# Patient Record
Sex: FEMALE | Race: WHITE | NOT HISPANIC OR LATINO | Employment: OTHER | URBAN - METROPOLITAN AREA
[De-identification: names, ages, dates, MRNs, and addresses within clinical notes are randomized per-mention and may not be internally consistent; named-entity substitution may affect disease eponyms.]

---

## 2021-07-15 ENCOUNTER — TELEPHONE (OUTPATIENT)
Dept: NEUROSURGERY | Facility: CLINIC | Age: 81
End: 2021-07-15

## 2021-07-15 NOTE — TELEPHONE ENCOUNTER
PT DAUGHTER CALLED TO CHECK STATUS OF REFERRAL, STATED THE REFERRING DR IS Sue Esparza FROM [facility], [phone] THEY ARE ONLY OPEN ON MON, WED AND Friday 8AM-5PM     UNABLE TO START REFERRAL OR LEAVE A MESSAGE DUE TO THEM BEING CLOSED TODAY.     PT DAUGHTER STATED PT HAD A CT SCAN AT [facility], CALLED [phone] [facility] TO HAVE CT FAXED TO THE HUB     THANK YOU

## 2021-10-11 ENCOUNTER — OFFICE VISIT (OUTPATIENT)
Dept: NEUROLOGY | Facility: CLINIC | Age: 81
End: 2021-10-11

## 2021-10-11 VITALS
RESPIRATION RATE: 16 BRPM | HEIGHT: 65 IN | SYSTOLIC BLOOD PRESSURE: 118 MMHG | HEART RATE: 87 BPM | OXYGEN SATURATION: 96 % | BODY MASS INDEX: 24.96 KG/M2 | DIASTOLIC BLOOD PRESSURE: 70 MMHG

## 2021-10-11 DIAGNOSIS — G91.2 NORMAL PRESSURE HYDROCEPHALUS (HCC): Primary | ICD-10-CM

## 2021-10-11 DIAGNOSIS — R26.89 ABNORMALITY OF GAIT DUE TO IMPAIRMENT OF BALANCE: ICD-10-CM

## 2021-10-11 DIAGNOSIS — R41.89 COGNITIVE IMPAIRMENT: ICD-10-CM

## 2021-10-11 DIAGNOSIS — R32 URINARY INCONTINENCE, UNSPECIFIED TYPE: ICD-10-CM

## 2021-10-11 PROCEDURE — 99204 OFFICE O/P NEW MOD 45 MIN: CPT | Performed by: PSYCHIATRY & NEUROLOGY

## 2021-10-11 RX ORDER — MEMANTINE HYDROCHLORIDE 10 MG/1
10 TABLET ORAL NIGHTLY
COMMUNITY
Start: 2021-10-08

## 2021-10-11 RX ORDER — SACCHAROMYCES BOULARDII 250 MG
CAPSULE ORAL
COMMUNITY
Start: 2021-09-29

## 2021-10-11 RX ORDER — AMOXICILLIN 500 MG/1
CAPSULE ORAL
COMMUNITY
Start: 2021-09-29 | End: 2022-12-20 | Stop reason: HOSPADM

## 2021-10-11 RX ORDER — LEVOTHYROXINE SODIUM 0.05 MG/1
50 TABLET ORAL DAILY
COMMUNITY
Start: 2021-09-13

## 2021-10-11 RX ORDER — MIRABEGRON 50 MG/1
50 TABLET, FILM COATED, EXTENDED RELEASE ORAL DAILY
COMMUNITY
Start: 2021-09-13 | End: 2022-12-20 | Stop reason: HOSPADM

## 2021-10-11 NOTE — H&P (VIEW-ONLY)
Chief Complaint   Patient presents with   • NORMAL PRESSURE HYDROCEPHALUS   • DILATED VENTRICLE       Patient ID: Velma Bangura is a 80 y.o. female.    HPI: I had the pleasure of seeing your patient today. As you may know she is an 80-year-old female with a history of short-term memory loss, urinary incontinence and gait disturbance. Her son who accompanies her today says that she has had increased cognitive issues over the past 4 years or so. She has had to make lists and has issues with repeating herself. The patient did repeat herself on a couple of occasions during our interview today. She has also had incontinence for the past 2 years. Her son says that she has complete incontinence. The gait and balance issues have been going on for the past couple of years as well. She has progressed to using a walker during long distance ambulation and wheelchair is as needed. Her last fall was potentially a week or so ago. She recently had a CT scan of her head Which showed significant volume loss involving the temporal lobes with enlargement of the parahippocampal fissures and the sylvian fissures. Her mother had a history of dementia.   The patient has a history of breast cancer however was treated with radiation and no chemotherapy. She denies any history of diabetes. No family history of peripheral neuropathies.    The following portions of the patient's history were reviewed and updated as appropriate: allergies, current medications, past family history, past medical history, past social history, past surgical history and problem list.    Review of Systems   Constitutional: Negative for activity change, appetite change and fatigue.   HENT: Negative for ear pain and trouble swallowing.    Eyes: Negative for photophobia, pain and visual disturbance.   Respiratory: Negative for cough, chest tightness and shortness of breath.    Gastrointestinal: Negative for nausea and vomiting.   Musculoskeletal: Positive for back pain and  gait problem. Negative for neck pain.   Allergic/Immunologic: Negative for environmental allergies, food allergies and immunocompromised state.   Neurological: Negative for dizziness, tremors, seizures, syncope, facial asymmetry, speech difficulty, weakness, light-headedness, numbness and headaches.   Psychiatric/Behavioral: Positive for confusion. Negative for agitation, behavioral problems, decreased concentration, dysphoric mood, hallucinations, self-injury, sleep disturbance and suicidal ideas. The patient is not nervous/anxious and is not hyperactive.       I have reviewed the review of systems above performed by my medical assistant.      Vitals:    10/11/21 1023   BP: 118/70   Pulse: 87   Resp: 16   SpO2: 96%       Neurologic Exam     Mental Status   Oriented to person, place, and time.   Registration: recalls 3 of 3 objects. Follows 3 step commands.   Attention: normal. Concentration: normal.   Speech: speech is normal   Level of consciousness: alert  Knowledge: consistent with education (No deficits found.).   Normal comprehension.     Cranial Nerves     CN II   Visual fields full to confrontation.     CN III, IV, VI   Pupils are equal, round, and reactive to light.  Extraocular motions are normal.   CN III: no CN III palsy  CN VI: no CN VI palsy  Nystagmus: none   Diplopia: none    CN V   Facial sensation intact.     CN VII   Facial expression full, symmetric.     CN VIII   CN VIII normal.     CN IX, X   CN IX normal.   CN X normal.     CN XI   CN XI normal.     CN XII   CN XII normal.     Motor Exam   Muscle bulk: normal  Right arm tone: normal  Left arm tone: normal  Right leg tone: normal  Left leg tone: normal    Strength   Right neck flexion: 5/5  Left neck flexion: 5/5  Right neck extension: 5/5  Left neck extension: 5/5  Right deltoid: 5/5  Left deltoid: 5/5  Right biceps: 5/5  Left biceps: 5/5  Right triceps: 5/5  Left triceps: 5/5  Right wrist flexion: 5/5  Left wrist flexion: 5/5  Right wrist  extension: 5/5  Left wrist extension: 5/5  Right interossei: 5/5  Left interossei: 5/5  Right abdominals: 5/5  Left abdominals: 5/5  Right iliopsoas: 5/5  Left iliopsoas: 5/5  Right quadriceps: 5/5  Left quadriceps: 5/5  Right hamstrin/5  Left hamstrin/5  Right glutei: 5/5  Left glutei: 5/5  Right anterior tibial: 5/5  Left anterior tibial: 5/5  Right posterior tibial: 5/5  Left posterior tibial: 5/5  Right peroneal: 5/5  Left peroneal: 5/5  Right gastroc: 5/5  Left gastroc: 55    Sensory Exam   Right leg light touch: decreased from toes  Left leg light touch: decreased from toes  Right leg vibration: decreased from ankle  Left leg vibration: decreased from ankle  Right leg proprioception: decreased from toes  Left leg proprioception: decreased from toes  Right leg pinprick: decreased from toes  Left leg pinprick: decreased from toes    Gait, Coordination, and Reflexes     Gait  Gait: wide-based    Coordination   Romberg: positive    Tremor   Resting tremor: absent  Intention tremor: absent    Reflexes   Right brachioradialis: 2+  Left brachioradialis: 2+  Right biceps: 2+  Left biceps: 2+  Right triceps: 2+  Left triceps: 2+  Right patellar: 1+  Left patellar: 1+  Right achilles: 1+  Left achilles: 0  Right : 2+  Left : 2+Station is normal.       Physical Exam  Vitals reviewed.   Constitutional:       General: She is not in acute distress.     Appearance: She is well-developed.   HENT:      Head: Normocephalic and atraumatic.   Eyes:      Extraocular Movements: EOM normal.      Pupils: Pupils are equal, round, and reactive to light.   Cardiovascular:      Rate and Rhythm: Normal rate and regular rhythm.      Heart sounds: Normal heart sounds.   Pulmonary:      Effort: Pulmonary effort is normal. No respiratory distress.      Breath sounds: Normal breath sounds.   Abdominal:      General: Bowel sounds are normal. There is no distension.      Palpations: Abdomen is soft.      Tenderness: There is no  abdominal tenderness.   Musculoskeletal:         General: No deformity.      Cervical back: Normal range of motion.   Skin:     General: Skin is warm.      Findings: No rash.   Neurological:      Mental Status: She is oriented to person, place, and time.      Coordination: Romberg Test abnormal.      Deep Tendon Reflexes:      Reflex Scores:       Tricep reflexes are 2+ on the right side and 2+ on the left side.       Bicep reflexes are 2+ on the right side and 2+ on the left side.       Brachioradialis reflexes are 2+ on the right side and 2+ on the left side.       Patellar reflexes are 1+ on the right side and 1+ on the left side.       Achilles reflexes are 1+ on the right side and 0 on the left side.  Psychiatric:         Speech: Speech normal.         Judgment: Judgment normal.         Procedures    Assessment/Plan: I had a lengthy discussion with her and her son about normal pressure hydrocephalus. We did discuss the potential benefit of moving forward with testing. We will go ahead and schedule a lumbar puncture. This is to be a high-volume spinal tap. If this does significantly improve her gait going well. However for now we will see her back after the lumbar puncture has been performed. May consider EMG/nerve conduction study as well.       Diagnoses and all orders for this visit:    1. Normal pressure hydrocephalus (HCC) (Primary)  -     IR Lumbar Puncture Diagnosis; Future    2. Abnormality of gait due to impairment of balance    3. Urinary incontinence, unspecified type    4. Cognitive impairment           Avelino Duenas II, MD

## 2021-10-11 NOTE — PROGRESS NOTES
10/20/21 0001   Pre-Procedure Phone Call   Procedure Time Verified Yes   Arrival Time 1300   Procedure Location Verified Yes   Medical History Reviewed No   NPO Status Reinforced Yes   Ride and Caregiver Arranged Yes   Patient Knows to Bring Current Medications No   Bring Outside Films Requested No

## 2021-10-18 ENCOUNTER — TELEPHONE (OUTPATIENT)
Dept: NEUROLOGY | Facility: CLINIC | Age: 81
End: 2021-10-18

## 2021-10-18 NOTE — TELEPHONE ENCOUNTER
CALLED DAUGHTER IN LAW. LVM. I EXPLAINED THERE ARE NO INSTRUCTIONS LIKE THAT ONES ASKED THAT I KNOW OF. HOWEVER I GAVE HER SCHEDULING PHONE NUMBER TO CALL AND ASK IF SHE THING IS NECESSARY OR NEEDED.

## 2021-10-18 NOTE — TELEPHONE ENCOUNTER
Caller: ANJELICA STRATTON    Relationship: DAUGHTER-IN-LAW    Best call back number: [phone]    What was the call regarding: PT'S DIL CALLED WITH GENERAL QUESTIONS REGARDING PT'S LP/ST SCHEDULED FOR Wednesday, 10/20/21. PT'S DIL WOULD LIKE TO KNOW IF THERE ARE ANY PREPARATORY STEPS TO TAKE PRIOR TO THE LP/ST. DOES PT NEED TO FAST? ANY SPECIFIC BATHING INSTRUCTIONS? PREFERRED CLOTHING TO WEAR TO THE PROCEDURE?    Do you require a callback: YES, PLEASE.    PLEASE REVIEW AND ADVISE.

## 2021-10-20 ENCOUNTER — TELEPHONE (OUTPATIENT)
Dept: NEUROLOGY | Facility: CLINIC | Age: 81
End: 2021-10-20

## 2021-10-20 ENCOUNTER — HOSPITAL ENCOUNTER (OUTPATIENT)
Dept: GENERAL RADIOLOGY | Facility: HOSPITAL | Age: 81
Discharge: HOME | End: 2021-10-20
Admitting: PSYCHIATRY & NEUROLOGY

## 2021-10-20 VITALS
DIASTOLIC BLOOD PRESSURE: 87 MMHG | SYSTOLIC BLOOD PRESSURE: 132 MMHG | HEIGHT: 65 IN | BODY MASS INDEX: 34.99 KG/M2 | HEART RATE: 89 BPM | WEIGHT: 210 LBS | OXYGEN SATURATION: 97 % | RESPIRATION RATE: 18 BRPM

## 2021-10-20 DIAGNOSIS — G91.2 NORMAL PRESSURE HYDROCEPHALUS (HCC): ICD-10-CM

## 2021-10-20 PROCEDURE — 25010000003 LIDOCAINE 1 % SOLUTION: Performed by: PSYCHIATRY & NEUROLOGY

## 2021-10-20 RX ORDER — LIDOCAINE HYDROCHLORIDE 10 MG/ML
10 INJECTION, SOLUTION INFILTRATION; PERINEURAL ONCE
Status: COMPLETED | OUTPATIENT
Start: 2021-10-20 | End: 2021-10-20

## 2021-10-20 RX ADMIN — LIDOCAINE HYDROCHLORIDE 3 ML: 10 INJECTION, SOLUTION INFILTRATION; PERINEURAL at 13:26

## 2021-10-20 NOTE — TELEPHONE ENCOUNTER
Caller: KAUSHAL DEJESUS       Best call back number: [phone]      What was the call regarding: [facility] NEED'S  TO KNOW IF THEY SHOULD BE WATCHING FOR ANYTHING AFTER S. PUNCT ?  SAID THERE WAS NO INSTRUCTION AND DIDN'T KNOW IF NEEDED A F.U APPT.  CALLED OFFICE DR GUSTAFSON OUT  OFFICE WAS ABLE TO GET A F.U SCHED FOR PT. IF THEY NEED FURTHER ADVISE PLEASE CALL .     Do you require a callback: ONLY IF NEEDED     PLEASE ADVISE

## 2021-10-20 NOTE — DISCHARGE INSTRUCTIONS
EDUCATION /DISCHARGE INSTRUCTION   A lumbar puncture involves insertion of a sterile needle into the spinal canal by the physician   This procedure is performed for several reasons: to detect increased intracranial pressure, the presence of blood in cerebrospinal fluid (CFS), to obtain CSF specimens for laboratory studies, to administer drugs and to relieve pressure by removing CSF.    You will lie on your stomach with a pillow under your stomach.  This opens the vertebral space to allow access to the spinal needle.  The physician will sterilize the area using antiseptic solution and numb the area where the spinal needle will be placed.  If CSF is being removed for specimen, you may be asked to push or beardown to assist with the flow of the CSF. When the procedure is complete, a band aid will be placed over the injection site and you will be taken to the recovery area.    POTENTIAL RISKS OF A LUMBAR PUNCTURE INCLUDE BUT ARE NOT LIMITED TO:  *  Headache    *  Swelling at puncture site  *  Bleeding into the spinal canal *  Temporary difficulty urinating  *  Leakage of CSF   *  Fever  *  Pain caused by nerve irritation    BENEFIT OF PROCEDURE:  This is the only way to obtain spinal fluid or relieve pressure due to increased CSF.  There is no alternative.  THIS EDUCATION INFORMATION WAS REVIEWED PRIOR TO PROCEDURE AND CONSENT. Patient initials__________________Time_________________    FOLLOWING A LUMBAR PUNCTURE:  *  Drink plenty of liquids -  Caffeine is recommended   *  Lie down flat for the next 8 hours.  If you get a headache, lie down flat for 24 hours, continue to force fluids and call your doctor if  the headache persists.  *  Go straight home.  DO NOT DRIVE    CALL YOUR DOCTOR IF YOU HAVE:  *  A severe headache that will not go away  *  Redness, swelling or drainage from the puncture site  *  Neck stiffness, numbness or weakness  *  Any new or severe symptoms    Following the procedure, you should continue to  take all of your medications as directed by your primary physician unless otherwise instructed.  There have been no changes to the medications you provided us (with the following exceptions if applicable):    Resume taking your blood thinner or Aspirin on_____________________    YOU ARE THE MOST IMPORTANT FACTOR IN YOUR RECOVERY.  IF YOU HAVE QUESTIONS OR CONCERNS PLEASE CALL THE RADIOLOGY NURSES AT [phone]

## 2021-10-20 NOTE — TELEPHONE ENCOUNTER
Spoke with patient's son and informed him that she needs to lay flat and make sure she is well hydrated- he states that they [facility] him the [facility] thing at the hosp[facility].

## 2021-10-20 NOTE — NURSING NOTE
Pt arrived to Radiology triage [place] [facility] for LP.   Pt wearing a mask as well as this RN for any bedside care.

## 2021-10-20 NOTE — NURSING NOTE
Patient taken out to patient discharge via wheelchair, son to drive her home. No issues or concerns.

## 2021-10-21 ENCOUNTER — TELEPHONE (OUTPATIENT)
Dept: INTERVENTIONAL RADIOLOGY/VASCULAR | Facility: HOSPITAL | Age: 81
End: 2021-10-21

## 2021-10-26 ENCOUNTER — OFFICE VISIT (OUTPATIENT)
Dept: NEUROLOGY | Facility: CLINIC | Age: 81
End: 2021-10-26

## 2021-10-26 VITALS
SYSTOLIC BLOOD PRESSURE: 126 MMHG | BODY MASS INDEX: 34.95 KG/M2 | HEART RATE: 95 BPM | DIASTOLIC BLOOD PRESSURE: 74 MMHG | OXYGEN SATURATION: 98 % | HEIGHT: 65 IN

## 2021-10-26 DIAGNOSIS — R41.89 COGNITIVE IMPAIRMENT: ICD-10-CM

## 2021-10-26 DIAGNOSIS — R26.89 SHUFFLING GAIT: ICD-10-CM

## 2021-10-26 DIAGNOSIS — G25.2 RESTING TREMOR: ICD-10-CM

## 2021-10-26 DIAGNOSIS — G60.9 PERIPHERAL NEUROPATHY, IDIOPATHIC: ICD-10-CM

## 2021-10-26 DIAGNOSIS — R26.89 ABNORMALITY OF GAIT DUE TO IMPAIRMENT OF BALANCE: Primary | ICD-10-CM

## 2021-10-26 PROCEDURE — 99215 OFFICE O/P EST HI 40 MIN: CPT | Performed by: PSYCHIATRY & NEUROLOGY

## 2021-10-26 NOTE — PROGRESS NOTES
"Chief Complaint   Patient presents with   • Normal Pressure Hydrocephalus       Patient ID: Velma Bangura is a 80 y.o. female.    HPI: I have had the pleasure of seeing your patient today.  As you may know she is an 80-year-old female whom we have seen previously for a history of possible normal pressure hydrocephalus.  We did send her for a high-volume spinal tap.  This did not help with respect to her gait, mentation or urinary incontinence.  She has been doing the same since last follow-up.  She still has very low confidence with respect to her gait.  She has not fallen recently.  She does use a wheelchair for long distances however a walker otherwise.  She denies any new onset focal weakness or numbness.  She still does have memory decline.  She is forgetful.  She has trouble with confusion occasionally.  Initiating tasks is somewhat difficult for her also.  Her daughter says that when she walks she tends to shuffle a lot.  They have also noted a resting tremor in her left upper extremity.  Patient says that its not a \"bother\" for her.  She denies any excessive pooling of saliva or drooling.    The following portions of the patient's history were reviewed and updated as appropriate: allergies, current medications, past family history, past medical history, past social history, past surgical history and problem list.    Review of Systems   Constitutional: Negative for activity change, appetite change and fatigue.   HENT: Negative for tinnitus, trouble swallowing and voice change.    Eyes: Negative for photophobia, pain and visual disturbance.   Respiratory: Negative for chest tightness, shortness of breath and wheezing.    Cardiovascular: Negative for chest pain, palpitations and leg swelling.   Gastrointestinal: Negative for abdominal pain, nausea and vomiting.   Endocrine: Negative for cold intolerance, heat intolerance and polydipsia.   Skin: Negative for color change, rash and wound.   Allergic/Immunologic: " Negative for environmental allergies, food allergies and immunocompromised state.   Neurological: Negative for dizziness, tremors, seizures, syncope, facial asymmetry, speech difficulty, weakness, light-headedness, numbness and headaches.   Hematological: Negative for adenopathy. Does not bruise/bleed easily.   Psychiatric/Behavioral: Negative for agitation, behavioral problems, confusion, decreased concentration, dysphoric mood, hallucinations, self-injury, sleep disturbance and suicidal ideas. The patient is not nervous/anxious and is not hyperactive.       I have reviewed the review of systems above performed by my medical assistant.      Vitals:    10/26/21 1409   BP: 126/74   Pulse: 95   SpO2: 98%       Neurologic Exam     Mental Status   Oriented to person, place, and time.   Concentration: normal.   Level of consciousness: alert  Knowledge: consistent with education (No deficits found.).     Cranial Nerves     CN II   Visual fields full to confrontation.     CN III, IV, VI   Pupils are equal, round, and reactive to light.  Extraocular motions are normal.   CN III: no CN III palsy  CN VI: no CN VI palsy    CN V   Facial sensation intact.     CN VII   Facial expression full, symmetric.     CN VIII   CN VIII normal.     CN IX, X   CN IX normal.   CN X normal.     CN XI   CN XI normal.     CN XII   CN XII normal.     Motor Exam     Strength   Right neck flexion: 5/5  Left neck flexion: 5/5  Right neck extension: 5/5  Left neck extension: 5/5  Right deltoid: 5/5  Left deltoid: 5/5  Right biceps: 5/5  Left biceps: 5/5  Right triceps: 5/5  Left triceps: 5/5  Right wrist flexion: 5/5  Left wrist flexion: 5/5  Right wrist extension: 5/5  Left wrist extension: 5/5  Right interossei: 5/5  Left interossei: 5/5  Right abdominals: 5/5  Left abdominals: 5/5  Right iliopsoas: 5/5  Left iliopsoas: 5/5  Right quadriceps: 5/5  Left quadriceps: 5/5  Right hamstrin/5  Left hamstrin/5  Right glutei: 5/5  Left glutei:  5/5  Right anterior tibial: 5/5  Left anterior tibial: 5/5  Right posterior tibial: 5/5  Left posterior tibial: 5/5  Right peroneal: 5/5  Left peroneal: 5/5  Right gastroc: 5/5  Left gastroc: 5/5    Sensory Exam   Light touch normal.   Right leg vibration: decreased from ankle  Left leg vibration: decreased from ankle    Gait, Coordination, and Reflexes     Gait  Gait: shuffling    Coordination   Romberg: positive    Tremor   Resting tremor: present  Intention tremor: present    Reflexes   Right brachioradialis: 2+  Left brachioradialis: 2+  Right biceps: 2+  Left biceps: 2+  Right triceps: 2+  Left triceps: 2+  Right patellar: 1+  Left patellar: 1+  Right achilles: 1+  Left achilles: 1+  Right : 2+  Left : 2+Station is normal.       Physical Exam  Vitals reviewed.   Constitutional:       Appearance: She is well-developed.   HENT:      Head: Normocephalic and atraumatic.   Eyes:      Extraocular Movements: EOM normal.      Pupils: Pupils are equal, round, and reactive to light.   Cardiovascular:      Rate and Rhythm: Normal rate and regular rhythm.   Pulmonary:      Breath sounds: Normal breath sounds.   Musculoskeletal:         General: Normal range of motion.   Skin:     General: Skin is warm.   Neurological:      Mental Status: She is oriented to person, place, and time.      Coordination: Romberg Test abnormal.      Deep Tendon Reflexes:      Reflex Scores:       Tricep reflexes are 2+ on the right side and 2+ on the left side.       Bicep reflexes are 2+ on the right side and 2+ on the left side.       Brachioradialis reflexes are 2+ on the right side and 2+ on the left side.       Patellar reflexes are 1+ on the right side and 1+ on the left side.       Achilles reflexes are 1+ on the right side and 1+ on the left side.        Procedures    Assessment/Plan: I would like to schedule her for physical therapy with balance and gait  therapy.  We will also schedule an EMG/nerve conduction study of both  lower extremities.  Neuropsych testing for her issues with cognitive decline.  She does have both intention tremor and resting tremor.  The resting tremor is in her left hand and very mild.  We will see her back after testing and may consider medication for Parkinson's disease.  At this point I do not feel that she has normal pressure hydrocephalus therefore we will hold off on neurosurgery referra now.l a total of 40 minutes was spent face-to-face with the patient today.  Of that greater than 50% of this time was spent discussing signs and symptoms of balance issues, cognitive impairment, resting tremor, patient education, plan of care and prognosis.       Diagnoses and all orders for this visit:    1. Abnormality of gait due to impairment of balance (Primary)  -     EMG & Nerve Conduction Test; Future  -     Ambulatory Referral to Physical Therapy    2. Cognitive impairment  -     Ambulatory Referral to Neuropsychology    3. Peripheral neuropathy, idiopathic  -     EMG & Nerve Conduction Test; Future  -     Ambulatory Referral to Physical Therapy    4. Resting tremor    5. Shuffling gait  -     Ambulatory Referral to Physical Therapy           Avelino Duenas II, MD

## 2021-11-15 ENCOUNTER — HOSPITAL ENCOUNTER (OUTPATIENT)
Dept: PHYSICAL THERAPY | Facility: HOSPITAL | Age: 81
Setting detail: THERAPIES SERIES
Discharge: HOME | End: 2021-11-15

## 2021-11-15 DIAGNOSIS — Z74.09 IMPAIRED MOBILITY: ICD-10-CM

## 2021-11-15 DIAGNOSIS — R26.89 IMPAIRED GAIT AND MOBILITY: Primary | ICD-10-CM

## 2021-11-15 PROCEDURE — 97530 THERAPEUTIC ACTIVITIES: CPT | Performed by: PHYSICAL THERAPIST

## 2021-11-15 PROCEDURE — 97162 PT EVAL MOD COMPLEX 30 MIN: CPT | Performed by: PHYSICAL THERAPIST

## 2021-11-15 NOTE — THERAPY EVALUATION
Outpatient Physical Therapy Ortho Initial Evaluation  [facility]     Patient Name: Velma Bangura  : 1940  MRN: [id number]  Today's Date: 11/15/2021      Visit Date: 11/15/2021    There is no problem list on file for this patient.       Past Medical History:   Diagnosis Date   • Arthritis         Past Surgical History:   Procedure Laterality Date   • BREAST LUMPECTOMY Left    • KNEE ARTHROPLASTY Left        Visit Dx:     ICD-10-CM ICD-9-CM   1. Impaired gait and mobility  R26.89 781.2   2. Impaired mobility  Z74.09 799.89          Patient History     Row Name 11/15/21 1000             History    Chief Complaint Balance Problems; Difficulty Walking; Difficulty with daily activities  -      Date Current Problem(s) Began --  3 years ago  -GJ      Brief Description of Current Complaint Ms. Bangura is an 79 y/o female. She has moved to [place] over the past year and resides at the [facility], in the assisted living side. Her son accompanies her today.  She demonstrates poor historical abilities and defers many questions to her son.  She doesn’t think she has problems with mobility. She has been on a walker for 2 years, and hasn’t had any falls from standing/waling position, but possibly while sitting on the commode x 2 this past year.  She does use  for long distance mobility.  Her gait balance as been declining over the past 3 years. She reports previously being very active in exercise and swimming.  She goes to visit her son that has 2 stairs to enter without railings, which the son reports has become increasingly problematic.  -GJ      Previous treatment for THIS PROBLEM Rehabilitation; Medication  CSF draw  -GJ      Patient/Caregiver Goals --  to not be handicapped  -GJ      Hand Dominance right-handed  -GJ      Occupation/sports/leisure activities drawing, making wreaths,  -GJ      Are you or can you be pregnant No  -GJ              Pain     Pain Location --  denies pain  -GJ              Fall Risk  Assessment    Any falls in the past year: Yes  -GJ      Number of falls reported in the last 12 months 2  -GJ      Factors that contributed to the fall: --  fall from cammode  -GJ      Does patient have a fear of falling No  -GJ              Daily Activities    Primary Language English  -GJ      Teaching needs identified Home Exercise Program; Management of Condition; Falls Prevention; Home Safety  -GJ      Patient is concerned about/has problems with Climbing Stairs; Performing home management (household chores, shopping, care of dependents); Performing job responsibilities/community activities (work, school,; Performing sports, recreation, and play activities; Transfers (getting out of a chair, bed); Walking  -GJ      Barriers to learning Cognitive  -GJ      Pt Participated in POC and Goals Yes  -GJ            User Key  (r) = Recorded By, (t) = Taken By, (c) = Cosigned By    Initials Name Provider Type    GJ Avelino Cruz, PT Physical Therapist                 PT Ortho     Row Name 11/15/21 1600       Posture/Observations    Alignment Options Forward head; Rounded shoulders  -GJ    Forward Head Mild; Moderate  -GJ    Rounded Shoulders Mild; Moderate  -GJ    Posture/Observations Comments pt stands in forward flexed posture, increased JOE  -GJ       Quarter Clearing    Quarter Clearing Lower Quarter Clearing  -GJ       DTR- Lower Quarter Clearing    Patellar tendon (L2-4) Bilateral:; 1- Minimal response  -GJ    Achilles tendon (S1-2) Bilateral:; 1- Minimal response  -GJ       Neural Tension Signs- Lower Quarter Clearing    Slump Bilateral:; Negative  -GJ    SLR Bilateral:; Negative  -GJ       Myotomal Screen- Lower Quarter Clearing    Hip flexion (L2) Bilateral:; 4+ (Good +)  -GJ    Knee extension (L3) Bilateral:; 5 (Normal)  -GJ    Ankle DF (L4) Bilateral:; 5 (Normal)  -GJ    Great toe extension (L5) Bilateral:; 5 (Normal)  -GJ    Ankle PF (S1) Bilateral:; 4 (Good)  -GJ    Knee flexion (S2) Bilateral:; 5 (Normal)   -GJ       General ROM    GENERAL ROM COMMENTS BLE PROM WFL  -GJ       MMT (Manual Muscle Testing)    Rt Lower Ext Rt Hip ABduction  -GJ    Lt Lower Ext Lt Hip ABduction  -GJ       MMT Right Lower Ext    Rt Hip ABduction MMT, Gross Movement (4-/5) good minus  pt had difficulty obtaining testing position  -GJ       MMT Left Lower Ext    Lt Hip ABduction MMT, Gross Movement (4-/5) good minus  pt had difficulty obtaining testing position  -GJ       Flexibility    Flexibility Tested? Lower Extremity  -GJ       Lower Extremity Flexibility    Hamstrings Bilateral:; Moderately limited  -GJ    Hip Flexors Bilateral:; Moderately limited  -GJ    Hip External Rotators Bilateral:; Moderately limited  -GJ    Hip Internal Rotators Bilateral:; Moderately limited  -GJ    Gastrocnemius Bilateral:; Moderately limited  -GJ       Balance Skills Training    SLS unable  -GJ    Rhomberg needs assist to obtain position, able to hold independently 20 seconds  -GJ    Sharpened Rhomberg unable  -GJ       Gait/Stairs (Locomotion)    Assistive Device (Gait) walker, front-wheeled  -GJ    Pattern (Gait) other (see comments)  shuffle step, near festinating  -GJ    Deviations/Abnormal Patterns (Gait) base of support, wide; bilateral deviations; festinating/shuffling  -GJ          User Key  (r) = Recorded By, (t) = Taken By, (c) = Cosigned By    Initials Name Provider Type    GJ Avelino Cruz, PT Physical Therapist                            Therapy Education  Education Details: discussed dx, px, poc, with pt and .  discussed safety/gait pattern, adjsted walker height. discussed realistic expectations and time frames for therapy. Discussed need to stay inside walker (vs pushing it too far in front of her) and encouraged her and son to work on mantra of heel first, heel first to help decrease shuffling/festinating pattern. Encouraged pt/familty to cue to stop if she starts to shuffle, allow her to reset, then return to initial contact being  heel first,  Given: Symptoms/condition management, Pain management, Posture/body mechanics, Mobility training, Fall prevention and home safety  How Provided: Verbal  Provided to: Patient, Caregiver  Level of Understanding: Teach back education performed, Verbalized, Demonstrated      PT OP Goals     Row Name 11/15/21 1300          PT Short Term Goals    STG Date to Achieve 12/17/21  -GJ     STG 1 pt. to be I with initial HEP to facilitate self management of their condition  -GJ     STG 1 Progress New  -GJ     STG 2 pt. to be educated in/verbalize understanding of the importance of posture/ergonomics in association with their condition to facilitate self management of their condition  -GJ     STG 2 Progress New  -GJ     STG 3 pt to demonstrate STS x 5 in </= 19 seconds with controlled eccentric phase to faciliate ease/safety at home  -GJ     STG 3 Progress New  -GJ            Long Term Goals    LTG Date to Achieve 02/11/22  -GJ     LTG 1 pt. to be I with advanced HEP to facilitate self management of their condition  -GJ     LTG 1 Progress New  -GJ     LTG 2 pt to demonstrate STS x 5 in </= 15 seconds with controlled eccentric phase to faciliate ease/safety at home  -GJ     LTG 2 Progress New  -GJ     LTG 3 pt to be able to ambulate >/= 280 feet during 2 tessa walk test with least restrictive device to facilitate ease/safety with household mobility  -GJ     LTG 3 Progress New  -GJ     LTG 4 pt to demonstrate near normal heel to toe gait pattern(absennce of festinating gait pattern)  with least restrictive AD, without cuing, to facilitate ease/safety with household/community mobility  -GJ     LTG 4 Progress New  -GJ     LTG 5 pt to be able to ascend/descend 2 steps with </= 1 rail to facilitate ease/safety with visiting son's house  -GJ     LTG 5 Progress New  -GJ     LTG 6 pt to demonstrate no hesitation in gait pattern while traversing transitions in floor surfaces to facilitatae ease/safetty with household/community  activities  -     LTG 6 Progress New  -            Time Calculation    PT Goal Re-Cert Due Date 02/11/22  -           User Key  (r) = Recorded By, (t) = Taken By, (c) = Cosigned By    Initials Name Provider Type    Avelino Mitchell, PT Physical Therapist                 PT Assessment/Plan     Row Name 11/15/21 1304          PT Assessment    Functional Limitations Decreased safety during functional activities; Impaired gait; Impaired locomotion; Limitation in home management; Limitations in community activities; Performance in leisure activities  -     Impairments Balance; Cognition; Endurance; Gait; Impaired flexibility; Impaired muscle endurance; Impaired muscle length; Impaired muscle power; Impaired postural alignment; Muscle strength; Poor body mechanics; Posture; Range of motion  -GJ     Assessment Comments Ms. Bangura is an 81 y/o female. She has moved to [place] over the past year and resides at the [facility], in the assisted living side. Her son accompanies her today.  She demonstrates poor historical abilities and defers many questions to her son.  She doesn’t think she has problems with mobility. She has been on a walker for 2 years, and hasn’t had any falls from standing/waling position, but possibly while sitting on the commode x 2 this past year.  She does use  for long distance mobility.  Her gait balance as been declining over the past 3 years. She reports previously being very active in exercise and swimming.  She goes to visit her son that has 2 stairs to enter without railings, which the son reports has become increasingly problematic.   Ms. Bangura presents today, using RW, pushing it far in front of her, demonstrating shuffle gait pattern lending toward festinating gait. She hesitates with transitions in lenin and with turning. She demonstrates forward flexed posture.  Generally speaking, lower quarter myotomes are equal and strong. She does demonstrate decreased hip abd MMT bilaterally. She  demonstrates deficits in sit to/from stand x 5 and 2 min. walk test. She responds to cues for heel first initial contact but requires near constant cuing.  Ms. Bangura demonstrates evolving s/s consistent with movement disorder (? Parkinson’s) and increased risks for falls which limits her safe participation in household, community, leisure activity.    Aggravating/Personal factors affecting recovery include,  but are not limited to, chronicity of condition, cognition, awareness of deficits.  Ms. Bangura may benefit from skilled physical therapy to address the above impairments  -     Please refer to paper survey for additional self-reported information Yes  -GJ     Rehab Potential Fair  -GJ     Patient/caregiver participated in establishment of treatment plan and goals Yes  -GJ     Patient would benefit from skilled therapy intervention Yes  -GJ            PT Plan    PT Frequency 1x/week; 2x/week  -GJ     Predicted Duration of Therapy Intervention (PT) 10 visits  -GJ     Planned CPT's? PT EVAL MOD COMPLELITY: 08745; PT RE-EVAL: 57884; PT THER PROC EA 15 MIN: 06389; PT THER ACT EA 15 MIN: 88078; PT MANUAL THERAPY EA 15 MIN: 71069; PT NEUROMUSC RE-EDUCATION EA 15 MIN: 76830; PT GAIT TRAINING EA 15 MIN: 20656  -     PT Plan Comments warm up on Nustep with UE's to promote pelvic/trunk disassociation, work on BIG priciple activities, increase step length, stay inside walker. (HR, mini squats, wash wall, STS, step taps??)  -GJ           User Key  (r) = Recorded By, (t) = Taken By, (c) = Cosigned By    Initials Name Provider Type    Avelino Mitchell, PT Physical Therapist                   OP Exercises     Row Name 11/15/21 1619             Total Minutes    75850 - PT Therapeutic Activity Minutes 15  education  -GJ            User Key  (r) = Recorded By, (t) = Taken By, (c) = Cosigned By    Initials Name Provider Type    Avelino Mitchell, PT Physical Therapist                              Outcome Measure Options: 2 Minute  Walk Test, 5x Sit to Stand  2 Minute Walk Test  Gait, Assistive Device: rolling walker  Distance Ambulated in 2 Minutes: 139  5 Times Sit to Stand  5 Times Sit to Stand (seconds): 22 seconds  5 Times Sit to Stand Comments: no eccentric control, abosolute need for UE's         Time Calculation:     Start Time: 1045  Stop Time: 1130  Time Calculation (min): 45 min  Timed Charges  60001 - PT Therapeutic Activity Minutes: 15 (education)  Total Minutes  Timed Charges Total Minutes: 15   Total Minutes: 15     Therapy Charges for Today     Code Description Service Date Service Provider Modifiers Qty    40415272104 HC PT THERAPEUTIC ACT EA 15 MIN 11/15/2021 Avelino Cruz, PT GP 1    68778314467 HC PT EVAL MOD COMPLEXITY 2 11/15/2021 Avelino Cruz, PT GP 1          PT G-Codes  Outcome Measure Options: 2 Minute Walk Test, 5x Sit to Stand         Avelino Cruz, PT  11/15/2021

## 2021-12-06 ENCOUNTER — HOSPITAL ENCOUNTER (OUTPATIENT)
Dept: PHYSICAL THERAPY | Facility: HOSPITAL | Age: 81
Setting detail: THERAPIES SERIES
Discharge: HOME | End: 2021-12-06

## 2021-12-06 DIAGNOSIS — R26.89 IMPAIRED GAIT AND MOBILITY: Primary | ICD-10-CM

## 2021-12-06 DIAGNOSIS — Z74.09 IMPAIRED MOBILITY: ICD-10-CM

## 2021-12-06 PROCEDURE — 97112 NEUROMUSCULAR REEDUCATION: CPT | Performed by: PHYSICAL THERAPIST

## 2021-12-06 PROCEDURE — 97110 THERAPEUTIC EXERCISES: CPT | Performed by: PHYSICAL THERAPIST

## 2021-12-06 NOTE — THERAPY TREATMENT NOTE
Outpatient Physical Therapy Ortho Treatment Note  [facility]     Patient Name: Velma Bangura  : 1940  MRN: [id number]  Today's Date: 2021      Visit Date: 2021    Visit Dx:    ICD-10-CM ICD-9-CM   1. Impaired gait and mobility  R26.89 781.2   2. Impaired mobility  Z74.09 799.89       There is no problem list on file for this patient.       Past Medical History:   Diagnosis Date   • Arthritis         Past Surgical History:   Procedure Laterality Date   • BREAST LUMPECTOMY Left    • KNEE ARTHROPLASTY Left                         PT Assessment/Plan     Row Name 21 1143          PT Assessment    Assessment Comments Ms. Bangura returns for her first follow up session of physical therapy for decreased balance/gait/LBP. Ms. Bangura is easily distracted and requires near constant cues to stay on task.  She will benefit from an environment with minimal stimuli.  Regular cues to stay inside walker (stop and reset during gait, followed by cues for heel first to facilitate improved step lengh). SHe tends toward festinating gait.  She reports LBP this session.  We initiated some standing BIG program activities to work on her gait pattern. SHe may benefit from pulling it back to seated BIG program initially. Ms. Bangura continues to be a good candidate for skilled physical therapy.  -            PT Plan    PT Plan Comments likely use room with minimal stimuli. Work on BIG program, maybe seated at first.  Continue to work on gait mechanics.  ? wash the wall to stretch anterior chest tissue  -           User Key  (r) = Recorded By, (t) = Taken By, (c) = Cosigned By    Initials Name Provider Type     Avelino Cruz, PT Physical Therapist                   OP Exercises     Row Name 21 1100 21 1052          Total Minutes    20320 - PT Therapeutic Exercise Minutes -- 12  -GJ     76376 -  PT Neuromuscular Reeducation Minutes -- 26  -GJ            Exercise 1    Exercise Name 1 nustep  - --      Cueing 1 Verbal  -GJ --     Reps 1 5  -GJ --     Time 1 UE/LE, L4, to encourage pelvic/trunk disassociation  -GJ --            Exercise 2    Exercise Name 2 HR  -GJ --     Cueing 2 Demo  -GJ --     Reps 2 15  -GJ --            Exercise 3    Exercise Name 3 LAQ  -GJ --     Cueing 3 Verbal; Demo  -GJ --     Reps 3 15, B  -GJ --     Additional Comments cues to get full ext, slow on way down  -GJ --            Exercise 4    Exercise Name 4 step and reach, B. BIG princile  -GJ --     Cueing 4 Verbal; Demo; Tactile; Auditory  -GJ --     Reps 4 10 each side  -GJ --     Additional Comments significant cuing for large step, to stay on task  -GJ --            Exercise 5    Exercise Name 5 lateral stepping  -GJ --     Cueing 5 Verbal; Demo  -GJ --     Reps 5 2 laps  -GJ --     Additional Comments copious cues to stand erect  -GJ --            Exercise 6    Exercise Name 6 forward/retro gait in parallel bars  -GJ --     Reps 6 4 lapsa  -GJ --     Additional Comments BUE, regular cues for increased step lent, attentiion to task, erect posture  -GJ --            Exercise 7    Exercise Name 7 LTR  -GJ --     Cueing 7 Verbal; Demo  -GJ --     Reps 7 10  -GJ --     Time 7 5s  -GJ --            Exercise 8    Exercise Name 8 SKTC  -GJ --     Cueing 8 Verbal; Demo  -GJ --     Reps 8 3  -GJ --     Time 8 15 s  -GJ --     Additional Comments therapist assisted  -GJ --            Exercise 9    Exercise Name 9 gait in clinic  -GJ --     Time 9 5 min, regular cues to stop, get inside walker, heel first cues.  Need to avoid converstion during gait,  -GJ --           User Key  (r) = Recorded By, (t) = Taken By, (c) = Cosigned By    Initials Name Provider Type    GJ Avelino Cruz W, PT Physical Therapist                              PT OP Goals     Row Name 12/06/21 1000          PT Short Term Goals    STG Date to Achieve 12/17/21  -GJ     STG 1 pt. to be I with initial HEP to facilitate self management of their condition  -GJ     STG 1  Progress Ongoing  -GJ     STG 1 Progress Comments initited  -GJ     STG 2 pt. to be educated in/verbalize understanding of the importance of posture/ergonomics in association with their condition to facilitate self management of their condition  -GJ     STG 2 Progress Ongoing  -GJ     STG 2 Progress Comments will require frequent reinforcement  -GJ     STG 3 pt to demonstrate STS x 5 in </= 19 seconds with controlled eccentric phase to faciliate ease/safety at home  -GJ     STG 3 Progress Ongoing  -GJ            Long Term Goals    LTG Date to Achieve 02/11/22  -GJ     LTG 1 pt. to be I with advanced HEP to facilitate self management of their condition  -GJ     LTG 1 Progress Ongoing  -GJ     LTG 2 pt to demonstrate STS x 5 in </= 15 seconds with controlled eccentric phase to faciliate ease/safety at home  -GJ     LTG 2 Progress Ongoing  -GJ     LTG 3 pt to be able to ambulate >/= 280 feet during 2 tessa walk test with least restrictive device to facilitate ease/safety with household mobility  -     LTG 3 Progress Ongoing  -GJ     LTG 4 pt to demonstrate near normal heel to toe gait pattern(absennce of festinating gait pattern)  with least restrictive AD, without cuing, to facilitate ease/safety with household/community mobility  -     LTG 4 Progress Ongoing  -GJ     LTG 5 pt to be able to ascend/descend 2 steps with </= 1 rail to facilitate ease/safety with visiting son's house  -     LTG 5 Progress Ongoing  -GJ     LTG 6 pt to demonstrate no hesitation in gait pattern while traversing transitions in floor surfaces to facilitatae ease/safetty with household/community activities  -     LTG 6 Progress Ongoing  -           User Key  (r) = Recorded By, (t) = Taken By, (c) = Cosigned By    Initials Name Provider Type    Avelino Mitchell, PT Physical Therapist                Therapy Education  Education Details: discussed with daughter in law condition, cues to use with gait, heel first. If getting outside of  walker, stop the pt. get her back inside walker, then initiate with heel first (large step)  Given: HEP, Symptoms/condition management, Posture/body mechanics, Pain management, Mobility training, Fall prevention and home safety  Program: New  How Provided: Verbal, Demonstration  Provided to: Patient, Caregiver (daughter in law)  Level of Understanding: Teach back education performed, Verbalized, Demonstrated              Time Calculation:   Start Time: 1052  Stop Time: 1130  Time Calculation (min): 38 min  Timed Charges  61182 - PT Therapeutic Exercise Minutes: 12  25571 -  PT Neuromuscular Reeducation Minutes: 26  Total Minutes  Timed Charges Total Minutes: 38   Total Minutes: 38  Therapy Charges for Today     Code Description Service Date Service Provider Modifiers Qty    30303892611  PT NEUROMUSC RE EDUCATION EA 15 MIN 12/6/2021 Avelino Cruz, PT GP 2    [id number]  PT THER PROC EA 15 MIN 12/6/2021 Avelino Cruz, PT GP 1                    Avelino Cruz, PT  12/6/2021

## 2021-12-09 ENCOUNTER — HOSPITAL ENCOUNTER (OUTPATIENT)
Dept: PHYSICAL THERAPY | Facility: HOSPITAL | Age: 81
Setting detail: THERAPIES SERIES
Discharge: HOME | End: 2021-12-09

## 2021-12-09 DIAGNOSIS — R26.89 IMPAIRED GAIT AND MOBILITY: Primary | ICD-10-CM

## 2021-12-09 DIAGNOSIS — Z74.09 IMPAIRED MOBILITY: ICD-10-CM

## 2021-12-09 PROCEDURE — 97112 NEUROMUSCULAR REEDUCATION: CPT

## 2021-12-09 PROCEDURE — 97110 THERAPEUTIC EXERCISES: CPT

## 2021-12-09 PROCEDURE — 97530 THERAPEUTIC ACTIVITIES: CPT

## 2021-12-09 NOTE — THERAPY TREATMENT NOTE
Outpatient Physical Therapy Ortho Treatment Note  [facility]     Patient Name: Velma Bangura  : 1940  MRN: [id number]  Today's Date: 2021      Visit Date: 2021    Visit Dx:    ICD-10-CM ICD-9-CM   1. Impaired gait and mobility  R26.89 781.2   2. Impaired mobility  Z74.09 799.89       There is no problem list on file for this patient.       Past Medical History:   Diagnosis Date   • Arthritis         Past Surgical History:   Procedure Laterality Date   • BREAST LUMPECTOMY Left    • KNEE ARTHROPLASTY Left                         PT Assessment/Plan     Row Name 21 1200          PT Assessment    Assessment Comments Vlema arrives to clinic ambulating with RWx demonstrating forward flexed posture, severe shuffling/festinating gait and head down position. She benefits from frequent cues to march knees to improve foot clearance. Therefore, walking marches added this date in attempt to receive carryover with gait mechanics. Also added supine BIG stomping marches to lateral glute bridge and BIG rotation/rolling to address bed mobility defects. Patient continues to require frequent redirection cues due to inattention after few repetitions. Patient experienced incontinent episode this date and require [place] to ambulation to restroom to improve ability to navigate RWx properly.  -DEMETRIUS            PT Plan    PT Plan Comments Continue to focus on bed mobility/sitting BIG program, address STS and transfers. Utilize standing interventions to address gait mechanics.  -DEMETRIUS           User Key  (r) = Recorded By, (t) = Taken By, (c) = Cosigned By    Initials Name Provider Type    Candida Vargas, PT Physical Therapist                   OP Exercises     Row Name 21 1100             Total Minutes    75954 - Gait Training Minutes  5  -DEMETRIUS      09966 - PT Therapeutic Exercise Minutes 10  -DEMETRIUS      27165 -  PT Neuromuscular Reeducation Minutes 10  -DEMETRIUS      60890 - PT Therapeutic Activity Minutes 15   -DEMETRIUS              Exercise 1    Exercise Name 1 nustep  -DEMETRIUS      Cueing 1 Verbal  -DEMETRIUS      Reps 1 5  -DEMETRIUS      Time 1 UE/LE, L4, to encourage pelvic/trunk disassociation  -DEMETRIUS              Exercise 2    Exercise Name 2 HR  -DEMETRIUS      Cueing 2 Demo  -DEMETRIUS      Reps 2 15  -DEMETRIUS              Exercise 3    Exercise Name 3 LAQ  -DEMETRIUS      Cueing 3 Verbal; Demo  -DEMETRIUS      Reps 3 15, B  -DEMETRIUS      Additional Comments cues to get full ext, slow on way down  -DEMETRIUS              Exercise 5    Exercise Name 5 lateral stepping  -DEMETRIUS      Cueing 5 Verbal; Demo  -DEMETRIUS      Reps 5 2 laps  -DEMETRIUS      Additional Comments copious cues to stand erect and large steps  -DEMETRIUS              Exercise 9    Exercise Name 9 gait in clinic  -DEMETRIUS      Time 9 5 min, regular cues to stop, get inside walker, heel first cues.  Need to avoid converstion during gait,  -DEMETRIUS      Additional Comments patient also benefits from cues to take large steps  -DEMETRIUS              Exercise 10    Exercise Name 10 walking marches  -DEMETRIUS      Cueing 10 Verbal; Demo  -DEMETRIUS      Reps 10 3 laps  -DEMETRIUS      Time 10 limited due to nausea  -DEMETRIUS      Additional Comments at elevated table  -DEMETRIUS              Exercise 11    Exercise Name 11 seated trunk rotation with clap  -DEMETRIUS      Cueing 11 Verbal; Demo  -DEMETRIUS      Sets 11 1  -DEMETRIUS      Reps 11 10e  -DEMETRIUS              Exercise 12    Exercise Name 12 HL LE stomp to bridge  -DEMETRIUS      Cueing 12 Verbal; Tactile; Demo  -DEMETRIUS      Reps 12 10e  -DEMETRIUS              Exercise 13    Exercise Name 13 HL to rolling + clap  -DEMETRIUS      Cueing 13 Verbal; Tactile; Demo  -DEMETRIUS      Sets 13 3  -DEMETRIUS      Reps 13 5e  -DEMETRIUS      Additional Comments 2 sets with tactile cues  -DEMETRIUS              Exercise 14    Exercise Name 14 STS + B shoulder flex  -DEMETRIUS            User Key  (r) = Recorded By, (t) = Taken By, (c) = Cosigned By    Initials Name Provider Type    Candida Vargas, PT Physical Therapist                              PT OP Goals     Row Name 12/09/21 1100          PT Short Term Goals     STG Date to Achieve 12/17/21  -     STG 1 pt. to be I with initial HEP to facilitate self management of their condition  -     STG 1 Progress Ongoing  [facility]     STG 2 pt. to be educated in/verbalize understanding of the importance of posture/ergonomics in association with their condition to facilitate self management of their condition  -     STG 2 Progress Ongoing  [facility]     STG 3 pt to demonstrate STS x 5 in </= 19 seconds with controlled eccentric phase to faciliate ease/safety at home  -     STG 3 Progress [facility]            Long Term Goals    LTG Date to Achieve 02/11/22  -     LTG 1 pt. to be I with advanced HEP to facilitate self management of their condition  -     LTG 1 Progress [facility]     LTG 2 pt to demonstrate STS x 5 in </= 15 seconds with controlled eccentric phase to faciliate ease/safety at home  -     LTG 2 Progress [facility]     LTG 3 pt to be able to ambulate >/= 280 feet during 2 tessa walk test with least restrictive device to facilitate ease/safety with household mobility  -     LTG 3 Progress [facility]     LTG 4 pt to demonstrate near normal heel to toe gait pattern(absennce of festinating gait pattern)  with least restrictive AD, without cuing, to facilitate ease/safety with household/community mobility  -     LT 4 Progress [facility]     LTG 5 pt to be able to ascend/descend 2 steps with </= 1 rail to facilitate ease/safety with visiting son's house  -[facility] 5 Progress [facility]     LTG 6 pt to demonstrate no hesitation in gait pattern while traversing transitions in floor surfaces to facilitatae ease/safetty with household/community activities  -     LT 6 Progress [facility]           User Key  (r) = Recorded By, (t) = Taken By, (c) = Cosigned By    Initials Name Provider Type    Candida Vargas, PT Physical Therapist                               Time Calculation:   Start Time: 1115  Stop Time: 1155  Time Calculation (min): 40  min  Timed Charges  38518 - PT Therapeutic Exercise Minutes: 10  97666 -  PT Neuromuscular Reeducation Minutes: 10  82740 - Gait Training Minutes : 5  01727 - PT Therapeutic Activity Minutes: 15  Total Minutes  Timed Charges Total Minutes: 40   Total Minutes: 40  Therapy Charges for Today     Code Description Service Date Service Provider Modifiers Qty    37425216571  PT NEUROMUSC RE EDUCATION EA 15 MIN 12/9/2021 Candida Low, PT GP 1    [id number]  PT THER PROC EA 15 MIN 12/9/2021 Candida Low, PT GP 1    [id number]  PT THERAPEUTIC ACT EA 15 MIN 12/9/2021 Candida Low, PT GP 1                    Candida Low, PT  12/9/2021

## 2021-12-13 ENCOUNTER — HOSPITAL ENCOUNTER (OUTPATIENT)
Dept: PHYSICAL THERAPY | Facility: HOSPITAL | Age: 81
Setting detail: THERAPIES SERIES
Discharge: HOME | End: 2021-12-13

## 2021-12-13 DIAGNOSIS — Z74.09 IMPAIRED MOBILITY: ICD-10-CM

## 2021-12-13 DIAGNOSIS — R26.89 IMPAIRED GAIT AND MOBILITY: Primary | ICD-10-CM

## 2021-12-13 PROCEDURE — 97110 THERAPEUTIC EXERCISES: CPT | Performed by: PHYSICAL THERAPIST

## 2021-12-13 NOTE — THERAPY TREATMENT NOTE
Outpatient Physical Therapy Ortho Treatment Note  [facility]     Patient Name: Velma Bangura  : 1940  MRN: [id number]  Today's Date: 2021      Visit Date: 2021    Visit Dx:    ICD-10-CM ICD-9-CM   1. Impaired gait and mobility  R26.89 781.2   2. Impaired mobility  Z74.09 799.89       There is no problem list on file for this patient.       Past Medical History:   Diagnosis Date   • Arthritis         Past Surgical History:   Procedure Laterality Date   • BREAST LUMPECTOMY Left    • KNEE ARTHROPLASTY Left                         PT Assessment/Plan     Row Name 21 1304          PT Assessment    Assessment Comments Ms. Bangura returns, denies LBP, howeve daughter-in-law reports pt was wincing this AM. We worked on bed mobility requiring near constant cuing verbally/tactile.  Worked on log roll technique, gait techniques with heel first initial contact.  Overall, pt did perform somewhat better today and able to focus on task slighly better.  She reports R shoulder pain with certain activities. I did raise her walker height with the idea of decreasing level of flexion/force used t hrough the walker. She may benefit from education that the walker is to not necessarily put all her weight through but to place hands on it and allow it to glide over the ground. Ms. Bangura continues to be a good candidate for skilled physical therapy.  -GJ            PT Plan    PT Plan Comments walker education (not to push through walker with such force but allow it to more smoothly glide). continue BIG treatment. gait, mobility  -GJ           User Key  (r) = Recorded By, (t) = Taken By, (c) = Cosigned By    Initials Name Provider Type    Avelino Mitchell, PT Physical Therapist                   OP Exercises     Row Name 21 1048 21 1000          Total Minutes    16327 - PT Therapeutic Exercise Minutes 40  -GJ --            Exercise 1    Exercise Name 1 -- nustep  -GJ     Cueing 1 -- Verbal  -GJ     Reps 1  -- 5  -GJ     Time 1 -- UE/LE, L4, to encourage pelvic/trunk disassociation  -GJ            Exercise 2    Exercise Name 2 -- HR  -GJ     Cueing 2 -- Demo  -GJ     Reps 2 -- 15  -GJ            Exercise 5    Exercise Name 5 -- lateral stepping  -GJ     Cueing 5 -- Verbal; Demo  -GJ     Reps 5 -- 2 laps  -GJ            Exercise 9    Exercise Name 9 -- gait in clinic  -GJ     Additional Comments -- 150 ft, RW, constant cuing for erect posture, heel first initial contact, look forward  -GJ            Exercise 10    Exercise Name 10 -- walking marches  -GJ     Cueing 10 -- Verbal; Demo  -GJ     Reps 10 -- 3 laps  -GJ     Additional Comments -- ballet bar, regular cues to slow down, hip flexion  -GJ            Exercise 11    Exercise Name 11 -- seated trunk rotation with clap  -GJ     Cueing 11 -- Verbal; Demo  -GJ     Reps 11 -- 10e  -GJ            Exercise 12    Exercise Name 12 -- HL LE stomp to bridge  -GJ     Cueing 12 -- Verbal; Tactile; Demo  -GJ     Reps 12 -- 10e  -GJ            Exercise 13    Exercise Name 13 -- HL to rolling + clap  -GJ     Cueing 13 -- Verbal; Tactile; Demo  -GJ     Sets 13 -- 3  -GJ     Reps 13 -- 5e  -GJ     Additional Comments -- more challenging going to L vs. R, requires regular cuing  -GJ            Exercise 14    Exercise Name 14 -- STS + B shoulder flex (first set)  -GJ     Cueing 14 -- Verbal; Demo  -GJ     Sets 14 -- 2  -GJ     Reps 14 -- 5  -GJ     Time 14 -- standard chair, first set with hands on arm rests,  -GJ            Exercise 15    Exercise Name 15 -- sit to L SL and back to sitting, cues for form/technique  -GJ     Cueing 15 -- Verbal; Demo; Tactile  -GJ     Reps 15 -- 5  -GJ            Exercise 16    Exercise Name 16 -- RW safety, adjusted height, safety with sitting from standing using walker (keep wallker with her back in to chair and sit with walker in front of her)  -GJ     Time 16 -- 5 min  -GJ           User Key  (r) = Recorded By, (t) = Taken By, (c) = Cosigned By     Initials Name Provider Type    Avelino Mitchell, PT Physical Therapist                              PT OP Goals     Row Name 12/13/21 1000          PT Short Term Goals    STG Date to Achieve 12/17/21  -GJ     STG 1 pt. to be I with initial HEP to facilitate self management of their condition  -GJ     STG 1 Progress Ongoing  -GJ     STG 2 pt. to be educated in/verbalize understanding of the importance of posture/ergonomics in association with their condition to facilitate self management of their condition  -GJ     STG 2 Progress Ongoing  -GJ     STG 3 pt to demonstrate STS x 5 in </= 19 seconds with controlled eccentric phase to faciliate ease/safety at home  -GJ     STG 3 Progress Ongoing  -GJ            Long Term Goals    LTG Date to Achieve 02/11/22  -GJ     LTG 1 pt. to be I with advanced HEP to facilitate self management of their condition  -GJ     LTG 1 Progress Ongoing  -GJ     LTG 2 pt to demonstrate STS x 5 in </= 15 seconds with controlled eccentric phase to faciliate ease/safety at home  -GJ     LTG 2 Progress Ongoing  -GJ     LTG 3 pt to be able to ambulate >/= 280 feet during 2 tessa walk test with least restrictive device to facilitate ease/safety with household mobility  -GJ     LTG 3 Progress Ongoing  -GJ     LTG 4 pt to demonstrate near normal heel to toe gait pattern(absennce of festinating gait pattern)  with least restrictive AD, without cuing, to facilitate ease/safety with household/community mobility  -GJ     LTG 4 Progress Ongoing  -GJ     LTG 5 pt to be able to ascend/descend 2 steps with </= 1 rail to facilitate ease/safety with visiting son's house  -     LTG 5 Progress Ongoing  -GJ     LTG 6 pt to demonstrate no hesitation in gait pattern while traversing transitions in floor surfaces to facilitatae ease/safetty with household/community activities  -     LTG 6 Progress Ongoing  [facility]           User Key  (r) = Recorded By, (t) = Taken By, (c) = Cosigned By    Initials Name Provider Type     GJ Avelino Cruz, PT Physical Therapist                Therapy Education  Education Details: walker cues, cues for gait, log rolling  Given: HEP, Symptoms/condition management, Pain management, Posture/body mechanics, Fall prevention and home safety, Mobility training  Program: Reinforced, Progressed, New  How Provided: Verbal, Demonstration  Provided to: Patient  Level of Understanding: Teach back education performed, Demonstrated, Verbalized              Time Calculation:   Start Time: 1048  Stop Time: 1130  Time Calculation (min): 42 min  Timed Charges  80302 - PT Therapeutic Exercise Minutes: 40  Total Minutes  Timed Charges Total Minutes: 40   Total Minutes: 40  Therapy Charges for Today     Code Description Service Date Service Provider Modifiers Qty    [id number] HC PT THER PROC EA 15 MIN 12/13/2021 Avelino Cruz, PT GP 3                    Avelino Cruz, PT  12/13/2021

## 2021-12-16 ENCOUNTER — HOSPITAL ENCOUNTER (OUTPATIENT)
Dept: PHYSICAL THERAPY | Facility: HOSPITAL | Age: 81
Setting detail: THERAPIES SERIES
Discharge: HOME | End: 2021-12-16

## 2021-12-16 DIAGNOSIS — Z74.09 IMPAIRED MOBILITY: ICD-10-CM

## 2021-12-16 DIAGNOSIS — R26.89 IMPAIRED GAIT AND MOBILITY: Primary | ICD-10-CM

## 2021-12-16 PROCEDURE — 97110 THERAPEUTIC EXERCISES: CPT

## 2021-12-16 PROCEDURE — 97530 THERAPEUTIC ACTIVITIES: CPT

## 2021-12-16 NOTE — THERAPY PROGRESS REPORT/RE-CERT
Outpatient Physical Therapy Ortho Progress Note  [facility]     Patient Name: Velma Bangura  : 1940  MRN: [id number]  Today's Date: 2021      Visit Date: 2021    Visit Dx:    ICD-10-CM ICD-9-CM   1. Impaired gait and mobility  R26.89 781.2   2. Impaired mobility  Z74.09 799.89       There is no problem list on file for this patient.       Past Medical History:   Diagnosis Date   • Arthritis         Past Surgical History:   Procedure Laterality Date   • BREAST LUMPECTOMY Left    • KNEE ARTHROPLASTY Left                         PT Assessment/Plan     Row Name 21 1100          PT Assessment    Functional Limitations Decreased safety during functional activities; Impaired gait; Impaired locomotion; Limitation in home management; Limitations in community activities; Performance in leisure activities  -     Impairments Balance; Cognition; Endurance; Gait; Impaired flexibility; Impaired muscle endurance; Impaired muscle length; Impaired muscle power; Impaired postural alignment; Muscle strength; Poor body mechanics; Posture; Range of motion  -DEMETRIUS     Assessment Comments Velma Bangura has been seen for 5 physical therapy sessions for balance/gait defects. Treatment has included therapeutic exercise, therapeutic activity, neuro-muscular retraining , gait training and patient education with home exercise program . Progress to physical therapy goals is slow due to limited sessions and progressive dementia/potential movement disorder hindering her success. Pt has met 0/3 STG and 0/6 LTG and slowly progressing towards all remaining goals. Patient demonstrates lack of progress with 2MWT from 139 feet on initial evaluation to 115 feet. She continues to require frequent/direct cues to reduce BLE shuffling, increase step length/height, to maintain RWx within close proximity, reduce head down posture and to focus on target ahead to improve attention. Patient's 5xSTS did not show improvement from 22  seconds on initial evaluation to 29 seconds with demonstration of minimal to no carry over from previous session to improve transfer mechanics. Previous sessions have targeted Ms. Bangura bed mobility, supine-sit, sit-stand and gait defects with utilizing part vs whole training due to cognitive impairment. Patient demonstrates limited carry over secondary to poor attention and increased cognitive load associated with outpatient environment regardless of reducing external stimulus while utilizing private treatment rooms. Ms. Bangura would benefit from continued skilled PT for 4 additional weeks. If no significant change noted, likely recommend neuro outpatient or home health PT services to target specific environmental defects.  -DEMETRIUS     Rehab Potential Fair  -DEMETRIUS     Patient/caregiver participated in establishment of treatment plan and goals Yes  -DEMETRIUS     Patient would benefit from skilled therapy intervention Yes  -DEMETRIUS            PT Plan    PT Frequency 2x/week  -DEMETRIUS     Predicted Duration of Therapy Intervention (PT) 8 visits  -DEMETRIUS     Planned CPT's? PT RE-EVAL: 84970; PT THER PROC EA 15 MIN: 82094; PT THER ACT EA 15 MIN: 78884; PT MANUAL THERAPY EA 15 MIN: 16935; PT NEUROMUSC RE-EDUCATION EA 15 MIN: 51040; PT GAIT TRAINING EA 15 MIN: 47604  -DEMETRIUS     PT Plan Comments Continue to utilize private room, target supine-sit, sit-stand and gait. Have patient use targets and teach back method to improve carryover. Attempt HHA vs RWx. Discuss compliance with HEP with family to receive more consistent carry over with functional tasks.  -DEMETRIUS           User Key  (r) = Recorded By, (t) = Taken By, (c) = Cosigned By    Initials Name Provider Type    Candida Vargas, PT Physical Therapist                   OP Exercises     Row Name 12/16/21 1100             Subjective Comments    Subjective Comments I do not feel like my walking or sit-stand has got any better. I am trying to take bigger steps  -DEMETRIUS              Total Minutes    25882  - PT Therapeutic Exercise Minutes 23  -DEMETRIUS      90365 - PT Therapeutic Activity Minutes 17  -DEMETRIUS              Exercise 1    Exercise Name 1 nustep  -DEMETRIUS      Cueing 1 Verbal  -DEMETRIUS      Reps 1 5  -DEMETRIUS      Time 1 UE/LE, L4, to encourage pelvic/trunk disassociation  -DEMETRIUS              Exercise 2    Exercise Name 2 HR  -DEMETRIUS      Cueing 2 Demo  -DEMETRIUS      Reps 2 15  -DEMETRIUS              Exercise 4    Exercise Name 4 Time spent completing 2MWT, 5xSTS, adjusting RWx, discussing POC  -DEMETRIUS      Time 4 20 mins  -DEMERTIUS              Exercise 5    Exercise Name 5 lateral stepping  -DEMETRIUS      Cueing 5 Verbal; Demo  -DEMETRIUS      Reps 5 3 laps  -DEMETRIUS      Additional Comments at elevated table  -DEMETRIUS              Exercise 7    Exercise Name 7 staggered stance alt BUE punches  -DEMETRIUS      Cueing 7 Verbal; Demo  -DEMETRIUS      Sets 7 1  -DEMETRIUS      Reps 7 10e  -DEMETRIUS      Additional Comments to PT hand for target  -DEMETRIUS              Exercise 9    Exercise Name 9 gait in clinic  -DEMETRIUS      Additional Comments 100 ft, RW, constant cuing for erect posture, heel first initial contact, look forward  -DEMETRIUS              Exercise 10    Exercise Name 10 walking marches  -DEMETRIUS      Cueing 10 Verbal; Demo  -DEMETRIUS      Reps 10 3 laps  -DEMETRIUS      Additional Comments at elevated table  -DEMETRIUS              Exercise 11    Exercise Name 11 seated trunk rotation with clap  -DEMETRIUS      Cueing 11 Verbal; Demo  -DEMETRIUS      Reps 11 10e  -DEMETRIUS              Exercise 14    Exercise Name 14 STS + B shoulder flex (first set)  -DEMETRIUS      Cueing 14 Verbal; Demo  -DEMETRIUS      Sets 14 2  -DEMETRIUS      Reps 14 5  -DEMETRIUS      Time 14 standard chair, first set with hands on arm rests,  -DEMETRIUS            User Key  (r) = Recorded By, (t) = Taken By, (c) = Cosigned By    Initials Name Provider Type    Candida Vargas, PT Physical Therapist                              PT OP Goals     Row Name 12/16/21 1100          PT Short Term Goals    STG Date to Achieve 12/17/21  -DEMETRIUS     STG 1 pt. to be I with initial HEP to facilitate self management of  their condition  -     STG 1 Progress Ongoing  -     STG 1 Progress Comments limited due to congnitive impairment and limited external assistance  -     STG 2 pt. to be educated in/verbalize understanding of the importance of posture/ergonomics in association with their condition to facilitate self management of their condition  -     STG 2 Progress Ongoing; Progressing  -     STG 3 pt to demonstrate STS x 5 in </= 19 seconds with controlled eccentric phase to faciliate ease/safety at home  -     STG 3 Progress Ongoing  -     STG 3 Progress Comments 29 sec  -            Long Term Goals    LTG Date to Achieve 02/11/22  -     LTG 1 pt. to be I with advanced HEP to facilitate self management of their condition  -     LTG 1 Progress Ongoing  -     LTG 2 pt to demonstrate STS x 5 in </= 15 seconds with controlled eccentric phase to faciliate ease/safety at home  -     LTG 2 Progress Ongoing  [facility]     LTG 3 pt to be able to ambulate >/= 280 feet during 2 tessa walk test with least restrictive device to facilitate ease/safety with household mobility  -     LTG 3 Progress Ongoing  -     LTG 3 Progress Comments 114 feet  -     LTG 4 pt to demonstrate near normal heel to toe gait pattern(absennce of festinating gait pattern)  with least restrictive AD, without cuing, to facilitate ease/safety with household/community mobility  -     LTG 4 Progress Ongoing  [facility]     LTG 5 pt to be able to ascend/descend 2 steps with </= 1 rail to facilitate ease/safety with visiting son's house  -     LT 5 Progress Ongoing  [facility]     LTG 6 pt to demonstrate no hesitation in gait pattern while traversing transitions in floor surfaces to facilitatae ease/safetty with household/community activities  -     LT 6 Progress [facility]           User Key  (r) = Recorded By, (t) = Taken By, (c) = Cosigned By    Initials Name Provider Type    Candida Vargas, PT Physical Therapist                     Outcome  Measure Options: 2 Minute Walk Test, 5x Sit to Stand  2 Minute Walk Test  Gait, Assistive Device: rolling walker  Distance Ambulated in 2 Minutes: 115  5 Times Sit to Stand  5 Times Sit to Stand (seconds): 29 seconds  5 Times Sit to Stand Comments: mild improvement in ecc control, continuous need for BUE use         Time Calculation:   Start Time: 1115  Stop Time: 1155  Time Calculation (min): 40 min  Timed Charges  84296 - PT Therapeutic Exercise Minutes: 23  15905 - PT Therapeutic Activity Minutes: 17  Total Minutes  Timed Charges Total Minutes: 40   Total Minutes: 40  Therapy Charges for Today     Code Description Service Date Service Provider Modifiers Qty    [id number]  PT THER PROC EA 15 MIN 12/16/2021 Candida Low, PT GP 2    [id number]  PT THERAPEUTIC ACT EA 15 MIN 12/16/2021 Candida Lwo, PT GP 1          PT G-Codes  Outcome Measure Options: 2 Minute Walk Test, 5x Sit to Stand         Candida Low, PT  12/16/2021

## 2021-12-20 ENCOUNTER — HOSPITAL ENCOUNTER (OUTPATIENT)
Dept: PHYSICAL THERAPY | Facility: HOSPITAL | Age: 81
Setting detail: THERAPIES SERIES
Discharge: HOME | End: 2021-12-20

## 2021-12-20 DIAGNOSIS — R26.89 IMPAIRED GAIT AND MOBILITY: Primary | ICD-10-CM

## 2021-12-20 DIAGNOSIS — Z74.09 IMPAIRED MOBILITY: ICD-10-CM

## 2021-12-20 PROCEDURE — 97110 THERAPEUTIC EXERCISES: CPT | Performed by: PHYSICAL THERAPIST

## 2021-12-20 NOTE — THERAPY TREATMENT NOTE
Outpatient Physical Therapy Ortho Treatment Note  [facility]     Patient Name: Velma Bangura  : 1940  MRN: [id number]  Today's Date: 2021      Visit Date: 2021    Visit Dx:    ICD-10-CM ICD-9-CM   1. Impaired gait and mobility  R26.89 781.2   2. Impaired mobility  Z74.09 799.89       There is no problem list on file for this patient.       Past Medical History:   Diagnosis Date   • Arthritis         Past Surgical History:   Procedure Laterality Date   • BREAST LUMPECTOMY Left    • KNEE ARTHROPLASTY Left                         PT Assessment/Plan     Row Name 21 1309          PT Assessment    Assessment Comments Ms. Bangura actually demonstrated more attentiveness during todays session, with fewer cues to stay on task.  Her performance was somewhat better as well, with noted decline in performance/attention as she fatigued.  She was able to ambulate further prior to starting her festination pattern.  We worked on exaggerated marching at the bars with cues to land on heel and performed well with minimal cues.  We worked on putting less pressure through her UE's into the walker to allow it to slide easier and hopefully with less stress to back.  Talked with pt/daughter in law about wearing shoes with backs which may help her gait pattern a little. Also discussed possible transition to our outpateint neuro department.  Ms. Bangura continues to be a good candidate for skilled physical therapy.  -HARRIS            PT Plan    PT Plan Comments continue to work on BIG priciples, gait, sit to/from stand, sit to/from supine transitions.  LIkely keep environment with minimal distractions.  -HARRIS           User Key  (r) = Recorded By, (t) = Taken By, (c) = Cosigned By    Initials Name Provider Type    Avelino Mitchell, PT Physical Therapist                   OP Exercises     Row Name 21 1053 21 1000          Subjective Comments    Subjective Comments -- I'm ok, back is ok  -HARRIS            Total  Minutes    12863 - PT Therapeutic Exercise Minutes 45  -GJ --            Exercise 1    Exercise Name 1 -- nustep  -GJ     Cueing 1 -- Verbal  -GJ     Reps 1 -- 5  -GJ     Time 1 -- UE/LE, L4, to encourage pelvic/trunk disassociation  -GJ            Exercise 2    Exercise Name 2 -- HR  -GJ     Reps 2 -- 15  -GJ     Additional Comments -- much improved clearence of heels from ground today  -GJ            Exercise 3    Exercise Name 3 -- gait with HHA (1) and gait belt  -GJ     Reps 3 -- 20 ft x 5  -GJ     Time 3 -- cues for large steps  -GJ            Exercise 5    Exercise Name 5 -- lateral stepping  -GJ     Cueing 5 -- Verbal; Demo  -GJ     Reps 5 -- 3 laps  -GJ     Additional Comments -- cues to look up  -GJ            Exercise 7    Exercise Name 7 -- staggered stance alt BUE punches  -GJ     Cueing 7 -- Verbal; Demo  -GJ     Reps 7 -- 10e  -GJ     Additional Comments -- to PT hand for target  -GJ            Exercise 9    Exercise Name 9 -- gait in clinic  -GJ     Additional Comments -- RW, down back hallway, cues to look up, heel first  -GJ            Exercise 10    Exercise Name 10 -- walking marches  -GJ     Cueing 10 -- Verbal; Demo  -GJ     Reps 10 -- 4 laps, parallel bars  -GJ     Time 10 -- cues for high knee, land on heel  -GJ     Additional Comments -- 2 laps with BUE, 2 laps with 1 hand  -GJ            Exercise 14    Exercise Name 14 -- STS  -GJ     Sets 14 -- 2  -GJ     Reps 14 -- 5  -GJ     Time 14 -- standard chair, attempted BUE flexion at terminal stance (does have R shoulder pain though)  -GJ            Exercise 17    Exercise Name 17 -- touch/tap 4 inch step, alteranting, 1 UE  -GJ     Cueing 17 -- Verbal; Demo  -GJ     Reps 17 -- 15 alternating  -GJ           User Key  (r) = Recorded By, (t) = Taken By, (c) = Cosigned By    Initials Name Provider Type    GJ Avelino Cruz, PT Physical Therapist                              PT OP Goals     Row Name 12/20/21 1000          PT Short Term Goals    STG  Date to Achieve 12/17/21  -GJ     STG 1 pt. to be I with initial HEP to facilitate self management of their condition  -GJ     STG 1 Progress Ongoing  -GJ     STG 2 pt. to be educated in/verbalize understanding of the importance of posture/ergonomics in association with their condition to facilitate self management of their condition  -GJ     STG 2 Progress Ongoing; Progressing  -GJ     STG 3 pt to demonstrate STS x 5 in </= 19 seconds with controlled eccentric phase to faciliate ease/safety at home  -GJ     STG 3 Progress Ongoing  -GJ            Long Term Goals    LTG Date to Achieve 02/11/22  -GJ     LTG 1 pt. to be I with advanced HEP to facilitate self management of their condition  -GJ     LTG 1 Progress Ongoing  -GJ     LTG 2 pt to demonstrate STS x 5 in </= 15 seconds with controlled eccentric phase to faciliate ease/safety at home  -GJ     LTG 2 Progress Ongoing  -GJ     LTG 3 pt to be able to ambulate >/= 280 feet during 2 tessa walk test with least restrictive device to facilitate ease/safety with household mobility  -GJ     LTG 3 Progress Ongoing  -GJ     LTG 4 pt to demonstrate near normal heel to toe gait pattern(absennce of festinating gait pattern)  with least restrictive AD, without cuing, to facilitate ease/safety with household/community mobility  -GJ     LTG 4 Progress Ongoing  -GJ     LTG 5 pt to be able to ascend/descend 2 steps with </= 1 rail to facilitate ease/safety with visiting son's house  -GJ     LTG 5 Progress Ongoing  -GJ     LTG 6 pt to demonstrate no hesitation in gait pattern while traversing transitions in floor surfaces to facilitatae ease/safetty with household/community activities  -GJ     LTG 6 Progress Ongoing  -           User Key  (r) = Recorded By, (t) = Taken By, (c) = Cosigned By    Initials Name Provider Type    Avelino Mitchell, PT Physical Therapist                Therapy Education  Education Details: discussed possibly wearing shoes with backs on them, gait  cues  Given: HEP, Symptoms/condition management, Pain management, Mobility training, Fall prevention and home safety  Program: Reinforced, Progressed, New  How Provided: Verbal, Demonstration  Provided to: Patient, Caregiver  Level of Understanding: Teach back education performed, Verbalized, Demonstrated              Time Calculation:   Start Time: 1045  Stop Time: 1130  Time Calculation (min): 45 min  Timed Charges  64931 - PT Therapeutic Exercise Minutes: 45  Total Minutes  Timed Charges Total Minutes: 45   Total Minutes: 45  Therapy Charges for Today     Code Description Service Date Service Provider Modifiers Qty    [id number] HC PT THER PROC EA 15 MIN 12/20/2021 Avelino Cruz, PT GP 3                    Avelino Cruz, PT  12/20/2021

## 2021-12-23 ENCOUNTER — HOSPITAL ENCOUNTER (OUTPATIENT)
Dept: PHYSICAL THERAPY | Facility: HOSPITAL | Age: 81
Setting detail: THERAPIES SERIES
Discharge: HOME | End: 2021-12-23

## 2021-12-23 DIAGNOSIS — R26.89 IMPAIRED GAIT AND MOBILITY: Primary | ICD-10-CM

## 2021-12-23 DIAGNOSIS — Z74.09 IMPAIRED MOBILITY: ICD-10-CM

## 2021-12-23 PROCEDURE — 97110 THERAPEUTIC EXERCISES: CPT

## 2021-12-23 NOTE — THERAPY TREATMENT NOTE
Outpatient Physical Therapy Ortho Treatment Note  [facility]     Patient Name: Velma Bangura  : 1940  MRN: [id number]  Today's Date: 2021      Visit Date: 2021    Visit Dx:    ICD-10-CM ICD-9-CM   1. Impaired gait and mobility  R26.89 781.2   2. Impaired mobility  Z74.09 799.89       There is no problem list on file for this patient.       Past Medical History:   Diagnosis Date   • Arthritis         Past Surgical History:   Procedure Laterality Date   • BREAST LUMPECTOMY Left    • KNEE ARTHROPLASTY Left                         PT Assessment/Plan     Row Name 21 1300          PT Assessment    Assessment Comments Velma displays mild carryover with stepping strategy due to demonstrating ability to maintain proper step length/height for 5-6 steps in comparison to 2-3 steps following verbal cues. Patient benefits from utilizing mirror for external visual feedback to improve quality of movement and maintain attention to task. Also discussed with patient/family to utilize specific target when attempting to ambulate with increased external stimlulus to increase ability to maintain attention to gait mechanics. Added forward step onto foam with forward reach and elevated STS without BUE assistance with good tolerance. Velma remains a candidate for skilled PT.  -DEMETRIUS            PT Plan    PT Plan Comments continue to work on BIG priciples, gait, sit to/from stand, sit to/from supine transitions.  LIkely keep environment with minimal distractions. Continue with mirror for visual feedback. Work on transfers?  -DEMETRIUS           User Key  (r) = Recorded By, (t) = Taken By, (c) = Cosigned By    Initials Name Provider Type    Candida Vargas, PT Physical Therapist                   OP Exercises     Row Name 21 1300             Subjective Comments    Subjective Comments Im doing okay  -DEMETRIUS              Total Minutes    28719 - PT Therapeutic Exercise Minutes 40  -DEMETRIUS              Exercise 1     Exercise Name 1 nustep  -DEMETRIUS      Cueing 1 Verbal  -DEMETRIUS      Reps 1 5  -DEMETRIUS      Time 1 UE/LE, L4, to encourage pelvic/trunk disassociation  -DEMETRIUS              Exercise 2    Exercise Name 2 HR  -DEMETRIUS      Reps 2 15  -DEMETRIUS              Exercise 5    Exercise Name 5 lateral stepping  -DEMETRIUS      Cueing 5 Verbal; Demo  -DEMETRIUS      Reps 5 3 laps  -DEMETRIUS      Additional Comments cues to look up  -DEMETRIUS              Exercise 7    Exercise Name 7 staggered stance alt BUE punches  -DEMETRIUS      Cueing 7 Verbal; Demo  -DEMETRIUS      Reps 7 10e  -DEMETRIUS      Additional Comments to PT hand for target  -DEMETRIUS              Exercise 9    Exercise Name 9 gait in clinic  -DEMETRIUS      Additional Comments RW, down back hallway, cues to look up, heel first  -DEMETRIUS              Exercise 10    Exercise Name 10 walking marches  -DEMETRIUS      Cueing 10 Verbal; Demo  -DEMETRIUS      Reps 10 4 laps, parallel bars  -DEMETRIUS      Time 10 cues for high knee, land on heel  -DEMETRIUS      Additional Comments using mirror  -DEMETRIUS              Exercise 14    Exercise Name 14 STS without UE assist  -DEMETRIUS      Sets 14 2  -DEMETRIUS      Reps 14 5  -DEMETRIUS      Time 14 standard chair with foam, attempted BUE flexion at terminal stance (does have R shoulder pain though)  -DEMETRIUS      Additional Comments using mirror and pink ball for forward reach  -DEMETRIUS              Exercise 16    Exercise Name 16 Forward step to foam with UE forward reach  -DEMETRIUS      Cueing 16 Verbal; Demo  -DEMETRIUS      Sets 16 1  -DEMETRIUS      Reps 16 10e  -DEMETRIUS      Time 16 looking at mirror  -DEMETRIUS      Additional Comments in // bars with CGA  -DEMETRIUS              Exercise 17    Exercise Name 17 touch/tap 4 inch step, alteranting, 1 UE  -DEMETRIUS      Cueing 17 Verbal; Demo  -DEMETRIUS      Reps 17 15 alternating  -DEMETRIUS            User Key  (r) = Recorded By, (t) = Taken By, (c) = Cosigned By    Initials Name Provider Type    Candida Vargas, PT Physical Therapist                              PT OP Goals     Row Name 12/23/21 1100          PT Short Term Goals    STG Date to Achieve  12/17/21  -     STG 1 pt. to be I with initial HEP to facilitate self management of their condition  -     STG 1 Progress Ongoing  -     STG 2 pt. to be educated in/verbalize understanding of the importance of posture/ergonomics in association with their condition to facilitate self management of their condition  -     STG 2 Progress Ongoing; Progressing  -     STG 3 pt to demonstrate STS x 5 in </= 19 seconds with controlled eccentric phase to faciliate ease/safety at home  -     STG 3 Progress Ongoing  -            Long Term Goals    LTG Date to Achieve 02/11/22  -     LTG 1 pt. to be I with advanced HEP to facilitate self management of their condition  -     LTG 1 Progress Ongoing  -     LTG 2 pt to demonstrate STS x 5 in </= 15 seconds with controlled eccentric phase to faciliate ease/safety at home  -     LTG 2 Progress [facility]     LTG 3 pt to be able to ambulate >/= 280 feet during 2 tessa walk test with least restrictive device to facilitate ease/safety with household mobility  -     LTG 3 Progress Ongoing  -     LTG 4 pt to demonstrate near normal heel to toe gait pattern(absennce of festinating gait pattern)  with least restrictive AD, without cuing, to facilitate ease/safety with household/community mobility  -     LTG 4 Progress [facility]     LTG 5 pt to be able to ascend/descend 2 steps with </= 1 rail to facilitate ease/safety with visiting son's house  -     LT 5 Progress [facility]     LTG 6 pt to demonstrate no hesitation in gait pattern while traversing transitions in floor surfaces to facilitatae ease/safetty with household/community activities  -     LT 6 Progress [facility]           User Key  (r) = Recorded By, (t) = Taken By, (c) = Cosigned By    Initials Name Provider Type    Candida Vargas, PT Physical Therapist                               Time Calculation:   Start Time: 1115  Stop Time: 1155  Time Calculation (min): 40 min  Timed  Charges  48693 - PT Therapeutic Exercise Minutes: 40  Total Minutes  Timed Charges Total Minutes: 40   Total Minutes: 40  Therapy Charges for Today     Code Description Service Date Service Provider Modifiers Qty    [id number]  PT THER PROC EA 15 MIN 12/23/2021 Candida Low, PT GP 3                    Candida Low, PT  12/23/2021

## 2021-12-27 ENCOUNTER — HOSPITAL ENCOUNTER (OUTPATIENT)
Dept: PHYSICAL THERAPY | Facility: HOSPITAL | Age: 81
Setting detail: THERAPIES SERIES
Discharge: HOME | End: 2021-12-27

## 2021-12-27 DIAGNOSIS — Z74.09 IMPAIRED MOBILITY: ICD-10-CM

## 2021-12-27 DIAGNOSIS — R26.89 IMPAIRED GAIT AND MOBILITY: Primary | ICD-10-CM

## 2021-12-27 PROCEDURE — 97110 THERAPEUTIC EXERCISES: CPT

## 2021-12-27 NOTE — THERAPY TREATMENT NOTE
Outpatient Physical Therapy Ortho Treatment Note  [facility]     Patient Name: Velma Bangura  : 1940  MRN: [id number]  Today's Date: 2021      Visit Date: 2021    Visit Dx:    ICD-10-CM ICD-9-CM   1. Impaired gait and mobility  R26.89 781.2   2. Impaired mobility  Z74.09 799.89       There is no problem list on file for this patient.       Past Medical History:   Diagnosis Date   • Arthritis         Past Surgical History:   Procedure Laterality Date   • BREAST LUMPECTOMY Left    • KNEE ARTHROPLASTY Left                         PT Assessment/Plan     Row Name 21 1200          PT Assessment    Assessment Comments Velma returns to PT demonstrating mild carry over with gait mechanics due to taking approximately 7-8 steps before shuffling feet and requiring less frequent cues. Significant improvement in step length/frequency following verbal cues to take specific amount of steps to reach desired destination. Patient also improved attention to task during standing interventions by counting number of repetitions/steps completed. Mirror feedback continues to benefit Velma's ability to performing interventions more consistently. Targeted stand-pivot (chair-table) transfers by demonstrating proper use of RWx to improve safety and reduce step frequency. She continues to demonstrate improvement in functional tasks and remains a candidate for skilled PT.  -DEMETRIUS            PT Plan    PT Plan Comments Continue with counting method, consider standing on foam and bending/reaching activities  -DEMETRIUS           User Key  (r) = Recorded By, (t) = Taken By, (c) = Cosigned By    Initials Name Provider Type    Canidda Vargas, PT Physical Therapist                   OP Exercises     Row Name 21 1100             Subjective Comments    Subjective Comments Things are going okay. I got shoes with back. Daughter-in-law states consistent carry over with gait mechanics leading to reduction in shuffle  and still having trouble with transfers  -DEMETRIUS              Total Minutes    35278 - PT Therapeutic Exercise Minutes 45  -DEMETRIUS              Exercise 1    Exercise Name 1 nustep  -DEMETRIUS      Cueing 1 Verbal  -DEMETRIUS      Reps 1 5  -DEMETRIUS      Time 1 UE/LE, L4, to encourage pelvic/trunk disassociation  -DEMETRIUS              Exercise 2    Exercise Name 2 HR  -DEMETRIUS      Reps 2 15  -DEMETRIUS              Exercise 5    Exercise Name 5 lateral stepping  -DEMETRIUS      Cueing 5 Verbal; Demo  -DEMETRIUS      Reps 5 3 laps  -DEMETRIUS      Time 5 take 6 steps  -DEMETRIUS      Additional Comments cues to look up  -DEMETRIUS              Exercise 7    Exercise Name 7 staggered stance alt BUE punches  -DEMETRIUS      Time 7 not completed due to R shoulder pain  -DEMETRIUS              Exercise 9    Exercise Name 9 gait in clinic  -DEMETRIUS      Additional Comments RW, down back hallway, cues to look up, heel first  -DEMETRIUS              Exercise 10    Exercise Name 10 walking marches  -DEMETRIUS      Cueing 10 Verbal; Demo  -DEMETRIUS      Reps 10 6 steps, 4 laps, parallel bars  -DEMETRIUS      Time 10 cues for high knee, land on heel  -DEMETRIUS      Additional Comments using mirror  -DEMETRIUS              Exercise 14    Exercise Name 14 STS without UE assist  -DEMETRIUS      Sets 14 2  -DEMETRIUS      Reps 14 10  -DEMETRIUS      Time 14 standard chair with foam, attempted BUE flexion at terminal stance (does have R shoulder pain though)  -DEMETRIUS      Additional Comments pink ball  -DEMETRIUS              Exercise 15    Exercise Name 15 chair-table transfer  -DEMETRIUS      Cueing 15 Verbal; Demo  -DEMETRIUS      Sets 15 1  -DEMETRIUS      Reps 15 5  -DEMETRIUS      Time 15 cues to take 3 steps  -DEMETRIUS      Additional Comments BUE assistance, use of RWx  -DEMETRIUS              Exercise 16    Exercise Name 16 Forward/lat step to foam with UE forward reach  -DEMETRIUS      Cueing 16 Verbal; Demo  -DEMETRIUS      Sets 16 1  -DEMETRIUS      Reps 16 10e  -DEMETRIUS      Time 16 looking at mirror  -DEMETRIUS      Additional Comments in // bars with CGA  -DEMETRIUS              Exercise 17    Exercise Name 17 touch/tap 8 inch step, alteranting, 1 UE  -DEMETRIUS       Cueing 17 Verbal; Demo  -      Reps 17 15 alternating  -      Additional Comments elevated step to increased march  -            User Key  (r) = Recorded By, (t) = Taken By, (c) = Cosigned By    Initials Name Provider Type    Candida Vargas, PT Physical Therapist                              PT OP Goals     Row Name 12/27/21 1000          PT Short Term Goals    STG Date to Achieve 12/17/21  -     STG 1 pt. to be I with initial HEP to facilitate self management of their condition  -     STG 1 Progress Ongoing  [facility]     STG 2 pt. to be educated in/verbalize understanding of the importance of posture/ergonomics in association with their condition to facilitate self management of their condition  -     STG 2 Progress Ongoing; Progressing  -     STG 3 pt to demonstrate STS x 5 in </= 19 seconds with controlled eccentric phase to faciliate ease/safety at home  -     STG 3 Progress [facility]            Long Term Goals    LTG Date to Achieve 02/11/22  -     LTG 1 pt. to be I with advanced HEP to facilitate self management of their condition  -     LTG 1 Progress Ongoing  [facility]     LTG 2 pt to demonstrate STS x 5 in </= 15 seconds with controlled eccentric phase to faciliate ease/safety at home  -     LTG 2 Progress [facility]     LTG 3 pt to be able to ambulate >/= 280 feet during 2 tessa walk test with least restrictive device to facilitate ease/safety with household mobility  -     LTG 3 Progress [facility]     LTG 4 pt to demonstrate near normal heel to toe gait pattern(absennce of festinating gait pattern)  with least restrictive AD, without cuing, to facilitate ease/safety with household/community mobility  -     LTG 4 Progress [facility]     LTG 5 pt to be able to ascend/descend 2 steps with </= 1 rail to facilitate ease/safety with visiting son's house  -     LT 5 Progress [facility]     LTG 6 pt to demonstrate no hesitation in gait pattern while traversing transitions  in floor surfaces to facilitatae ease/safetty with household/community activities  -DEMETRIUS     LTG 6 Progress Ongoing  -DEMETRIUS           User Key  (r) = Recorded By, (t) = Taken By, (c) = Cosigned By    Initials Name Provider Type    Candida Vargas, PT Physical Therapist                               Time Calculation:   Start Time: 1047  Stop Time: 1132  Time Calculation (min): 45 min  Timed Charges  90755 - PT Therapeutic Exercise Minutes: 45  Total Minutes  Timed Charges Total Minutes: 45   Total Minutes: 45  Therapy Charges for Today     Code Description Service Date Service Provider Modifiers Qty    [id number]  PT THER PROC EA 15 MIN 12/27/2021 Candida Low, PT GP 3                    Candida Low PT  12/27/2021

## 2021-12-30 ENCOUNTER — HOSPITAL ENCOUNTER (OUTPATIENT)
Dept: PHYSICAL THERAPY | Facility: HOSPITAL | Age: 81
Setting detail: THERAPIES SERIES
Discharge: HOME | End: 2021-12-30

## 2021-12-30 DIAGNOSIS — R26.89 IMPAIRED GAIT AND MOBILITY: Primary | ICD-10-CM

## 2021-12-30 DIAGNOSIS — Z74.09 IMPAIRED MOBILITY: ICD-10-CM

## 2021-12-30 PROCEDURE — 97110 THERAPEUTIC EXERCISES: CPT

## 2021-12-30 NOTE — THERAPY TREATMENT NOTE
Outpatient Physical Therapy Ortho Treatment Note  [facility]     Patient Name: Velma Bangura  : 1940  MRN: [id number]  Today's Date: 2021      Visit Date: 2021    Visit Dx:    ICD-10-CM ICD-9-CM   1. Impaired gait and mobility  R26.89 781.2   2. Impaired mobility  Z74.09 799.89       There is no problem list on file for this patient.       Past Medical History:   Diagnosis Date   • Arthritis         Past Surgical History:   Procedure Laterality Date   • BREAST LUMPECTOMY Left    • KNEE ARTHROPLASTY Left                         PT Assessment/Plan     Row Name 21 1200          PT Assessment    Assessment Comments Velma continues to demonstrate carry over from previous session and benefits from cues to take specific number of steps to reach destination requiring pt to perform large consistent steps. She tolerated the addition of standing with NBOS on foam, standing with trunk rotations, and forward/lateral steps with downward reach to target her ability to weight shift out of JOE. She would benefit from reviewing transfers in future sessions in attempt to receive carryover with sequencing of movements when performing sit-stand and stand-pivot transfers.  -DEMETRIUS            PT Plan    PT Plan Comments Review transfers, continue using counting method with walking  -DEMETRIUS           User Key  (r) = Recorded By, (t) = Taken By, (c) = Cosigned By    Initials Name Provider Type    Candida Vargas, PT Physical Therapist                   OP Exercises     Row Name 21 1100             Subjective Comments    Subjective Comments I am doing okay, a little tired  -DEMETRIUS              Total Minutes    45745 - PT Therapeutic Exercise Minutes 38  -DEMETRIUS              Exercise 1    Exercise Name 1 nustep  -DEMETRIUS      Cueing 1 Verbal  -DEMETRIUS      Reps 1 5  -DEMETRIUS      Time 1 UE/LE, L4, to encourage pelvic/trunk disassociation  -DEMETRIUS              Exercise 2    Exercise Name 2 HR  -DEMETRIUS      Reps 2 15  -DEMETRIUS               Exercise 5    Exercise Name 5 lateral stepping  -DEMETRIUS      Cueing 5 Verbal; Demo  -DEMETRIUS      Reps 5 3 laps  -DEMETRIUS      Time 5 take 6 steps  -DEMETRIUS      Additional Comments cues to look up  -DEMETRIUS              Exercise 6    Exercise Name 6 standing on foam  -DEMETRIUS      Cueing 6 Verbal; Demo  -DEMETRIUS      Time 6 1 mins  -DEMETRIUS      Additional Comments NBOS in // bars with CGA  -DEMETRIUS              Exercise 7    Exercise Name 7 staggered stance alt BUE punches  -DEMETRIUS      Cueing 7 Verbal; Demo  -DEMETRIUS      Reps 7 10e  -DEMETRIUS      Additional Comments to PT hand for target  -DEMETRIUS              Exercise 8    Exercise Name 8 for/lat step to downward reach to chair  -DEMETRIUS      Cueing 8 Verbal; Demo  -DEMETRIUS      Sets 8 1  -DEMETRIUS      Reps 8 10e  -DEMETRIUS      Additional Comments pink ball in // bars  -DEMETRIUS              Exercise 9    Exercise Name 9 gait in clinic  -DEMETRIUS              Exercise 10    Exercise Name 10 walking marches  -DEMETRIUS      Cueing 10 Verbal; Demo  -DEMETRIUS      Reps 10 6 steps, 4 laps, parallel bars  -DEMETRIUS      Time 10 cues for high knee, land on heel  -DEMETRIUS      Additional Comments using mirror  -DEMETRIUS              Exercise 14    Exercise Name 14 STS without UE assist  -DEMETRIUS      Sets 14 2  -DEMETRIUS      Reps 14 10  -DEMETRIUS      Time 14 standard chair with foam, attempted BUE flexion at terminal stance (does have R shoulder pain though)  -DEMETRIUS      Additional Comments pink ball  -DEMETRIUS              Exercise 17    Exercise Name 17 touch/tap 8 inch step, alteranting, 1 UE  -DEMETRIUS      Cueing 17 Verbal; Demo  -DEMETRIUS      Reps 17 15 alternating  -DEMETRIUS      Additional Comments elevated step to increased march  -DEMETRIUS              Exercise 18    Exercise Name 18 standing on floor with NBOS with trunk roation to ball tap on // bars  -DEMETRIUS      Cueing 18 Verbal; Demo  -DEMETRIUS      Sets 18 2  -DEMETRIUS      Reps 18 10  -DEMETRIUS      Additional Comments pink ball  -DEMETRIUS            User Key  (r) = Recorded By, (t) = Taken By, (c) = Cosigned By    Initials Name Provider Type    Candida Vargas, PT Physical Therapist                               PT OP Goals     Row Name 12/30/21 1100          PT Short Term Goals    STG Date to Achieve 12/17/21  -     STG 1 pt. to be I with initial HEP to facilitate self management of their condition  -     STG 1 Progress Ongoing  -     STG 2 pt. to be educated in/verbalize understanding of the importance of posture/ergonomics in association with their condition to facilitate self management of their condition  -     STG 2 Progress Ongoing; Progressing  -     STG 3 pt to demonstrate STS x 5 in </= 19 seconds with controlled eccentric phase to faciliate ease/safety at home  -     STG 3 Progress [facility]            Long Term Goals    LTG Date to Achieve 02/11/22  -     LTG 1 pt. to be I with advanced HEP to facilitate self management of their condition  -     LTG 1 Progress [facility]     LTG 2 pt to demonstrate STS x 5 in </= 15 seconds with controlled eccentric phase to faciliate ease/safety at home  -     LTG 2 Progress [facility]     LTG 3 pt to be able to ambulate >/= 280 feet during 2 tessa walk test with least restrictive device to facilitate ease/safety with household mobility  -     LTG 3 Progress Ongoing  [facility]     LTG 4 pt to demonstrate near normal heel to toe gait pattern(absennce of festinating gait pattern)  with least restrictive AD, without cuing, to facilitate ease/safety with household/community mobility  -     LT 4 [facility]     LTG 5 pt to be able to ascend/descend 2 steps with </= 1 rail to facilitate ease/safety with visiting son's house  -     LT 5 [facility]     LTG 6 pt to demonstrate no hesitation in gait pattern while traversing transitions in floor surfaces to facilitatae ease/safetty with household/community activities  -     LT 6 Progress [facility]           User Key  (r) = Recorded By, (t) = Taken By, (c) = Cosigned By    Initials Name Provider Type    Candida Vargas, PT Physical Therapist                                Time Calculation:   Start Time: 1115  Stop Time: 1153  Time Calculation (min): 38 min  Timed Charges  27720 - PT Therapeutic Exercise Minutes: 38  Total Minutes  Timed Charges Total Minutes: 38   Total Minutes: 38  Therapy Charges for Today     Code Description Service Date Service Provider Modifiers Qty    67598996245  PT THER PROC EA 15 MIN 12/30/2021 Candida Low, PT GP 3                    Candida Low, PT  12/30/2021

## 2022-01-03 ENCOUNTER — HOSPITAL ENCOUNTER (OUTPATIENT)
Dept: PHYSICAL THERAPY | Facility: HOSPITAL | Age: 82
Setting detail: THERAPIES SERIES
Discharge: HOME | End: 2022-01-03

## 2022-01-03 DIAGNOSIS — R26.89 IMPAIRED GAIT AND MOBILITY: Primary | ICD-10-CM

## 2022-01-03 DIAGNOSIS — Z74.09 IMPAIRED MOBILITY: ICD-10-CM

## 2022-01-03 PROCEDURE — 97110 THERAPEUTIC EXERCISES: CPT | Performed by: PHYSICAL THERAPIST

## 2022-01-03 NOTE — THERAPY TREATMENT NOTE
Outpatient Physical Therapy Ortho Treatment Note  [facility]     Patient Name: Velma Bangura  : 1940  MRN: [id number]  Today's Date: 1/3/2022      Visit Date: 2022    Visit Dx:    ICD-10-CM ICD-9-CM   1. Impaired gait and mobility  R26.89 781.2   2. Impaired mobility  Z74.09 799.89       There is no problem list on file for this patient.       Past Medical History:   Diagnosis Date   • Arthritis         Past Surgical History:   Procedure Laterality Date   • BREAST LUMPECTOMY Left    • KNEE ARTHROPLASTY Left                         PT Assessment/Plan     Row Name 22 1305          PT Assessment    Assessment Comments Ms. Bangura returns, demonstrating improved carryover of increased step length (able to perform 15-18 steps consistently large) with counting her steps. We worked on safe transitions from standing to sitting. SHe tends to dive for chairs adn flop. WE worked on making sure she is square to the chair, walker in place, one hand on walker and one on chair prior to sitting. ALso worked on safe sit to stand trnasition in simialr fashion with one hand on chair and one on walker. Ms. Bangura demonstrates improving functional mobility and carry over. She continues to be a good candidate for skilled physical therapy.  -GJ            PT Plan    PT Plan Comments work on sit to stand, stand to sit transfers, work on turning with larger steps.  May conside working with metronome for gait for more consistent, regular pace gait pattern  -           User Key  (r) = Recorded By, (t) = Taken By, (c) = Cosigned By    Initials Name Provider Type     Avelino Cruz, PT Physical Therapist                   OP Exercises     Row Name 22 1048 22 1000          Total Minutes    38694 - PT Therapeutic Exercise Minutes 42  -GJ --            Exercise 1    Exercise Name 1 -- nustep  -GJ     Cueing 1 -- Verbal  -GJ     Reps 1 -- 5  -GJ     Time 1 -- UE/LE, L4, to encourage pelvic/trunk disassociation   -GJ            Exercise 2    Exercise Name 2 -- HR  -GJ     Reps 2 -- 15  -GJ            Exercise 5    Exercise Name 5 -- lateral stepping  -GJ     Cueing 5 -- Verbal; Demo  -GJ     Reps 5 -- 3 laps  -GJ     Time 5 -- take 8 steps  -GJ     Additional Comments -- cues to look up  -GJ            Exercise 6    Exercise Name 6 -- standing on foam  -GJ     Cueing 6 -- Verbal; Demo  -GJ     Reps 6 -- 3  -GJ     Time 6 -- 45s  -GJ            Exercise 7    Exercise Name 7 -- staggered stance alt BUE punches  -GJ     Cueing 7 -- Verbal; Demo  -GJ     Reps 7 -- 10e  -GJ            Exercise 8    Exercise Name 8 -- for/lat step to downward reach to chair  -GJ     Cueing 8 -- Verbal; Demo  -GJ     Reps 8 -- 10e  -GJ     Time 8 -- this caused increased pain in her back  -GJ            Exercise 10    Exercise Name 10 -- walking marches  -GJ     Cueing 10 -- Verbal; Demo  -GJ     Reps 10 -- 8 steps, 4 laps, parallel bars  -GJ     Time 10 -- cues for high knee, land on heel  -GJ     Additional Comments -- mirror  -GJ            Exercise 14    Exercise Name 14 -- STS, standard chair, with RW, cues for one hand on chair, one hand on walker, cues for sit edge of chair, nose over toes and use momentum, don't stop  -GJ     Reps 14 -- 10  -GJ     Time 14 -- copious cues required  -GJ            Exercise 17    Exercise Name 17 -- touch/tap 8 inch step, alteranting, 1 UE  -GJ     Cueing 17 -- Verbal; Demo  -GJ     Sets 17 -- 2  -GJ     Reps 17 -- 20 alternating  -GJ     Additional Comments -- mirror for cue  -GJ           User Key  (r) = Recorded By, (t) = Taken By, (c) = Cosigned By    Initials Name Provider Type    GJ Avelino Cruz W, PT Physical Therapist                              PT OP Goals     Row Name 01/03/22 1000          PT Short Term Goals    STG Date to Achieve 12/17/21  -GJ     STG 1 pt. to be I with initial HEP to facilitate self management of their condition  -GJ     STG 1 Progress Ongoing; Progressing  -GJ     STG 2 pt.  to be educated in/verbalize understanding of the importance of posture/ergonomics in association with their condition to facilitate self management of their condition  -GJ     STG 2 Progress Ongoing; Progressing  -GJ     STG 3 pt to demonstrate STS x 5 in </= 19 seconds with controlled eccentric phase to faciliate ease/safety at home  -GJ     STG 3 Progress Ongoing  -GJ            Long Term Goals    LTG Date to Achieve 02/11/22  -GJ     LTG 1 pt. to be I with advanced HEP to facilitate self management of their condition  -GJ     LTG 1 Progress Ongoing  -GJ     LTG 2 pt to demonstrate STS x 5 in </= 15 seconds with controlled eccentric phase to faciliate ease/safety at home  -GJ     LTG 2 Progress Ongoing  -GJ     LTG 3 pt to be able to ambulate >/= 280 feet during 2 tessa walk test with least restrictive device to facilitate ease/safety with household mobility  -GJ     LTG 3 Progress Ongoing  -GJ     LTG 4 pt to demonstrate near normal heel to toe gait pattern(absennce of festinating gait pattern)  with least restrictive AD, without cuing, to facilitate ease/safety with household/community mobility  -     LTG 4 Progress Ongoing  -GJ     LTG 5 pt to be able to ascend/descend 2 steps with </= 1 rail to facilitate ease/safety with visiting son's house  -     LTG 5 Progress Ongoing  -GJ     LTG 6 pt to demonstrate no hesitation in gait pattern while traversing transitions in floor surfaces to facilitatae ease/safetty with household/community activities  -     LTG 6 Progress Ongoing  -           User Key  (r) = Recorded By, (t) = Taken By, (c) = Cosigned By    Initials Name Provider Type    Avelino Mitchell, PT Physical Therapist                Therapy Education  Education Details: educated on sit to/from stand one hand on walker, one hand on chair. Don't sit down until you are backed up and square to chair, walker close, one hand on walker one hadn on chair.  continue to count steps  Given: HEP,  Symptoms/condition management, Pain management, Posture/body mechanics, Mobility training  Program: Reinforced  How Provided: Verbal  Provided to: Patient  Level of Understanding: Teach back education performed, Verbalized, Demonstrated              Time Calculation:   Start Time: 1048  Stop Time: 1130  Time Calculation (min): 42 min  Timed Charges  39465 - PT Therapeutic Exercise Minutes: 42  Total Minutes  Timed Charges Total Minutes: 42   Total Minutes: 42  Therapy Charges for Today     Code Description Service Date Service Provider Modifiers Qty    79801811380 HC PT THER PROC EA 15 MIN 1/3/2022 Avelino Cruz, PT GP 3                    Avelino Cruz, PT  1/3/2022

## 2022-01-10 ENCOUNTER — HOSPITAL ENCOUNTER (OUTPATIENT)
Dept: PHYSICAL THERAPY | Facility: HOSPITAL | Age: 82
Setting detail: THERAPIES SERIES
Discharge: HOME | End: 2022-01-10

## 2022-01-10 DIAGNOSIS — R26.89 IMPAIRED GAIT AND MOBILITY: Primary | ICD-10-CM

## 2022-01-10 DIAGNOSIS — Z74.09 IMPAIRED MOBILITY: ICD-10-CM

## 2022-01-10 PROCEDURE — 97116 GAIT TRAINING THERAPY: CPT | Performed by: PHYSICAL THERAPIST

## 2022-01-10 PROCEDURE — 97110 THERAPEUTIC EXERCISES: CPT | Performed by: PHYSICAL THERAPIST

## 2022-01-10 NOTE — THERAPY TREATMENT NOTE
Outpatient Physical Therapy Ortho Treatment Note  [facility]     Patient Name: Velma Bangura  : 1940  MRN: [id number]  Today's Date: 1/10/2022      Visit Date: 01/10/2022    Visit Dx:    ICD-10-CM ICD-9-CM   1. Impaired gait and mobility  R26.89 781.2   2. Impaired mobility  Z74.09 799.89       There is no problem list on file for this patient.       Past Medical History:   Diagnosis Date   • Arthritis         Past Surgical History:   Procedure Laterality Date   • BREAST LUMPECTOMY Left    • KNEE ARTHROPLASTY Left                         PT Assessment/Plan     Row Name 01/10/22 1141          PT Assessment    Assessment Comments Ms. Bangura returns today, continuing to demonstrate good carry over of increased step length with cues of counting her steps. We continued to work on sit to/from transfers with RW and hand placement. Added work on sit to stand, walk to another chair and making sure she is squared to chair with walker in front of her prior to sitting.  Ms. Bangura was accompained by her daughter-in-law today. Ms. Bangura complains of LBP as session progresses, requiring seate rest breaks.  We went ahead and scheduled additional sessions for one more month.  Ms. Bangura continues to be a good candidate for skilled physical therapy.  -GJ            PT Plan    PT Plan Comments consider working on turning (maybe turn 90 to 180 degrees, counting steps, maybe 4 steps to turn 90 degrees, and 8 to turn 180, continue to work on transfer safety and gait  -           User Key  (r) = Recorded By, (t) = Taken By, (c) = Cosigned By    Initials Name Provider Type     Avelino Cruz, PT Physical Therapist                   OP Exercises     Row Name 01/10/22 1053 01/10/22 1000          Total Minutes    81558 - Gait Training Minutes  12  -GJ --     62077 - PT Therapeutic Exercise Minutes 30  -GJ --            Exercise 1    Exercise Name 1 -- nustep  -GJ     Reps 1 -- 5  -GJ     Time 1 -- UE/LE, L4, to encourage pelvic/trunk  disassociation  -GJ            Exercise 2    Exercise Name 2 -- HR  -GJ     Reps 2 -- 15  -GJ            Exercise 3    Exercise Name 3 -- sit to/from stand, walk 10 ft to another chair, turn and sit (down and back counting as 1 lap)  -GJ     Cueing 3 -- Verbal; Tactile; Demo  -GJ     Reps 3 -- 5 laps  -GJ     Time 3 -- cues for hand placemen with walker, cues to turn and keep walker with her, square to chair priot to sitting  -GJ            Exercise 5    Exercise Name 5 -- lateral stepping  -GJ     Cueing 5 -- Verbal; Demo  -GJ     Reps 5 -- 5 laps  -GJ     Time 5 -- 6-8 steps  -GJ     Additional Comments -- cues to keep head up;  -GJ            Exercise 6    Exercise Name 6 -- standing on foam  -GJ     Cueing 6 -- Verbal; Demo  -GJ     Reps 6 -- 3  -GJ     Time 6 -- 45s  -GJ            Exercise 10    Exercise Name 10 -- walking marches  -GJ     Cueing 10 -- Verbal; Demo  -GJ     Reps 10 -- 8 steps, 4 laps, parallel bars  -GJ     Time 10 -- cues for high knee, land on heel  -GJ            Exercise 14    Exercise Name 14 -- STS, standard chair, with RW, cues for one hand on chair, one hand on walker, cues for sit edge of chair, nose over toes and use momentum, don't stop  -GJ     Cueing 14 -- Verbal; Demo  -GJ     Reps 14 -- 10  -GJ     Time 14 -- decreasing cues for hand placement  -GJ            Exercise 17    Exercise Name 17 -- touch/tap 8 inch step, alteranting, 1 UE  -GJ     Cueing 17 -- Verbal; Demo  -GJ     Reps 17 -- 15, alternating  -GJ     Time 17 -- pt fatigued quickly today  -GJ           User Key  (r) = Recorded By, (t) = Taken By, (c) = Cosigned By    Initials Name Provider Type    Avelino Mitchell, PT Physical Therapist                              PT OP Goals     Row Name 01/10/22 1000          PT Short Term Goals    STG Date to Achieve 12/17/21  -GJ     STG 1 pt. to be I with initial HEP to facilitate self management of their condition  -GJ     STG 1 Progress Ongoing; Progressing  -GJ     STG 2  pt. to be educated in/verbalize understanding of the importance of posture/ergonomics in association with their condition to facilitate self management of their condition  -     STG 2 Progress Ongoing; Progressing  -     STG 3 pt to demonstrate STS x 5 in </= 19 seconds with controlled eccentric phase to faciliate ease/safety at home  -     STG 3 Progress Ongoing  -            Long Term Goals    LTG Date to Achieve 02/11/22  -     LTG 1 pt. to be I with advanced HEP to facilitate self management of their condition  -GJ     LTG 1 Progress Ongoing  -     LTG 2 pt to demonstrate STS x 5 in </= 15 seconds with controlled eccentric phase to faciliate ease/safety at home  -     LTG 2 Progress Ongoing  -     LTG 3 pt to be able to ambulate >/= 280 feet during 2 tessa walk test with least restrictive device to facilitate ease/safety with household mobility  -     LTG 3 Progress Ongoing  -     LTG 4 pt to demonstrate near normal heel to toe gait pattern(absennce of festinating gait pattern)  with least restrictive AD, without cuing, to facilitate ease/safety with household/community mobility  -     LTG 4 Progress Ongoing  -     LTG 5 pt to be able to ascend/descend 2 steps with </= 1 rail to facilitate ease/safety with visiting son's house  -     LTG 5 Progress Ongoing  -     LTG 6 pt to demonstrate no hesitation in gait pattern while traversing transitions in floor surfaces to facilitatae ease/safetty with household/community activities  -     LTG 6 Progress Ongoing  [facility]           User Key  (r) = Recorded By, (t) = Taken By, (c) = Cosigned By    Initials Name Provider Type    Avelino Mitchell, PT Physical Therapist                Therapy Education  Education Details: continued education on hand placement with AD and STS, education on sitting (overshoot chair, turn keeping walker in front of her, square to chair, then sit using one hand on walker one hand on chair  Given: HEP, Symptoms/condition  management, Pain management, Posture/body mechanics, Mobility training  Program: Reinforced, New, Progressed  How Provided: Verbal, Demonstration  Provided to: Patient  Level of Understanding: Teach back education performed, Verbalized, Demonstrated              Time Calculation:   Start Time: 1046  Stop Time: 1130  Time Calculation (min): 44 min  Timed Charges  49905 - PT Therapeutic Exercise Minutes: 30  62080 - Gait Training Minutes : 12  Total Minutes  Timed Charges Total Minutes: 42   Total Minutes: 42  Therapy Charges for Today     Code Description Service Date Service Provider Modifiers Qty    58763032576 HC PT THER PROC EA 15 MIN 1/10/2022 Avelino Cruz, PT GP 2    [id number]  GAIT TRAINING EA 15 MIN 1/10/2022 Avelino Cruz, PT GP 1                    Avelino Cruz, PT  1/10/2022

## 2022-01-13 ENCOUNTER — HOSPITAL ENCOUNTER (OUTPATIENT)
Dept: PHYSICAL THERAPY | Facility: HOSPITAL | Age: 82
Setting detail: THERAPIES SERIES
Discharge: HOME | End: 2022-01-13

## 2022-01-13 DIAGNOSIS — R26.89 IMPAIRED GAIT AND MOBILITY: Primary | ICD-10-CM

## 2022-01-13 DIAGNOSIS — Z74.09 IMPAIRED MOBILITY: ICD-10-CM

## 2022-01-13 PROCEDURE — 97116 GAIT TRAINING THERAPY: CPT

## 2022-01-13 PROCEDURE — 97110 THERAPEUTIC EXERCISES: CPT

## 2022-01-13 NOTE — THERAPY TREATMENT NOTE
Outpatient Physical Therapy Ortho Treatment Note  [facility]     Patient Name: Velma Bangura  : 1940  MRN: [id number]  Today's Date: 2022      Visit Date: 2022    Visit Dx:    ICD-10-CM ICD-9-CM   1. Impaired gait and mobility  R26.89 781.2   2. Impaired mobility  Z74.09 799.89       There is no problem list on file for this patient.       Past Medical History:   Diagnosis Date   • Arthritis         Past Surgical History:   Procedure Laterality Date   • BREAST LUMPECTOMY Left    • KNEE ARTHROPLASTY Left                         PT Assessment/Plan     Row Name 22 1100          PT Assessment    Assessment Comments Velma returns to PT with continuous demonstration of carryover when ambulating with RWx. Primary focus involved addressing step length/frequency when completing turns. Patient continues to require heavy cues to improve safety when turning to sit to complete transfers. Activity tolerance remains limited secondary to LBP. She remains a candidate for skilled PT.  -DEMETRIUS            PT Plan    PT Plan Comments PN needed. Continue to focus on transfers, turns and step frequency  -DEMETRIUS           User Key  (r) = Recorded By, (t) = Taken By, (c) = Cosigned By    Initials Name Provider Type    Candida Vargas, PT Physical Therapist                   OP Exercises     Row Name 22 1100             Subjective Comments    Subjective Comments Things are going okay today. My back is sore  -DEMETRIUS              Total Minutes    37454 - Gait Training Minutes  10  -DEMETRIUS      64386 - PT Therapeutic Exercise Minutes 30  -DEMETRIUS              Exercise 1    Exercise Name 1 nustep  -DEMETRIUS      Reps 1 5  -DEMETRIUS      Time 1 UE/LE, L4, to encourage pelvic/trunk disassociation  -DEMETRIUS              Exercise 2    Exercise Name 2 HR  -DEMETRIUS      Reps 2 15  -DEMETRIUS              Exercise 3    Exercise Name 3 sit to/from stand, walk 10 ft to another chair, turn and sit (down and back counting as 1 lap)  -DEMETRIUS      Reps 3 3 laps   -DEMETRIUS      Time 3 cues for hand placemen with walker, cues to turn and keep walker with her, square to chair priot to sitting  -DEMETRIUS              Exercise 4    Exercise Name 4 sit to/from stand, walk 10 ft to another chair, with 90 deg turn half way, turn and sit (down and back counting as 1 lap)  -DEMETRIUS      Cueing 4 Verbal; Tactile; Demo  -DEMETRIUS      Reps 4 3 laps  -DEMETRIUS      Time 4 cues for hand placemen with walker, cues to turn and keep walker with her, square to chair priot to sitting  -DEMETRIUS              Exercise 5    Exercise Name 5 lateral stepping  -DEMETRIUS      Cueing 5 Verbal; Demo  -DEMETRIUS      Reps 5 5 laps  -DEMETRIUS      Time 5 6-8 steps  -DEMETRIUS      Additional Comments cues to keep head up;  -DEMETRIUS              Exercise 10    Exercise Name 10 walking marches  -DEMETRIUS      Cueing 10 Verbal; Demo  -DEMETRIUS      Reps 10 8 steps, 5 laps, parallel bars  -DEMETRIUS      Time 10 cues for high knee, land on heel  -DEMETRIUS              Exercise 14    Exercise Name 14 STS, standard chair, with RW, cues for one hand on chair, one hand on walker, cues for sit edge of chair, nose over toes and use momentum, don't stop  -DEMETRIUS      Cueing 14 Verbal; Demo  -DEMETRIUS      Reps 14 10  -DEMETRIUS      Time 14 decreasing cues for hand placement  -DEMETRIUS              Exercise 17    Exercise Name 17 touch/tap 8 inch step, alteranting, 1 UE  -DEMETRIUS      Cueing 17 Verbal; Demo  -DEMETRIUS      Reps 17 15, alternating  -DEMETRIUS      Time 17 pt fatigued quickly today  -DEMETRIUS              Exercise 18    Exercise Name 18 turns within various exercises in // bars  -DEMETRIUS      Cueing 18 Verbal  -DEMETRIUS      Time 18 15 turns  -DEMETRIUS      Additional Comments cues for 4 steps to turn 180 deg  -DEMETRIUS            User Key  (r) = Recorded By, (t) = Taken By, (c) = Cosigned By    Initials Name Provider Type    Candida Vargas, PT Physical Therapist                              PT OP Goals     Row Name 01/13/22 1000          PT Short Term Goals    STG Date to Achieve 12/17/21  -DEMETRIUS     STG 1 pt. to be I with initial HEP to  facilitate self management of their condition  -     STG 1 Progress Ongoing; Progressing  -     STG 2 pt. to be educated in/verbalize understanding of the importance of posture/ergonomics in association with their condition to facilitate self management of their condition  -     STG 2 Progress Ongoing; Progressing  -     STG 3 pt to demonstrate STS x 5 in </= 19 seconds with controlled eccentric phase to faciliate ease/safety at home  -[facility] 3 Progress [facility]            Long Term Goals    LTG Date to Achieve 02/11/22  -     LTG 1 pt. to be I with advanced HEP to facilitate self management of their condition  -     LTG 1 Progress Ongoing  -     LTG 2 pt to demonstrate STS x 5 in </= 15 seconds with controlled eccentric phase to faciliate ease/safety at home  -     LT 2 Progress [facility]     LTG 3 pt to be able to ambulate >/= 280 feet during 2 tessa walk test with least restrictive device to facilitate ease/safety with household mobility  [facility]  [facility]     LTG 4 pt to demonstrate near normal heel to toe gait pattern(absennce of festinating gait pattern)  with least restrictive AD, without cuing, to facilitate ease/safety with household/community mobility  -[facility] 4 Progress [facility] 5 pt to be able to ascend/descend 2 steps with </= 1 rail to facilitate ease/safety with visiting son's house  -[facility] 5 [facility] 6 pt to demonstrate no hesitation in gait pattern while traversing transitions in floor surfaces to facilitatae ease/safetty with household/community activities  -[facility] 6 Progress [facility]           User Key  (r) = Recorded By, (t) = Taken By, (c) = Cosigned By    Initials Name Provider Type    Candida Vargas, PT Physical Therapist                               Time Calculation:   Start Time: 1047  Stop Time: 1127  Time Calculation (min): 40 min  Timed Charges  96908 - PT Therapeutic Exercise  Minutes: 30  25652 - Gait Training Minutes : 10  Total Minutes  Timed Charges Total Minutes: 40   Total Minutes: 40  Therapy Charges for Today     Code Description Service Date Service Provider Modifiers Qty    11489327273  PT THER PROC EA 15 MIN 1/13/2022 Candida Low, PT GP 2    [id number]  GAIT TRAINING EA 15 MIN 1/13/2022 Candida Low, PT GP 1                    Candida Low, PT  1/13/2022

## 2022-01-17 ENCOUNTER — HOSPITAL ENCOUNTER (OUTPATIENT)
Dept: PHYSICAL THERAPY | Facility: HOSPITAL | Age: 82
Setting detail: THERAPIES SERIES
Discharge: HOME | End: 2022-01-17

## 2022-01-17 DIAGNOSIS — Z74.09 IMPAIRED MOBILITY: ICD-10-CM

## 2022-01-17 DIAGNOSIS — R26.89 IMPAIRED GAIT AND MOBILITY: Primary | ICD-10-CM

## 2022-01-17 PROCEDURE — 97530 THERAPEUTIC ACTIVITIES: CPT | Performed by: PHYSICAL THERAPIST

## 2022-01-17 PROCEDURE — 97110 THERAPEUTIC EXERCISES: CPT | Performed by: PHYSICAL THERAPIST

## 2022-01-17 NOTE — THERAPY PROGRESS REPORT/RE-CERT
Outpatient Physical Therapy Ortho Progress Note  [facility]     Patient Name: Velma Bangura  : 1940  MRN: [id number]  Today's Date: 2022      Visit Date: 2022    Visit Dx:    ICD-10-CM ICD-9-CM   1. Impaired gait and mobility  R26.89 781.2   2. Impaired mobility  Z74.09 799.89       There is no problem list on file for this patient.       Past Medical History:   Diagnosis Date   • Arthritis         Past Surgical History:   Procedure Laterality Date   • BREAST LUMPECTOMY Left    • KNEE ARTHROPLASTY Left                         PT Assessment/Plan     Row Name 22 1153          PT Assessment    Functional Limitations Decreased safety during functional activities; Impaired gait; Impaired locomotion; Limitation in home management; Limitations in community activities; Performance in leisure activities  -GJ     Impairments Balance; Cognition; Endurance; Gait; Impaired flexibility; Impaired muscle length; Impaired muscle power; Muscle strength; Pain; Poor body mechanics; Posture; Range of motion  -GJ     Assessment Comments Ms. Bangura is an 80 y/o female. She has attended 13 sessions of physical therapy for decreased mobility/safety with functionanl transfers. Overall, she has been demonstrating progress with gait/transfer safety while demonstrating some carry over between sessions. Unfortuately, today's appointment was considerably earlier then her normal appointment, which may have affected her overall performance on special testing today.  She certainly demonstrated regression in performance and increased distractability today, again,, not indicative of the progress we have been seeing in the clinic.  Progress towards goals is poor, having met 1 of 3 STG's and 0 of 6 LTG's.  Ms. Bangura continues to be a good candiddate for skilled physical therapy.  -GJ     Please refer to paper survey for additional self-reported information Yes  -GJ     Rehab Potential Fair  -GJ     Patient/caregiver participated  in establishment of treatment plan and goals Yes  -GJ     Patient would benefit from skilled therapy intervention Yes  -GJ            PT Plan    PT Frequency 1x/week; 2x/week  -GJ     Predicted Duration of Therapy Intervention (PT) 10 visits  -GJ     Planned CPT's? PT RE-EVAL: 99211; PT THER PROC EA 15 MIN: 11867; PT THER ACT EA 15 MIN: 70289; PT MANUAL THERAPY EA 15 MIN: 82369; PT NEUROMUSC RE-EDUCATION EA 15 MIN: 59438; PT GAIT TRAINING EA 15 MIN: 82347; PT AQUATIC THERAPY EA 15 MIN: 94876; PT HOT OR COLD PACK TREAT MCARE  -GJ     PT Plan Comments continue to work on larger step length and carry over, sit to/from stand safety, turning profeciency, ? consider using agility ladder for stepping drills?  -           User Key  (r) = Recorded By, (t) = Taken By, (c) = Cosigned By    Initials Name Provider Type     Avelino Cruz, PT Physical Therapist                   OP Exercises     Row Name 01/17/22 0924 01/17/22 0900          Subjective Comments    Subjective Comments -- I'm doing ok, not sure if I'm making any great progress, but maybe I am  -            Total Minutes    06438 - PT Therapeutic Exercise Minutes 31  -GJ --     25220 - PT Therapeutic Activity Minutes 15  -GJ --            Exercise 1    Reps 1 -- 5  -GJ     Time 1 -- UE/LE, L4, to encourage pelvic/trunk disassociation  -GJ            Exercise 2    Exercise Name 2 -- HR  -GJ     Reps 2 -- 15  -GJ            Exercise 4    Exercise Name 4 -- sit to/froms tand, standard chair  -GJ     Sets 4 -- 2  -GJ     Reps 4 -- 5  -GJ     Time 4 -- standard chair, cues for hand placement  -GJ            Exercise 5    Exercise Name 5 -- lateral stepping  -GJ     Cueing 5 -- Verbal; Demo  -GJ     Reps 5 -- 5 laps  -GJ     Time 5 -- 6-8 steps  -GJ            Exercise 10    Exercise Name 10 -- walking marches  -GJ     Cueing 10 -- Verbal; Demo  -GJ     Reps 10 -- 8 steps, 4 laps, parallel bars  -GJ     Time 10 -- cues for high knee, land on heel  -GJ             Exercise 11    Exercise Name 11 -- assessment  -GJ     Time 11 -- 15 min  -GJ            Exercise 17    Exercise Name 17 -- touch/tap 8 inch step, alteranting, 1 UE  -GJ     Cueing 17 -- Verbal; Demo  -GJ     Sets 17 -- 2  -GJ     Reps 17 -- 20  -GJ     Additional Comments -- BUE  -GJ           User Key  (r) = Recorded By, (t) = Taken By, (c) = Cosigned By    Initials Name Provider Type     Avelino Cruz, PT Physical Therapist                              PT OP Goals     Row Name 01/17/22 0900          PT Short Term Goals    STG Date to Achieve 01/21/22  -GJ     STG 1 pt. to be I with initial HEP to facilitate self management of their condition  -GJ     STG 1 Progress Met  -GJ     STG 2 pt. to be educated in/verbalize understanding of the importance of posture/ergonomics in association with their condition to facilitate self management of their condition  -GJ     STG 2 Progress Ongoing  -GJ     STG 3 pt to demonstrate STS x 5 in </= 19 seconds with controlled eccentric phase to faciliate ease/safety at home  -GJ     STG 3 Progress Ongoing  -GJ     STG 3 Progress Comments 22 s, use of BUE, some mild flopping  -GJ            Long Term Goals    LTG Date to Achieve 03/18/22  -GJ     LTG 1 pt. to be I with advanced HEP to facilitate self management of their condition  -GJ     LTG 1 Progress Ongoing  -GJ     LTG 2 pt to demonstrate STS x 5 in </= 15 seconds with controlled eccentric phase to faciliate ease/safety at home  -GJ     LTG 2 Progress Ongoing  -GJ     LTG 2 Progress Comments 22  -GJ     LTG 3 pt to be able to ambulate >/= 280 feet during 2 tessa walk test with least restrictive device to facilitate ease/safety with household mobility  -GJ     LTG 3 Progress Ongoing  -GJ     LTG 3 Progress Comments 112, down from 119 at last re eval and 139 at initial evaluation  -GJ     LTG 4 pt to demonstrate near normal heel to toe gait pattern(absennce of festinating gait pattern)  with least restrictive AD, without cuing,  to facilitate ease/safety with household/community mobility  -GJ     LTG 4 Progress Ongoing  -GJ     LTG 4 Progress Comments continues to require cuing to avoid festinating pattern  -GJ     LTG 5 pt to be able to ascend/descend 2 steps with </= 1 rail to facilitate ease/safety with visiting son's house  -GJ     LTG 5 Progress Ongoing  -GJ     LTG 6 pt to demonstrate no hesitation in gait pattern while traversing transitions in floor surfaces to facilitatae ease/safetty with household/community activities  -GJ     LTG 6 Progress Ongoing  -GJ     LTG 6 Progress Comments continues with hesitation/freezing with transitions  -GJ           User Key  (r) = Recorded By, (t) = Taken By, (c) = Cosigned By    Initials Name Provider Type    Avelino Mitchell, PT Physical Therapist                Therapy Education  Education Details: discussed progress with pt and daughter in law, discussed timing of todays appointment may have affected overall performnce and today is likely not indicative of overall progress.  Cues for transfers, walker placement, hand placement on walker, large steps/count steps  Given: HEP, Symptoms/condition management, Pain management, Posture/body mechanics, Mobility training, Fall prevention and home safety  Program: Reinforced  How Provided: Verbal, Demonstration  Provided to: Patient, Caregiver  Level of Understanding: Verbalized    Outcome Measure Options: 2 Minute Walk Test, 5x Sit to Stand  2 Minute Walk Test  Gait, Assistive Device: rolling walker  Distance Ambulated in 2 Minutes: 112 (significant level of shuffling today, noted decline in performance today)  5 Times Sit to Stand  5 Times Sit to Stand (seconds): 22 seconds  5 Times Sit to Stand Comments: use of BUE, mild loss of eccentric control, better overall         Time Calculation:   Start Time: 0917  Stop Time: 1000  Time Calculation (min): 43 min  Timed Charges  85967 - PT Therapeutic Exercise Minutes: 31  30840 - PT Therapeutic Activity  Minutes: 15  Total Minutes  Timed Charges Total Minutes: 46   Total Minutes: 46  Therapy Charges for Today     Code Description Service Date Service Provider Modifiers Qty    15780691645 HC PT THER PROC EA 15 MIN 1/17/2022 Avelino Cruz, PT GP 2    [id number]  PT THERAPEUTIC ACT EA 15 MIN 1/17/2022 Avelino Cruz, PT GP 1          PT G-Codes  Outcome Measure Options: 2 Minute Walk Test, 5x Sit to Stand         Avelino Cruz, PT  1/17/2022

## 2022-01-20 ENCOUNTER — HOSPITAL ENCOUNTER (OUTPATIENT)
Dept: PHYSICAL THERAPY | Facility: HOSPITAL | Age: 82
Setting detail: THERAPIES SERIES
Discharge: HOME | End: 2022-01-20

## 2022-01-20 DIAGNOSIS — Z74.09 IMPAIRED MOBILITY: ICD-10-CM

## 2022-01-20 DIAGNOSIS — R26.89 IMPAIRED GAIT AND MOBILITY: Primary | ICD-10-CM

## 2022-01-20 PROCEDURE — 97116 GAIT TRAINING THERAPY: CPT

## 2022-01-20 PROCEDURE — 97110 THERAPEUTIC EXERCISES: CPT

## 2022-01-20 NOTE — THERAPY TREATMENT NOTE
Outpatient Physical Therapy Ortho Treatment Note  [facility]     Patient Name: Velma Bangura  : 1940  MRN: [id number]  Today's Date: 2022      Visit Date: 2022    Visit Dx:    ICD-10-CM ICD-9-CM   1. Impaired gait and mobility  R26.89 781.2   2. Impaired mobility  Z74.09 799.89       There is no problem list on file for this patient.       Past Medical History:   Diagnosis Date   • Arthritis         Past Surgical History:   Procedure Laterality Date   • BREAST LUMPECTOMY Left    • KNEE ARTHROPLASTY Left                         PT Assessment/Plan     Row Name 22 1700          PT Assessment    Assessment Comments Velma returns demonstrating limited carryover from previous session and continues to have intermittent shuffling, increased forward flexed posture and improper placement of RWx. Extended time spent reviewing RLE step length in attempt to improve sequence and stepping strategy. Session limited secondary to increased LBP causing patient to have poor activity tolerance and reduce attention to task. She remains a candidate for skilled PT.  -DEMETRIUS            PT Plan    PT Plan Comments Focus on STS and transfers  -DEMETRIUS           User Key  (r) = Recorded By, (t) = Taken By, (c) = Cosigned By    Initials Name Provider Type    Candida Vargas, PT Physical Therapist                   OP Exercises     Row Name 22 1700             Subjective Comments    Subjective Comments I am okay. My back is hurting  -DEMETRIUS              Subjective Pain    Subjective Pain Comment moderate  -DEMETRIUS              Total Minutes    92559 - Gait Training Minutes  10  -DEMETRIUS      46718 - PT Therapeutic Exercise Minutes 30  -DEMETRIUS              Exercise 1    Exercise Name 1 nustep  -DEMETRIUS      Reps 1 5  -DEMETRIUS      Time 1 UE/LE, L4, to encourage pelvic/trunk disassociation  -DEMETRIUS              Exercise 2    Exercise Name 2 HR  -DEMETRIUS      Reps 2 15  -DEMETRIUS              Exercise 3    Exercise Name 3 --  -DEMETRIUS      Reps 3 --  -DEMETRIUS       Time 3 --  -DEMETRIUS              Exercise 5    Exercise Name 5 lateral stepping  -DEMETRIUS      Cueing 5 Verbal; Demo  -DEMETRIUS      Reps 5 5 laps  -DEMETRIUS      Time 5 6-8 steps  -DEMETRIUS              Exercise 6    Exercise Name 6 standing on foam  -DEMETRIUS      Cueing 6 Verbal; Demo  -DEMETRIUS      Reps 6 3  -DEMETRIUS      Time 6 45s  -DEMETRIUS              Exercise 9    Exercise Name 9 step up onto foam  -DEMETRIUS      Cueing 9 Verbal; Demo  -DEMETRIUS      Sets 9 1  -DEMETRIUS      Reps 9 10e  -DEMETRIUS      Additional Comments UE support in // bars  -DEMETRIUS              Exercise 10    Exercise Name 10 walking marches  -DEMETRIUS      Cueing 10 Verbal; Demo  -DEMETRIUS      Reps 10 8 steps, 4 laps, parallel bars  -DEMETRIUS      Time 10 cues for high knee, land on heel  -DEMETRIUS              Exercise 14    Exercise Name 14 STS, standard chair, with RW, cues for one hand on chair, one hand on walker, cues for sit edge of chair, nose over toes and use momentum, don't stop  -DEMETRIUS      Cueing 14 Verbal; Demo  -DEMETRIUS      Reps 14 10  -DEMETRIUS      Time 14 decreasing cues for hand placement  -DEMETRIUS              Exercise 17    Exercise Name 17 touch/tap 8 inch step, alteranting, 1 UE  -DEMETRIUS      Cueing 17 Verbal; Demo  -DEMETRIUS      Sets 17 2  -DEMETRIUS      Reps 17 20  -DEMETRIUS      Additional Comments BUE  -DEMETRIUS              Exercise 18    Exercise Name 18 turns within various exercises in // bars  -DEMETRIUS      Cueing 18 Verbal  -DEMETRIUS      Time 18 15 turns  -DEMETRIUS              Exercise 19    Exercise Name 19 gait training down back griffin with R foot to color square/tape  -DEMETRIUS      Cueing 19 Verbal; Demo  -DEMETRIUS      Time 19 10 mins  -DEMETRIUS            User Key  (r) = Recorded By, (t) = Taken By, (c) = Cosigned By    Initials Name Provider Type    Candida Vargas, PT Physical Therapist                              PT OP Goals     Row Name 01/20/22 1700          PT Short Term Goals    STG Date to Achieve 01/21/22  -DEMETRIUS     STG 1 pt. to be I with initial HEP to facilitate self management of their condition  -DEMETRIUS     STG 1 Progress Met  -DEMETRIUS     STG 2 pt. to be  educated in/verbalize understanding of the importance of posture/ergonomics in association with their condition to facilitate self management of their condition  -     STG 2 Progress Ongoing  [facility]     STG 3 pt to demonstrate STS x 5 in </= 19 seconds with controlled eccentric phase to faciliate ease/safety at home  -     STG 3 Progress [facility]            Long Term Goals    LTG Date to Achieve 03/18/22  -     LT 1 pt. to be I with advanced HEP to facilitate self management of their condition  -     LTG 1 Progress [facility]     LTG 2 pt to demonstrate STS x 5 in </= 15 seconds with controlled eccentric phase to faciliate ease/safety at home  -     LT 2 Progress [facility]     LTG 3 pt to be able to ambulate >/= 280 feet during 2 tessa walk test with least restrictive device to facilitate ease/safety with household mobility  -[facility] 3 Progress [facility]     LTG 4 pt to demonstrate near normal heel to toe gait pattern(absennce of festinating gait pattern)  with least restrictive AD, without cuing, to facilitate ease/safety with household/community mobility  -     LT 4 Progress [facility]     LT 5 pt to be able to ascend/descend 2 steps with </= 1 rail to facilitate ease/safety with visiting son's house  -[facility] 5 [facility]     LT 6 pt to demonstrate no hesitation in gait pattern while traversing transitions in floor surfaces to facilitatae ease/safetty with household/community activities  -[facility] 6 Progress [facility]           User Key  (r) = Recorded By, (t) = Taken By, (c) = Cosigned By    Initials Name Provider Type    Candida Vargas, PT Physical Therapist                               Time Calculation:   Start Time: 1700  Stop Time: 1740  Time Calculation (min): 40 min  Timed Charges  68963 - PT Therapeutic Exercise Minutes: 30  95858 - Gait Training Minutes : 10  Total Minutes  Timed Charges Total Minutes: 40   Total Minutes: 40  Therapy Charges  for Today     Code Description Service Date Service Provider Modifiers Qty    69344811698 HC PT THER PROC EA 15 MIN 1/20/2022 Candida Low, PT GP 2    [id number] HC GAIT TRAINING EA 15 MIN 1/20/2022 Candida Low, PT GP 1                    Candida Low, PT  1/20/2022

## 2022-01-25 ENCOUNTER — HOSPITAL ENCOUNTER (OUTPATIENT)
Dept: PHYSICAL THERAPY | Facility: HOSPITAL | Age: 82
Setting detail: THERAPIES SERIES
Discharge: HOME | End: 2022-01-25

## 2022-01-25 DIAGNOSIS — R26.89 IMPAIRED GAIT AND MOBILITY: Primary | ICD-10-CM

## 2022-01-25 DIAGNOSIS — Z74.09 IMPAIRED MOBILITY: ICD-10-CM

## 2022-01-25 PROCEDURE — 97110 THERAPEUTIC EXERCISES: CPT

## 2022-01-25 PROCEDURE — 97530 THERAPEUTIC ACTIVITIES: CPT

## 2022-01-25 NOTE — THERAPY TREATMENT NOTE
Outpatient Physical Therapy Ortho Treatment Note  [facility]     Patient Name: Velam Bangura  : 1940  MRN: [id number]  Today's Date: 2022      Visit Date: 2022    Visit Dx:    ICD-10-CM ICD-9-CM   1. Impaired gait and mobility  R26.89 781.2   2. Impaired mobility  Z74.09 799.89       There is no problem list on file for this patient.       Past Medical History:   Diagnosis Date   • Arthritis         Past Surgical History:   Procedure Laterality Date   • BREAST LUMPECTOMY Left    • KNEE ARTHROPLASTY Left                         PT Assessment/Plan     Row Name 22 1100          PT Assessment    Assessment Comments Velma demonstrates mild carryover when performing stand pivot transfer and continues to benefit from cues to  complete desired number of steps when ambulating from chair to chair and when turning to sit. She also benefits from addition of stepping over hurdles forward and lateral to address hip/knee flexion to improve B foot clearance and reduce shuffling gait pattern. She continues to demonstrate intermittent festinating gait and significant forward flexed trunk posture when ambulating with RWx and improved gait pattern and in parallel bars. She would benefit from continued skilled PT to address gait and transfer deficits.  -DEMETRIUS            PT Plan    PT Plan Comments Resume forward/lateral step with rotation, step up onto foam and continue to focus on gait and transfers  -           User Key  (r) = Recorded By, (t) = Taken By, (c) = Cosigned By    Initials Name Provider Type    Candida Vargas, PT Physical Therapist                   OP Exercises     Row Name 22 1000             Subjective Comments    Subjective Comments Things are fine. I am tired today  -DEMETRIUS              Total Minutes    27065 - PT Therapeutic Exercise Minutes 23  -DEMETRIUS      63918 - PT Therapeutic Activity Minutes 15  -DEMETRIUS              Exercise 1    Exercise Name 1 nustep  -DEMETRIUS      Reps 1 5  -DEMETRIUS       Time 1 UE/LE, L4, to encourage pelvic/trunk disassociation  -DEMETRIUS              Exercise 2    Exercise Name 2 HR  -DEMETRIUS      Reps 2 15  -DEMETRIUS              Exercise 3    Exercise Name 3 sit to/from stand, walk 10 ft to another chair, turn and sit (down and back counting as 1 lap)  -DEMETRIUS      Reps 3 3 laps  -DEMETRIUS      Time 3 cues for hand placemen with walker, cues to turn and keep walker with her, square to chair priot to sitting  -DEMETRIUS              Exercise 4    Exercise Name 4 sit to/from stand, walk 10 ft to another chair, with 90 deg turn half way, turn and sit (down and back counting as 1 lap)  -DEMETRIUS      Cueing 4 Verbal; Tactile; Demo  -DEMETRIUS      Reps 4 3 laps  -DEMETRIUS      Time 4 cues for hand placemen with walker, cues to turn and keep walker with her, square to chair priot to sitting  -DEMETRIUS              Exercise 5    Exercise Name 5 lateral stepping  -DEMETRIUS      Cueing 5 Verbal; Demo  -DEMETRIUS      Reps 5 5 laps  -DEMETRIUS      Time 5 6-8 steps  -DEMETRIUS              Exercise 6    Exercise Name 6 standing on foam  -DEMETRIUS      Cueing 6 Verbal; Demo  -DEMETRIUS      Reps 6 3  -DEMETRIUS      Time 6 45s  -DEMETRIUS              Exercise 10    Exercise Name 10 walking marches  -DEMETRIUS      Cueing 10 Verbal; Demo  -DEMETRIUS      Reps 10 8 steps, 4 laps, parallel bars  -DEMETRIUS      Time 10 cues for high knee, land on heel  -DEMETRIUS              Exercise 12    Exercise Name 12 stepping over hurdles  -DEMETRIUS      Cueing 12 Verbal; Tactile; Demo  -DEMETRIUS      Time 12 3 mins each  -DEMETRIUS      Additional Comments fwd/lat  -DEMETRIUS              Exercise 13    Exercise Name 13 stand pivot transfer  -DEMETRIUS      Cueing 13 Verbal; Demo  -DEMETRIUS      Sets 13 1  -DEMETRIUS      Reps 13 5  -DEMETRIUS              Exercise 17    Exercise Name 17 touch/tap 8 inch step, alteranting, 1 UE  -DEMETRIUS      Cueing 17 Verbal; Demo  -DEMETIRUS      Sets 17 2  -DEMETRIUS      Reps 17 20  -DEMETRIUS              Exercise 18    Exercise Name 18 turns within various exercises in // bars  -DEMETRIUS      Cueing 18 Verbal  -DEMETRIUS      Time 18 15 turns  -DEMETRIUS              Exercise 19    Exercise  Name 19 gait training down back griffin with R foot to color square/tape  -      Cueing 19 Verbal; Demo  -      Time 19 10 mins  -            User Key  (r) = Recorded By, (t) = Taken By, (c) = Cosigned By    Initials Name Provider Type    Candida Vargas, PT Physical Therapist                              PT OP Goals     Row Name 01/25/22 1000          PT Short Term Goals    STG Date to Achieve 01/21/22  -     STG 1 pt. to be I with initial HEP to facilitate self management of their condition  -     STG 1 Progress Met  -     STG 2 pt. to be educated in/verbalize understanding of the importance of posture/ergonomics in association with their condition to facilitate self management of their condition  -     STG 2 Progress Ongoing  -     STG 3 pt to demonstrate STS x 5 in </= 19 seconds with controlled eccentric phase to faciliate ease/safety at home  -     STG 3 Progress Ongoing  [facility]            Long Term Goals    LTG Date to Achieve 03/18/22  -     LTG 1 pt. to be I with advanced HEP to facilitate self management of their condition  -     LTG 1 Progress Ongoing  -     LTG 2 pt to demonstrate STS x 5 in </= 15 seconds with controlled eccentric phase to faciliate ease/safety at home  -     LTG 2 Progress Ongoing  [facility]     LTG 3 pt to be able to ambulate >/= 280 feet during 2 tessa walk test with least restrictive device to facilitate ease/safety with household mobility  -     LTG 3 Progress Ongoing  [facility]     LTG 4 pt to demonstrate near normal heel to toe gait pattern(absennce of festinating gait pattern)  with least restrictive AD, without cuing, to facilitate ease/safety with household/community mobility  -     LTG 4 Progress Ongoing  [facility]     LTG 5 pt to be able to ascend/descend 2 steps with </= 1 rail to facilitate ease/safety with visiting son's house  -     LT 5 Progress Ongoing  [facility]     LTG 6 pt to demonstrate no hesitation in gait pattern while traversing transitions in floor  surfaces to facilitatae ease/safetty with household/community activities  -DEMETRIUS     LTG 6 Progress Ongoing  -DEMETRIUS           User Key  (r) = Recorded By, (t) = Taken By, (c) = Cosigned By    Initials Name Provider Type    Candida Vargas, PT Physical Therapist                               Time Calculation:   Start Time: 1006  Stop Time: 1044  Time Calculation (min): 38 min  Timed Charges  14241 - PT Therapeutic Exercise Minutes: 23  53566 - PT Therapeutic Activity Minutes: 15  Total Minutes  Timed Charges Total Minutes: 38   Total Minutes: 38  Therapy Charges for Today     Code Description Service Date Service Provider Modifiers Qty    27023090885  PT THER PROC EA 15 MIN 1/25/2022 Candida Low, PT GP 2    [id number]  PT THERAPEUTIC ACT EA 15 MIN 1/25/2022 Candida Low, PT GP 1                    Candida Low, PT  1/25/2022

## 2022-01-27 ENCOUNTER — HOSPITAL ENCOUNTER (OUTPATIENT)
Dept: PHYSICAL THERAPY | Facility: HOSPITAL | Age: 82
Setting detail: THERAPIES SERIES
Discharge: HOME | End: 2022-01-27

## 2022-01-27 DIAGNOSIS — R26.89 IMPAIRED GAIT AND MOBILITY: Primary | ICD-10-CM

## 2022-01-27 DIAGNOSIS — Z74.09 IMPAIRED MOBILITY: ICD-10-CM

## 2022-01-27 PROCEDURE — 97110 THERAPEUTIC EXERCISES: CPT

## 2022-01-27 PROCEDURE — 97530 THERAPEUTIC ACTIVITIES: CPT

## 2022-01-27 NOTE — THERAPY TREATMENT NOTE
Outpatient Physical Therapy Ortho Treatment Note  [facility]     Patient Name: Velma Bangura  : 1940  MRN: [id number]  Today's Date: 2022      Visit Date: 2022    Visit Dx:    ICD-10-CM ICD-9-CM   1. Impaired gait and mobility  R26.89 781.2   2. Impaired mobility  Z74.09 799.89       There is no problem list on file for this patient.       Past Medical History:   Diagnosis Date   • Arthritis         Past Surgical History:   Procedure Laterality Date   • BREAST LUMPECTOMY Left    • KNEE ARTHROPLASTY Left                         PT Assessment/Plan     Row Name 22 1100          PT Assessment    Assessment Comments Velma continues to demonstrate ability to maintain long step length for approximately 10-15 steps before returning to shuffling/festinating gait pattern. Session limited secondary to increased fatigue level upon arrival and intermittent LBP, requiring frequent rest breaks. Patient tolerated addition of standing on foam with eyes closed, static tandem stance and forward/lateral step ups onto foam. She would benefit from continued skilled PT.  -DEMETRIUS            PT Plan    PT Plan Comments If fatigue and back better, resume gait/transfer training  -DEMETRIUS           User Key  (r) = Recorded By, (t) = Taken By, (c) = Cosigned By    Initials Name Provider Type    Candida Vargas, PT Physical Therapist                   OP Exercises     Row Name 22 1000             Subjective Comments    Subjective Comments I am more tired than usual today  -DEMETRIUS              Total Minutes    77622 - PT Therapeutic Exercise Minutes 28  -DEMETRIUS      55748 - PT Therapeutic Activity Minutes 10  -DEMETRIUS              Exercise 1    Exercise Name 1 nustep  -DEMETRIUS      Reps 1 5  -DEMETRIUS      Time 1 UE/LE, L4, to encourage pelvic/trunk disassociation  -DEMETRIUS              Exercise 2    Exercise Name 2 HR  -DEMETRIUS      Reps 2 15  -DEMETRIUS              Exercise 5    Exercise Name 5 lateral stepping  -DEMETRIUS      Cueing 5 Verbal; Demo   -DEMETRIUS      Reps 5 5 laps  -DEMETRIUS      Time 5 6-8 steps  -DEMETRIUS              Exercise 6    Exercise Name 6 standing on foam  -DEMETRIUS      Cueing 6 Verbal; Demo  -DEMETRIUS      Reps 6 3  -DEMETRIUS      Time 6 30s  -DEMETRIUS      Additional Comments EC, CGA  -DEMETRIUS              Exercise 9    Exercise Name 9 step up onto foam  -DEMETRIUS      Cueing 9 Verbal; Demo  -DEMETRIUS      Sets 9 1  -DEMETRIUS      Reps 9 10e  -DEMETRIUS      Time 9 fwd/lat  -DEMETRIUS      Additional Comments UE support in // bars  -DEMETRIUS              Exercise 10    Exercise Name 10 walking marches  -DEMETRIUS      Cueing 10 Verbal; Demo  -DEMETRIUS      Reps 10 8 steps, 4 laps, parallel bars  -DEMETRIUS      Time 10 cues for high knee, land on heel  -DEMETRIUS              Exercise 11    Exercise Name 11 tandem stance  -DEMETRIUS      Cueing 11 Verbal; Demo  -DEMETRIUS      Sets 11 1  -DEMETRIUS      Reps 11 15e  -DEMETRIUS      Additional Comments Uni UE assist, CGA  -DEMETRIUS              Exercise 12    Exercise Name 12 stepping over hurdles  -DEMETRIUS      Cueing 12 Verbal; Tactile; Demo  -DEMETRIUS      Time 12 3 mins each  -DEMETRIUS      Additional Comments fwd/lat  -DEMETRIUS              Exercise 14    Exercise Name 14 STS, standard chair, with RW, cues for one hand on chair, one hand on walker, cues for sit edge of chair, nose over toes and use momentum, don't stop  -DEMETRIUS      Cueing 14 Verbal; Demo  -DEMETRIUS      Reps 14 10  -DEMETRIUS      Time 14 decreasing cues for hand placement  -DEMETRIUS      Additional Comments performed throughout session  -DEMETRIUS              Exercise 18    Exercise Name 18 turns within various exercises in // bars  -DEMETRIUS      Cueing 18 Verbal  -DEMETRIUS      Time 18 15 turns  -DEMETRIUS            User Key  (r) = Recorded By, (t) = Taken By, (c) = Cosigned By    Initials Name Provider Type    Candida Vargas, PT Physical Therapist                              PT OP Goals     Row Name 01/27/22 1000          PT Short Term Goals    STG Date to Achieve 01/21/22  -DEMETRIUS     STG 1 pt. to be I with initial HEP to facilitate self management of their condition  -DEMETRIUS     STG 1 Progress Met  -DEMETRIUS     STG 2  pt. to be educated in/verbalize understanding of the importance of posture/ergonomics in association with their condition to facilitate self management of their condition  -     STG 2 Progress Ongoing  [facility]     STG 3 pt to demonstrate STS x 5 in </= 19 seconds with controlled eccentric phase to faciliate ease/safety at home  -     STG 3 Progress [facility]            Long Term Goals    LTG Date to Achieve 03/18/22  -     LTG 1 pt. to be I with advanced HEP to facilitate self management of their condition  -     LTG 1 Progress [facility]     LTG 2 pt to demonstrate STS x 5 in </= 15 seconds with controlled eccentric phase to faciliate ease/safety at home  -     LTG 2 Progress [facility]     LTG 3 pt to be able to ambulate >/= 280 feet during 2 tessa walk test with least restrictive device to facilitate ease/safety with household mobility  -     LT 3 Progress [facility]     LTG 4 pt to demonstrate near normal heel to toe gait pattern(absennce of festinating gait pattern)  with least restrictive AD, without cuing, to facilitate ease/safety with household/community mobility  -     LT 4 Progress [facility]     LT 5 pt to be able to ascend/descend 2 steps with </= 1 rail to facilitate ease/safety with visiting son's house  -[facility] 5 [facility] 6 pt to demonstrate no hesitation in gait pattern while traversing transitions in floor surfaces to facilitatae ease/safetty with household/community activities  -[facility] 6 Progress [facility]           User Key  (r) = Recorded By, (t) = Taken By, (c) = Cosigned By    Initials Name Provider Type    Candida Vargas, PT Physical Therapist                               Time Calculation:   Start Time: 1045  Stop Time: 1123  Time Calculation (min): 38 min  Timed Charges  92673 - PT Therapeutic Exercise Minutes: 28  14417 - PT Therapeutic Activity Minutes: 10  Total Minutes  Timed Charges Total Minutes: 38   Total Minutes:  38  Therapy Charges for Today     Code Description Service Date Service Provider Modifiers Qty    79530109243  PT THER PROC EA 15 MIN 1/27/2022 Candida Low, PT GP 2    [id number]  PT THERAPEUTIC ACT EA 15 MIN 1/27/2022 Candida Low, PT GP 1                    Candida Low, PT  1/27/2022

## 2022-02-01 ENCOUNTER — HOSPITAL ENCOUNTER (OUTPATIENT)
Dept: PHYSICAL THERAPY | Facility: HOSPITAL | Age: 82
Setting detail: THERAPIES SERIES
Discharge: HOME | End: 2022-02-01

## 2022-02-01 DIAGNOSIS — R26.89 IMPAIRED GAIT AND MOBILITY: Primary | ICD-10-CM

## 2022-02-01 DIAGNOSIS — Z74.09 IMPAIRED MOBILITY: ICD-10-CM

## 2022-02-01 PROCEDURE — 97140 MANUAL THERAPY 1/> REGIONS: CPT | Performed by: PHYSICAL THERAPIST

## 2022-02-01 PROCEDURE — 97110 THERAPEUTIC EXERCISES: CPT | Performed by: PHYSICAL THERAPIST

## 2022-02-01 PROCEDURE — 97112 NEUROMUSCULAR REEDUCATION: CPT | Performed by: PHYSICAL THERAPIST

## 2022-02-01 NOTE — THERAPY TREATMENT NOTE
Outpatient Physical Therapy Ortho Treatment Note  [facility]     Patient Name: Velma Bangura  : 1940  MRN: [id number]  Today's Date: 2022      Visit Date: 2022    Visit Dx:    ICD-10-CM ICD-9-CM   1. Impaired gait and mobility  R26.89 781.2   2. Impaired mobility  Z74.09 799.89       There is no problem list on file for this patient.       Past Medical History:   Diagnosis Date   • Arthritis         Past Surgical History:   Procedure Laterality Date   • BREAST LUMPECTOMY Left    • KNEE ARTHROPLASTY Left                         PT Assessment/Plan     Row Name 22 1510          PT Assessment    Assessment Comments Ms. Bangura returns today, initially not reporting too much back pain. She continues to demosntrate improving carry over of ability to take larger step lengths during gait. She continues to push the walker too far ahead of her, but is improving in her ability to self adjust (stop, draw walker back to her). Overall, her performance with exercises is improving.  We did initiate a trial of STM to bilateral lumbar paraspinal tissues, which hopefully will help with decreasing guarding and improving overall freedom of mobility. She demonstrated significant tautness of these tissue and responded favorably.  Ms. Bangura continues to be a good candidate for skilled physical therapy.  -GJ            PT Plan    PT Plan Comments assess response to STM bilateral L spine paraspinal tissues.  consider entire session of soft tissue work to L spine to see if this has any last effect on her overall mobility.  Continue to work on functional mobility/gait safety  -HARRIS           User Key  (r) = Recorded By, (t) = Taken By, (c) = Cosigned By    Initials Name Provider Type    Avelino Mitchell, PT Physical Therapist                   OP Exercises     Row Name 22 1323 22 1300          Subjective Comments    Subjective Comments -- my back is ok, my (R) shoulder is bothering me though  -HARRIS             Total Minutes    57749 - PT Therapeutic Exercise Minutes 14  -GJ --     39734 -  PT Neuromuscular Reeducation Minutes 10  -GJ --     77862 - PT Manual Therapy Minutes 16  -GJ --            Exercise 1    Exercise Name 1 -- nustep  -GJ     Reps 1 -- 5  -GJ            Exercise 2    Exercise Name 2 -- HR  -GJ     Reps 2 -- 15  -GJ            Exercise 5    Exercise Name 5 -- lateral stepping  -GJ     Cueing 5 -- Verbal; Demo  -GJ     Reps 5 -- 5 laps  -GJ     Time 5 -- 6-8 steps  -GJ     Additional Comments -- 2 UE's  -GJ            Exercise 6    Exercise Name 6 -- standing on foam  -GJ     Cueing 6 -- Verbal; Demo  -GJ     Reps 6 -- 3  -GJ     Time 6 -- 30s  -GJ     Additional Comments -- EC, CGA  -GJ            Exercise 9    Exercise Name 9 -- step up onto foam  -GJ     Cueing 9 -- Verbal; Demo  -GJ     Sets 9 -- 1  -GJ     Reps 9 -- 10e  -GJ     Time 9 -- fwd/lat  -GJ     Additional Comments -- UE support in // bars ( 1-2 hands)  -GJ            Exercise 10    Exercise Name 10 -- walking marches  -GJ     Cueing 10 -- Verbal; Demo  -GJ     Reps 10 -- 8 steps, 4 laps, parallel bars  -GJ     Time 10 -- cues for high knee, land on heel  -GJ            Exercise 11    Exercise Name 11 -- tandem stance  -GJ     Cueing 11 -- Verbal; Demo  -GJ     Sets 11 -- 1  -GJ     Reps 11 -- 15e  -GJ     Time 11 -- each foot in front  -GJ     Additional Comments -- Uni UE assist, CGA  -GJ           User Key  (r) = Recorded By, (t) = Taken By, (c) = Cosigned By    Initials Name Provider Type    GJ Avelino Cruz, PT Physical Therapist                         Manual Rx (last 36 hours)     Manual Treatments     Row Name 02/01/22 1323 02/01/22 1300          Total Minutes    91587 - PT Manual Therapy Minutes 16  -GJ --            Manual Rx 1    Manual Rx 1 Location -- trial of STM to bilateral L spine paraspinal tissues,  -GJ     Manual Rx 1 Type -- pt seated leaning forward to high low table  -GJ           User Key  (r) = Recorded By, (t) =  Taken By, (c) = Cosigned By    Initials Name Provider Type    HARRIS Cruz Avelino MALISSA, PT Physical Therapist                 PT OP Goals     Row Name 02/01/22 1300          PT Short Term Goals    STG Date to Achieve 01/21/22  -GJ     STG 1 pt. to be I with initial HEP to facilitate self management of their condition  -GJ     STG 1 Progress Met  -GJ     STG 2 pt. to be educated in/verbalize understanding of the importance of posture/ergonomics in association with their condition to facilitate self management of their condition  -GJ     STG 2 Progress Partially Met  -GJ     STG 3 pt to demonstrate STS x 5 in </= 19 seconds with controlled eccentric phase to faciliate ease/safety at home  -GJ     STG 3 Progress Ongoing  -            Long Term Goals    LTG Date to Achieve 03/18/22  -GJ     LTG 1 pt. to be I with advanced HEP to facilitate self management of their condition  -GJ     LTG 1 Progress Ongoing  -     LTG 2 pt to demonstrate STS x 5 in </= 15 seconds with controlled eccentric phase to faciliate ease/safety at home  -     LTG 2 Progress Ongoing  -     LTG 3 pt to be able to ambulate >/= 280 feet during 2 tessa walk test with least restrictive device to facilitate ease/safety with household mobility  -     LTG 3 Progress Ongoing  -     LTG 4 pt to demonstrate near normal heel to toe gait pattern(absennce of festinating gait pattern)  with least restrictive AD, without cuing, to facilitate ease/safety with household/community mobility  -     LTG 4 Progress Ongoing  -     LTG 5 pt to be able to ascend/descend 2 steps with </= 1 rail to facilitate ease/safety with visiting son's house  -     LTG 5 Progress Ongoing  -     LTG 6 pt to demonstrate no hesitation in gait pattern while traversing transitions in floor surfaces to facilitatae ease/safetty with household/community activities  -     LTG 6 Progress Ongoing  -           User Key  (r) = Recorded By, (t) = Taken By, (c) = Cosigned By    Initials  Name Provider Type     Avelino Cruz, PT Physical Therapist                Therapy Education  Given: HEP, Symptoms/condition management, Pain management, Posture/body mechanics, Mobility training  Program: Reinforced  How Provided: Verbal, Demonstration  Provided to: Patient  Level of Understanding: Verbalized, Demonstrated              Time Calculation:   Start Time: 1319  Stop Time: 1400  Time Calculation (min): 41 min  Timed Charges  64619 - PT Therapeutic Exercise Minutes: 14  04345 -  PT Neuromuscular Reeducation Minutes: 10  29712 - PT Manual Therapy Minutes: 16  Total Minutes  Timed Charges Total Minutes: 40   Total Minutes: 40  Therapy Charges for Today     Code Description Service Date Service Provider Modifiers Qty    [id number]  PT NEUROMUSC RE EDUCATION EA 15 MIN 2/1/2022 Avelino Cruz, PT GP 1    [id number] HC PT THER PROC EA 15 MIN 2/1/2022 Avelino Cruz, PT GP 1    [id number]  PT MANUAL THERAPY EA 15 MIN 2/1/2022 Avelino Cruz, PT GP 1                    Avelino Cruz PT  2/1/2022

## 2022-02-07 ENCOUNTER — HOSPITAL ENCOUNTER (OUTPATIENT)
Dept: PHYSICAL THERAPY | Facility: HOSPITAL | Age: 82
Setting detail: THERAPIES SERIES
Discharge: HOME | End: 2022-02-07

## 2022-02-07 DIAGNOSIS — R26.89 IMPAIRED GAIT AND MOBILITY: Primary | ICD-10-CM

## 2022-02-07 DIAGNOSIS — Z74.09 IMPAIRED MOBILITY: ICD-10-CM

## 2022-02-07 PROCEDURE — 97140 MANUAL THERAPY 1/> REGIONS: CPT | Performed by: PHYSICAL THERAPIST

## 2022-02-07 PROCEDURE — 97530 THERAPEUTIC ACTIVITIES: CPT | Performed by: PHYSICAL THERAPIST

## 2022-02-07 NOTE — THERAPY TREATMENT NOTE
Outpatient Physical Therapy Ortho Treatment Note  [facility]     Patient Name: Velma Bangura  : 1940  MRN: [id number]  Today's Date: 2022      Visit Date: 2022    Visit Dx:    ICD-10-CM ICD-9-CM   1. Impaired gait and mobility  R26.89 781.2   2. Impaired mobility  Z74.09 799.89       There is no problem list on file for this patient.       Past Medical History:   Diagnosis Date   • Arthritis         Past Surgical History:   Procedure Laterality Date   • BREAST LUMPECTOMY Left    • KNEE ARTHROPLASTY Left                         PT Assessment/Plan     Row Name 22 1506          PT Assessment    Assessment Comments Ms. Bangura returns presenting with noted increase in shuffle gait pattern today. We decided to concentrate on STM to the L spine/T spine paraspinal tissues today and included stretching of bilateral hip flexor/ quad tissue, in an attempt to improved hip girdle/L spine mobility, possibly allowing for improved functional mobililty.  She did demonstrate rather taught paraspinal tissues which resolved somewhat following manual. She even demonstrated improved step length following manual.  Reviewed importance of keeping walker close to her. Ms. Bangura continues to be a candidate for skilled physical therapy.  -GJ            PT Plan    PT Plan Comments assess response to STM paraspinal tissue. likely return to functional mobility training, working on hip girdle/core strength,, step length, turns.  Repeat STM as needed  -           User Key  (r) = Recorded By, (t) = Taken By, (c) = Cosigned By    Initials Name Provider Type    Avelino Mitchell, PT Physical Therapist                   OP Exercises     Row Name 22 1500 22 1400          Subjective Comments    Subjective Comments I'm doing ok  - --            Total Minutes    33288 - PT Therapeutic Activity Minutes -- 10  education  -     96862 - PT Manual Therapy Minutes -- 30  -GJ            Exercise 1    Exercise Name 1  nustep  -GJ --     Reps 1 5  -GJ --           User Key  (r) = Recorded By, (t) = Taken By, (c) = Cosigned By    Initials Name Provider Type    Avelino Mitchell, PT Physical Therapist                         Manual Rx (last 36 hours)     Manual Treatments     Row Name 02/07/22 1500 02/07/22 1400          Total Minutes    62670 - PT Manual Therapy Minutes -- 30  -GJ            Manual Rx 1    Manual Rx 1 Location STM to bilateral L/T spine paraspinal tissues, bilateral QL tissues  -GJ --     Manual Rx 1 Type pt prone,nose in nose hole, bolster under feet  -GJ --            Manual Rx 2    Manual Rx 2 Location manual stretch , bilateral hip flexor/quad tissues, ,pt prone(gentle on L secondary PMhx of L TKA)  -GJ --           User Key  (r) = Recorded By, (t) = Taken By, (c) = Cosigned By    Initials Name Provider Type    Avelino Mitchell, PT Physical Therapist                 PT OP Goals     Row Name 02/07/22 1400          PT Short Term Goals    STG Date to Achieve 01/21/22  -GJ     STG 1 pt. to be I with initial HEP to facilitate self management of their condition  -GJ     STG 1 Progress Met  -GJ     STG 2 pt. to be educated in/verbalize understanding of the importance of posture/ergonomics in association with their condition to facilitate self management of their condition  -GJ     STG 2 Progress Partially Met  -GJ     STG 3 pt to demonstrate STS x 5 in </= 19 seconds with controlled eccentric phase to faciliate ease/safety at home  -GJ     STG 3 Progress Ongoing  -GJ            Long Term Goals    LTG Date to Achieve 03/18/22  -GJ     LTG 1 pt. to be I with advanced HEP to facilitate self management of their condition  -GJ     LTG 1 Progress Ongoing  -GJ     LTG 2 pt to demonstrate STS x 5 in </= 15 seconds with controlled eccentric phase to faciliate ease/safety at home  -GJ     LTG 2 Progress Ongoing  -GJ     LTG 3 pt to be able to ambulate >/= 280 feet during 2 tessa walk test with least restrictive device to  facilitate ease/safety with household mobility  -     LTG 3 Progress Ongoing  -     LTG 4 pt to demonstrate near normal heel to toe gait pattern(absennce of festinating gait pattern)  with least restrictive AD, without cuing, to facilitate ease/safety with household/community mobility  -     LTG 4 Progress Ongoing  -     LTG 5 pt to be able to ascend/descend 2 steps with </= 1 rail to facilitate ease/safety with visiting son's house  -     LTG 5 Progress Ongoing  -     LTG 6 pt to demonstrate no hesitation in gait pattern while traversing transitions in floor surfaces to facilitatae ease/safetty with household/community activities  -     LTG 6 Progress Ongoing  -           User Key  (r) = Recorded By, (t) = Taken By, (c) = Cosigned By    Initials Name Provider Type    Avelino Mitchell, PT Physical Therapist                Therapy Education  Education Details: reinforcd keeping walker closer to her to decrease stress to spine, cuing to increse step length  Given: Symptoms/condition management, Posture/body mechanics, Mobility training  Program: Reinforced  How Provided: Verbal  Provided to: Patient, Caregiver  Level of Understanding: Verbalized              Time Calculation:   Start Time: 1400  Stop Time: 1445  Time Calculation (min): 45 min  Timed Charges  37828 - PT Manual Therapy Minutes: 30  67050 - PT Therapeutic Activity Minutes: 10 (education)  Total Minutes  Timed Charges Total Minutes: 40   Total Minutes: 40  Therapy Charges for Today     Code Description Service Date Service Provider Modifiers Qty    50332686977 HC PT THERAPEUTIC ACT EA 15 MIN 2/7/2022 Avelino Cruz, PT GP 1    21144053972 HC PT MANUAL THERAPY EA 15 MIN 2/7/2022 Avelino Cruz, PT GP 2                    Avelino Cruz, PT  2/7/2022

## 2022-02-10 ENCOUNTER — HOSPITAL ENCOUNTER (OUTPATIENT)
Dept: PHYSICAL THERAPY | Facility: HOSPITAL | Age: 82
Setting detail: THERAPIES SERIES
Discharge: HOME | End: 2022-02-10

## 2022-02-10 DIAGNOSIS — R26.89 IMPAIRED GAIT AND MOBILITY: Primary | ICD-10-CM

## 2022-02-10 DIAGNOSIS — Z74.09 IMPAIRED MOBILITY: ICD-10-CM

## 2022-02-10 PROCEDURE — 97110 THERAPEUTIC EXERCISES: CPT

## 2022-02-10 PROCEDURE — 97530 THERAPEUTIC ACTIVITIES: CPT

## 2022-02-10 NOTE — THERAPY PROGRESS REPORT/RE-CERT
Outpatient Physical Therapy Ortho Re-Assessment  [facility]     Patient Name: Velma Banguar  : 1940  MRN: [id number]  Today's Date: 2/10/2022      Visit Date: 02/10/2022    There is no problem list on file for this patient.       Past Medical History:   Diagnosis Date   • Arthritis         Past Surgical History:   Procedure Laterality Date   • BREAST LUMPECTOMY Left    • KNEE ARTHROPLASTY Left        Visit Dx:     ICD-10-CM ICD-9-CM   1. Impaired gait and mobility  R26.89 781.2   2. Impaired mobility  Z74.09 799.89                             Therapy Education  Education Details: discussed current presentation, lack of progress, POC changes, approach following discharge  Given: HEP, Symptoms/condition management, Posture/body mechanics, Fall prevention and home safety  Program: Reinforced  How Provided: Verbal  Provided to: Patient, Caregiver  Level of Understanding: Verbalized  89198 - PT Self Care/Mgmt Minutes: 5      PT OP Goals     Row Name 02/10/22 1100          PT Short Term Goals    STG Date to Achieve 22  -     STG 1 pt. to be I with initial HEP to facilitate self management of their condition  -     STG 1 Progress Met  -     STG 2 pt. to be educated in/verbalize understanding of the importance of posture/ergonomics in association with their condition to facilitate self management of their condition  -     STG 2 Progress Met  -     STG 3 pt to demonstrate STS x 5 in </= 19 seconds with controlled eccentric phase to faciliate ease/safety at home  -     STG 3 Progress Ongoing  -     STG 3 Progress Comments 28, mild improvement in mechanics  -            Long Term Goals    LTG Date to Achieve 22  -     LTG 1 pt. to be I with advanced HEP to facilitate self management of their condition  -     LTG 1 Progress Ongoing  -     LTG 2 pt to demonstrate STS x 5 in </= 15 seconds with controlled eccentric phase to faciliate ease/safety at home  -     LTG 2 Progress  Ongoing  -     LTG 3 pt to be able to ambulate >/= 280 feet during 2 tessa walk test with least restrictive device to facilitate ease/safety with household mobility  -     LTG 3 Progress Ongoing  -     LTG 3 Progress Comments 122  -     LTG 4 pt to demonstrate near normal heel to toe gait pattern(absennce of festinating gait pattern)  with least restrictive AD, without cuing, to facilitate ease/safety with household/community mobility  -     LTG 4 Progress Ongoing  -     LTG 4 Progress Comments able to maintain good stepping strategy for 5-10 steps with frequent cues required  -     LT 5 pt to be able to ascend/descend 2 steps with </= 1 rail to facilitate ease/safety with visiting son's house  -     LTG 5 Progress Ongoing; Progressing  -     LT 5 Progress Comments requires B HR  -[facility] 6 pt to demonstrate no hesitation in gait pattern while traversing transitions in floor surfaces to facilitatae ease/safetty with household/community activities  -     LTG 6 Progress Ongoing  -[facility] 6 Progress Comments multiple hesitations and festinating gait and poor attention to task  -           User Key  (r) = Recorded By, (t) = Taken By, (c) = Cosigned By    Initials Name Provider Type    Candida Vargas, PT Physical Therapist                 PT Assessment/Plan     Row Name 02/10/22 1400          PT Assessment    Functional Limitations Decreased safety during functional activities; Impaired gait; Impaired locomotion; Limitation in home management; Limitations in community activities; Performance in leisure activities  -     Impairments Balance; Cognition; Endurance; Gait; Impaired flexibility; Impaired muscle length; Impaired muscle power; Muscle strength; Pain; Poor body mechanics; Posture; Range of motion  -     Assessment Comments Velma Bangura has been seen for 19 physical therapy sessions for balance/gait defects. Treatment has included therapeutic exercise, therapeutic activity,  neuro-muscular retraining, manual therapy, gait training and patient education with home exercise program. Progress to physical therapy goals is unsatisfactory. Pt has met 2/3 STG and 0/6 LTG and minimal progression towards all remaining goals in comparison to previous re-assessment. Patient demonstrates lack of progress with 2MWT from 139 feet on initial evaluation to 122 feet. She continues to require frequent/direct cues to reduce BLE shuffling, increase step length/height, to maintain RWx within close proximity, reduce head down posture and to focus on target ahead to improve attention. Patient demonstrates ability to maintain optimal step length for approximately 5-10 steps but continues to require heavy verbal cues and benefits from apply desired number of steps to reach destination. Patient's 5xSTS also shows lack of progress from 22 seconds on initial evaluation to 28 seconds with demonstration of mild carry over with improved ability to control decent. Previous sessions have targeted Ms. Bangura transfer mechanics and gait deficits. Due to lack of progress and functional plateau, Ms. Bangura would benefit from 2-3 additional skilled PT sessions to address lingering functional deficits prior to transitioning to independent program assisted by family.  -DEMETRIUS     Please refer to paper survey for additional self-reported information No  -DEMETRIUS     Rehab Potential Fair  -DEMETRIUS     Patient/caregiver participated in establishment of treatment plan and goals Yes  -DEMETRIUS     Patient would benefit from skilled therapy intervention Yes  -DEMETRIUS            PT Plan    PT Frequency 2x/week  -DEMETRIUS     Predicted Duration of Therapy Intervention (PT) 2 visits  -DEMETRIUS     Planned CPT's? PT RE-EVAL: 30241; PT THER PROC EA 15 MIN: 34604; PT THER ACT EA 15 MIN: 65277; PT MANUAL THERAPY EA 15 MIN: 86899; PT NEUROMUSC RE-EDUCATION EA 15 MIN: 52146; PT GAIT TRAINING EA 15 MIN: 51422; PT SELF CARE/HOME MGMT/TRAIN EA 15: 86932; PT HOT OR COLD PACK TREAT [facility]   -DEMETRIUS     PT Plan Comments address lingering funcitonal deficts and any concerns presented by patient/family. review transfers and approach to chair when attempting to perform stand-sit transfer  -DEMETRIUS           User Key  (r) = Recorded By, (t) = Taken By, (c) = Cosigned By    Initials Name Provider Type    Candida Vargas PT Physical Therapist                   OP Exercises     Row Name 02/10/22 1100 02/10/22 1000          Subjective Comments    Subjective Comments -- no change, doing ok  -DEMETRIUS            Subjective Pain    Subjective Pain Comment -- arrives at 1054 for 1045 appt  -DEMETRIUS            Total Minutes    33459 - PT Therapeutic Exercise Minutes 14  -DEMETRIUS --     57724 - PT Therapeutic Activity Minutes 15  -DEMETRIUS --            Exercise 1    Exercise Name 1 nustep  -DEMETRIUS --     Reps 1 5  -DEMETRIUS --            Exercise 2    Exercise Name 2 HR  -DEMETRIUS --     Reps 2 15  -DEMETRIUS --            Exercise 5    Exercise Name 5 lateral stepping  -DEMETRIUS --     Cueing 5 Verbal; Demo  -DEMETRIUS --     Reps 5 5 laps  -DEMETRIUS --     Time 5 6-8 steps  -DEMETRIUS --            Exercise 10    Exercise Name 10 walking marches  -DEMETRIUS --     Cueing 10 Verbal; Demo  -DEMETRIUS --     Reps 10 8 steps, 4 laps, parallel bars  -DEMETRIUS --     Time 10 cues for high knee, land on heel  -DEMETRIUS --            Exercise 11    Exercise Name 11 assessment  -DEMETRIUS --     Time 11 15 min  -DEMETRIUS --           User Key  (r) = Recorded By, (t) = Taken By, (c) = Cosigned By    Initials Name Provider Type    Candida Vargas PT Physical Therapist                              Outcome Measure Options: 2 Minute Walk Test, 5x Sit to Stand  2 Minute Walk Test  Gait, Assistive Device: rolling walker  Distance Ambulated in 2 Minutes: 122  5 Times Sit to Stand  5 Times Sit to Stand (seconds): 28 seconds  5 Times Sit to Stand Comments: use of BUE, mild loss of eccentric control, better overall         Time Calculation:     Start Time: 1054  Stop Time: 1128  Time Calculation (min): 34 min  Timed  Charges  64659 - PT Therapeutic Exercise Minutes: 14  29778 - PT Therapeutic Activity Minutes: 15  07952 - PT Self Care/Mgmt Minutes: 5  Total Minutes  Timed Charges Total Minutes: 34   Total Minutes: 34     Therapy Charges for Today     Code Description Service Date Service Provider Modifiers Qty    74751258289 HC PT THER PROC EA 15 MIN 2/10/2022 Candida Low, PT GP 1    21361454185 HC PT THERAPEUTIC ACT EA 15 MIN 2/10/2022 Candida Low, PT GP 1          PT G-Codes  Outcome Measure Options: 2 Minute Walk Test, 5x Sit to Stand         Candida Low, PT  2/10/2022

## 2022-02-14 ENCOUNTER — HOSPITAL ENCOUNTER (OUTPATIENT)
Dept: PHYSICAL THERAPY | Facility: HOSPITAL | Age: 82
Setting detail: THERAPIES SERIES
Discharge: HOME | End: 2022-02-14

## 2022-02-14 DIAGNOSIS — R26.89 IMPAIRED GAIT AND MOBILITY: Primary | ICD-10-CM

## 2022-02-14 DIAGNOSIS — Z74.09 IMPAIRED MOBILITY: ICD-10-CM

## 2022-02-14 PROCEDURE — 97110 THERAPEUTIC EXERCISES: CPT | Performed by: PHYSICAL THERAPIST

## 2022-02-14 NOTE — THERAPY TREATMENT NOTE
Outpatient Physical Therapy Ortho Treatment Note  [facility]     Patient Name: Velma Bangura  : 1940  MRN: [id number]  Today's Date: 2022      Visit Date: 2022    Visit Dx:    ICD-10-CM ICD-9-CM   1. Impaired gait and mobility  R26.89 781.2   2. Impaired mobility  Z74.09 799.89       There is no problem list on file for this patient.       Past Medical History:   Diagnosis Date   • Arthritis         Past Surgical History:   Procedure Laterality Date   • BREAST LUMPECTOMY Left    • KNEE ARTHROPLASTY Left                         PT Assessment/Plan     Row Name 22 1205          PT Assessment    Assessment Comments Ms. Bangura returns with her daughter in law.  They report that there is now a new chair yoga class where she lives. I encouraged to participate in this class at least 3 x's per week. We discussed upcoming transition to independent managment and answered questions.  Ms. Bangura continues to demonstrate inconsistent carry over from session to session especialy with stand to sit and gait activities. We continued to work on sit to/from stand transfers.  She has one additional session established prior to likely transition to independent management.  -GJ            PT Plan    PT Plan Comments likely transition to independent program following next session  -           User Key  (r) = Recorded By, (t) = Taken By, (c) = Cosigned By    Initials Name Provider Type    Avelino Mitchell, PT Physical Therapist                   OP Exercises     Row Name 22 1100 22 1046          Subjective Comments    Subjective Comments doingok. Daughter in law reports new chair yoga class where the patient lives  - --            Total Minutes    08096 - PT Therapeutic Exercise Minutes -- 40  -GJ            Exercise 1    Exercise Name 1 nustep  - --     Reps 1 10, min  - --     Additional Comments discussion of upcoming independent management with daughter in law  - --            Exercise 2     Exercise Name 2 HR  -GJ --     Reps 2 15  -GJ --            Exercise 5    Exercise Name 5 lateral stepping  -GJ --     Reps 5 5 laps  -GJ --            Exercise 10    Exercise Name 10 walking marches  -GJ --     Cueing 10 Verbal; Demo  -GJ --     Reps 10 8 steps, 4 laps, parallel bars  -GJ --     Time 10 cues for high knee, land on heel  -GJ --            Exercise 12    Exercise Name 12 forward/retroo gait in parallel bars  -GJ --     Cueing 12 Verbal; Demo  -GJ --     Reps 12 5 laps  -GJ --     Time 12 BUE support, big steps  -GJ --            Exercise 13    Exercise Name 13 sit to stand, walk 10 ft to chair, stand to sit  -GJ --     Reps 13 5 laps  -GJ --     Time 13 cues for techniuqe, not to dive for chair  -GJ --            Exercise 17    Exercise Name 17 touch/tap 8 inch step, alteranting, 1 UE  -GJ --     Cueing 17 Verbal; Demo  -GJ --     Sets 17 2  -GJ --     Reps 17 15  -GJ --           User Key  (r) = Recorded By, (t) = Taken By, (c) = Cosigned By    Initials Name Provider Type    GJ Avelino Cruz, PT Physical Therapist                                 Therapy Education  Education Details: discussed with pt and daughter in law upcoming transition to independent management for several months, then likely return, discussed return to [place], encouraged participation with chair yoga classes  Given: HEP, Symptoms/condition management, Pain management, Posture/body mechanics, Mobility training  Program: Reinforced  How Provided: Verbal  Provided to: Patient  Level of Understanding: Verbalized              Time Calculation:   Start Time: 1046  Stop Time: 1129  Time Calculation (min): 43 min  Timed Charges  98769 - PT Therapeutic Exercise Minutes: 40  Total Minutes  Timed Charges Total Minutes: 40   Total Minutes: 40  Therapy Charges for Today     Code Description Service Date Service Provider Modifiers Qty    [id number]  PT THER PROC EA 15 MIN 2/14/2022 Avelino Cruz, PT GP 3                    Avelino BENDER  Anthony, PT  2/14/2022

## 2022-02-17 ENCOUNTER — HOSPITAL ENCOUNTER (OUTPATIENT)
Dept: PHYSICAL THERAPY | Facility: HOSPITAL | Age: 82
Setting detail: THERAPIES SERIES
Discharge: HOME | End: 2022-02-17

## 2022-02-17 DIAGNOSIS — Z74.09 IMPAIRED MOBILITY: ICD-10-CM

## 2022-02-17 DIAGNOSIS — R26.89 IMPAIRED GAIT AND MOBILITY: Primary | ICD-10-CM

## 2022-02-17 PROCEDURE — 97110 THERAPEUTIC EXERCISES: CPT

## 2022-02-17 NOTE — THERAPY DISCHARGE NOTE
Outpatient Physical Therapy Ortho Treatment Note/Discharge Summary  [facility]     Patient Name: Velma Bangura  : 1940  MRN: [id number]  Today's Date: 2022      Visit Date: 2022    Visit Dx:    ICD-10-CM ICD-9-CM   1. Impaired gait and mobility  R26.89 781.2   2. Impaired mobility  Z74.09 799.89       There is no problem list on file for this patient.       Past Medical History:   Diagnosis Date   • Arthritis         Past Surgical History:   Procedure Laterality Date   • BREAST LUMPECTOMY Left    • KNEE ARTHROPLASTY Left                         PT Assessment/Plan     Row Name 22 1100          PT Assessment    Assessment Comments Velma Bangura has been seen for 21 physical therapy sessions for balance/gait defects. Treatment has included therapeutic exercise, therapeutic activity, neuro-muscular retraining, manual therapy, gait training and patient education with home exercise program. Progress to physical therapy goals is unsatisfactory. Pt has met 2/3 STG and 1/6 LTG and minimal progression towards all remaining goals in comparison to previous re-assessment. Patient demonstrates lack of progress with 2MWT from 139 feet on initial evaluation to 110.8 feet. She continues to require frequent/direct cues to reduce BLE shuffling, increase step length/height, to maintain RWx within close proximity, reduce head down posture and to focus on target ahead to improve attention. Patient demonstrates ability to maintain optimal step length for approximately 5-10 steps but continues to require heavy verbal cues and benefits from apply desired number of steps to reach destination. Patient's 5xSTS also shows lack of progress from 22 seconds on initial evaluation to 23.63 seconds with demonstration of mild carry over with improved ability to control decent. Time spent reviewing stepping strategy and sit-stand transfer mechanics with heavy discussing in detail with patient/family. Due to lack of progress  and functional plateau, she was discharged to an independent HEP and provided patient education to self-manage condition.  -DEMETRIUS            PT Plan    PT Plan Comments discharged  -DEMETRIUS           User Key  (r) = Recorded By, (t) = Taken By, (c) = Cosigned By    Initials Name Provider Type    Candida Vargas, PT Physical Therapist                     OP Exercises     Row Name 02/17/22 1100             Subjective Comments    Subjective Comments ready for discharge  -DEMETRIUS              Total Minutes    70013 - PT Therapeutic Exercise Minutes 38  -DEMETRIUS              Exercise 1    Exercise Name 1 nustep  -DEMETRIUS      Reps 1 6, min  -DEMETRIUS              Exercise 2    Exercise Name 2 HR  -DEMETRIUS      Reps 2 15  -DEMETRIUS              Exercise 5    Exercise Name 5 lateral stepping  -DEMETRIUS      Cueing 5 Verbal; Demo  -DEMETRIUS      Reps 5 5 laps  -DEMETRIUS      Time 5 6-8 steps  -DEMETRIUS              Exercise 10    Exercise Name 10 walking marches  -DEMETRIUS      Cueing 10 Verbal; Demo  -DEMETRIUS      Reps 10 8 steps, 4 laps, parallel bars  -DEMETRIUS      Time 10 cues for high knee, land on heel  -DEMETRIUS              Exercise 11    Exercise Name 11 assessment  -DEMETRIUS      Time 11 15 min  -DEMETRIUS              Exercise 12    Exercise Name 12 forward/retroo gait in parallel bars  -DEMETRIUS      Cueing 12 Verbal; Demo  -DEMETRIUS      Reps 12 5 laps  -DEMETRIUS      Time 12 BUE support, big steps  -DEMETRIUS              Exercise 17    Exercise Name 17 touch/tap 8 inch step, alteranting, 1 UE  -DEMETRIUS      Cueing 17 Verbal; Demo  -DEMETRIUS      Sets 17 2  -DEMETRIUS      Reps 17 15  -DEMETRIUS            User Key  (r) = Recorded By, (t) = Taken By, (c) = Cosigned By    Initials Name Provider Type    Candida Vargas, PT Physical Therapist                                PT OP Goals     Row Name 02/17/22 1000          PT Short Term Goals    STG Date to Achieve 01/21/22  -DEMETRIUS     STG 1 pt. to be I with initial HEP to facilitate self management of their condition  -DEMETRIUS     STG 1 Progress Met  -DEMETRIUS     STG 2 pt. to be educated in/verbalize  understanding of the importance of posture/ergonomics in association with their condition to facilitate self management of their condition  -     STG 2 Progress Met  -     STG 3 pt to demonstrate STS x 5 in </= 19 seconds with controlled eccentric phase to faciliate ease/safety at home  -     STG 3 Progress Not Met  -     STG 3 Progress Comments 23.62  -DEMETRIUS            Long Term Goals    LTG Date to Achieve 03/18/22  -     LTG 1 pt. to be I with advanced HEP to facilitate self management of their condition  -     LTG 1 Progress Met  -     LTG 1 Progress Comments with family assistance  -     LTG 2 pt to demonstrate STS x 5 in </= 15 seconds with controlled eccentric phase to faciliate ease/safety at home  -     LTG 2 Progress Not Met  -     LTG 2 Progress Comments 23.62  -     LTG 3 pt to be able to ambulate >/= 280 feet during 2 tessa walk test with least restrictive device to facilitate ease/safety with household mobility  -     LTG 3 Progress Not Met  -     LTG 3 Progress Comments 110.8  -     LTG 4 pt to demonstrate near normal heel to toe gait pattern(absennce of festinating gait pattern)  with least restrictive AD, without cuing, to facilitate ease/safety with household/community mobility  -     LTG 4 Progress Not Met  -     LTG 4 Progress Comments able to maintain good stepping strategy for 5-10 steps with frequent cues required  -     LTG 5 pt to be able to ascend/descend 2 steps with </= 1 rail to facilitate ease/safety with visiting son's house  -     LTG 5 Progress Not Met  -     LTG 5 Progress Comments requires B HR  -     LTG 6 pt to demonstrate no hesitation in gait pattern while traversing transitions in floor surfaces to facilitatae ease/safetty with household/community activities  -     LTG 6 Progress Not Met  -     LTG 6 Progress Comments multiple hesitations and festinating gait and poor attention to task  -           User Key  (r) = Recorded By, (t) =  Taken By, (c) = Cosigned By    Initials Name Provider Type    Candida Vargas, PT Physical Therapist                     Outcome Measure Options: 2 Minute Walk Test, 5x Sit to Stand  2 Minute Walk Test  Gait, Assistive Device: rolling walker  Distance Ambulated in 2 Minutes: 110.8  5 Times Sit to Stand  5 Times Sit to Stand (seconds): 23.62 seconds  5 Times Sit to Stand Comments: use of BUE, mild loss of eccentric control, better overall         Time Calculation:   Start Time: 1045  Stop Time: 1123  Time Calculation (min): 38 min  Timed Charges  21356 - PT Therapeutic Exercise Minutes: 38  Total Minutes  Timed Charges Total Minutes: 38   Total Minutes: 38  Therapy Charges for Today     Code Description Service Date Service Provider Modifiers Qty    71204381509 HC PT THER PROC EA 15 MIN 2/17/2022 Candida Low, PT GP 3          PT G-Codes  Outcome Measure Options: 2 Minute Walk Test, 5x Sit to Stand     OP PT Discharge Summary  Date of Discharge: 02/17/22  Reason for Discharge: Lack of progress, Maximum functional potential achieved  Outcomes Achieved: Patient able to partially acheive established goals  Discharge Destination: Home with home program      Candida Low, PT  2/17/2022

## 2022-03-02 ENCOUNTER — HOSPITAL ENCOUNTER (OUTPATIENT)
Dept: INFUSION THERAPY | Facility: HOSPITAL | Age: 82
Discharge: HOME | End: 2022-03-02
Admitting: PSYCHIATRY & NEUROLOGY

## 2022-03-02 DIAGNOSIS — G60.9 PERIPHERAL NEUROPATHY, IDIOPATHIC: ICD-10-CM

## 2022-03-02 DIAGNOSIS — R26.89 ABNORMALITY OF GAIT DUE TO IMPAIRMENT OF BALANCE: ICD-10-CM

## 2022-03-02 PROCEDURE — 95886 MUSC TEST DONE W/N TEST COMP: CPT | Performed by: PSYCHIATRY & NEUROLOGY

## 2022-03-02 PROCEDURE — 95909 NRV CNDJ TST 5-6 STUDIES: CPT | Performed by: PSYCHIATRY & NEUROLOGY

## 2022-03-02 PROCEDURE — 95909 NRV CNDJ TST 5-6 STUDIES: CPT

## 2022-03-02 PROCEDURE — 95886 MUSC TEST DONE W/N TEST COMP: CPT

## 2022-03-02 NOTE — PROCEDURES
EMG and Nerve Conduction Studies    I.      Instrument used: Neuromax 1002  II.     Please see data sheets for tabular summary of NCS and details on methods, temperatures and lab standards.   III.    EMG muscles tested for upper extremity studies include the deltoid, biceps, triceps, pronator teres, extensor digitorum communis, first dorsal interosseous and abductor pollicis brevis.    IV.   EMG muscles tested for lower extremity studies include the vastus lateralis, tibialis anterior, peroneus longus, medial gastrocnemius and extensor digitorum brevis.    V.    Additional muscles tested as needed.  Paraspinal muscles tested as needed.   VI.   Please see data sheets for tabular summary of EMG findings.   VII. The complete report includes the data sheets.      Indication: Gait ataxia  History: 81-year-old white female with gait ataxia.  Has loss of position sensation in the toes apparently.    The study was technically difficult due to patient obesity and the patient jerk with each stimulus of the nerve conduction study creating motor artifacts    Ht: 165.1 cm  Wt: 95.3 kg  HbA1C: No results found for: HGBA1C  TSH:   Lab Results   Component Value Date    TSH 1.543 03/13/2020       Technical summary:  Nerve conduction studies were obtained in the left leg with 1 comparison on the right.  The feet were cold prior to study.  They were warmed but the temperature still dropped below 32 centigrade.  Temperature correction was used if it were indicated.  Needle examination was obtained on selected muscles in both legs.    Results:  1.  Probably normal left sural sensory distal latency and amplitude.  2.  Probably normal superficial peroneal sensory distal latencies with a normal amplitude on the left and the low amplitude on the right at 3.3 µV.  3.  Normal left peroneal motor conduction velocities, distal latency and amplitudes.  4.  Normal left tibial motor distal latency from 80 mm (suboptimal stimulus at 120 mm due to  obesity) slow conduction velocity of 36.3 m/s and normal amplitudes.  5.  Needle examination of selected muscles in both legs showed normal insertional activities throughout.  There was a mild increased number of large motor units in the extensor digitorum brevis muscles bilaterally with some mild recruitment abnormalities.  All other muscles tested showed normal motor units and recruitment.  Lumbar paraspinals at L5 showed no abnormality on either side.    Impression:  Technically difficult but mildly abnormal study with one abnormal nerve conduction in each leg which can be seen in peripheral neuropathy although very mild.  There were some chronic needle exam changes in the extensor digitorum brevis muscles which are interpreted likely to be due to the peripheral neuropathy and less likely to be due to a lumbosacral radiculopathy since no other muscles were involved in paraspinals showed no abnormalities.  Clinical correlation is suggested.    Ajay Kramer M.D.              Dictated utilizing Dragon dictation.

## 2022-12-17 ENCOUNTER — APPOINTMENT (OUTPATIENT)
Dept: GENERAL RADIOLOGY | Facility: HOSPITAL | Age: 82
DRG: 481 | End: 2022-12-17
Payer: MEDICARE

## 2022-12-17 ENCOUNTER — HOSPITAL ENCOUNTER (INPATIENT)
Facility: HOSPITAL | Age: 82
LOS: 3 days | Discharge: NURSING, REHAB OR LONG-TERM CARE | DRG: 481 | End: 2022-12-20
Attending: EMERGENCY MEDICINE | Admitting: HOSPITALIST
Payer: MEDICARE

## 2022-12-17 DIAGNOSIS — S80.02XA CONTUSION OF LEFT KNEE, INITIAL ENCOUNTER: ICD-10-CM

## 2022-12-17 DIAGNOSIS — S72.002A CLOSED FRACTURE OF LEFT HIP, INITIAL ENCOUNTER: Primary | ICD-10-CM

## 2022-12-17 LAB
ABO GROUP BLD: NORMAL
ALBUMIN SERPL-MCNC: 3.8 G/DL (ref 3.5–5.2)
ALBUMIN/GLOB SERPL: 1.3 G/DL
ALP SERPL-CCNC: 112 U/L (ref 39–117)
ALT SERPL W P-5'-P-CCNC: 30 U/L (ref 1–33)
ANION GAP SERPL CALCULATED.3IONS-SCNC: 15 MMOL/L (ref 5–15)
APTT PPP: 25.8 SECONDS (ref 22.7–35.4)
AST SERPL-CCNC: 30 U/L (ref 1–32)
BASOPHILS # BLD AUTO: 0.05 10*3/MM3 (ref 0–0.2)
BASOPHILS NFR BLD AUTO: 0.4 % (ref 0–1.5)
BILIRUB SERPL-MCNC: 0.3 MG/DL (ref 0–1.2)
BLD GP AB SCN SERPL QL: NEGATIVE
BUN SERPL-MCNC: 15 MG/DL (ref 8–23)
BUN/CREAT SERPL: 16.7 (ref 7–25)
CALCIUM SPEC-SCNC: 9.2 MG/DL (ref 8.6–10.5)
CHLORIDE SERPL-SCNC: 97 MMOL/L (ref 98–107)
CO2 SERPL-SCNC: 25 MMOL/L (ref 22–29)
CREAT SERPL-MCNC: 0.9 MG/DL (ref 0.57–1)
DEPRECATED RDW RBC AUTO: 40.2 FL (ref 37–54)
EGFRCR SERPLBLD CKD-EPI 2021: 63.2 ML/MIN/1.73
EOSINOPHIL # BLD AUTO: 0.07 10*3/MM3 (ref 0–0.4)
EOSINOPHIL NFR BLD AUTO: 0.5 % (ref 0.3–6.2)
ERYTHROCYTE [DISTWIDTH] IN BLOOD BY AUTOMATED COUNT: 11.9 % (ref 12.3–15.4)
GLOBULIN UR ELPH-MCNC: 3 GM/DL
GLUCOSE BLDC GLUCOMTR-MCNC: 241 MG/DL (ref 70–130)
GLUCOSE SERPL-MCNC: 248 MG/DL (ref 65–99)
HCT VFR BLD AUTO: 45.7 % (ref 34–46.6)
HGB BLD-MCNC: 15.6 G/DL (ref 12–15.9)
IMM GRANULOCYTES # BLD AUTO: 0.06 10*3/MM3 (ref 0–0.05)
IMM GRANULOCYTES NFR BLD AUTO: 0.4 % (ref 0–0.5)
INR PPP: 1 (ref 0.9–1.1)
LYMPHOCYTES # BLD AUTO: 1.54 10*3/MM3 (ref 0.7–3.1)
LYMPHOCYTES NFR BLD AUTO: 11.5 % (ref 19.6–45.3)
MCH RBC QN AUTO: 31.3 PG (ref 26.6–33)
MCHC RBC AUTO-ENTMCNC: 34.1 G/DL (ref 31.5–35.7)
MCV RBC AUTO: 91.6 FL (ref 79–97)
MONOCYTES # BLD AUTO: 0.84 10*3/MM3 (ref 0.1–0.9)
MONOCYTES NFR BLD AUTO: 6.3 % (ref 5–12)
NEUTROPHILS NFR BLD AUTO: 10.88 10*3/MM3 (ref 1.7–7)
NEUTROPHILS NFR BLD AUTO: 80.9 % (ref 42.7–76)
NRBC BLD AUTO-RTO: 0 /100 WBC (ref 0–0.2)
PLATELET # BLD AUTO: 279 10*3/MM3 (ref 140–450)
PMV BLD AUTO: 10.4 FL (ref 6–12)
POTASSIUM SERPL-SCNC: 4.6 MMOL/L (ref 3.5–5.2)
PROT SERPL-MCNC: 6.8 G/DL (ref 6–8.5)
PROTHROMBIN TIME: 13.3 SECONDS (ref 11.7–14.2)
QT INTERVAL: 381 MS
RBC # BLD AUTO: 4.99 10*6/MM3 (ref 3.77–5.28)
RH BLD: NEGATIVE
SODIUM SERPL-SCNC: 137 MMOL/L (ref 136–145)
T&S EXPIRATION DATE: NORMAL
WBC NRBC COR # BLD: 13.44 10*3/MM3 (ref 3.4–10.8)

## 2022-12-17 PROCEDURE — 82962 GLUCOSE BLOOD TEST: CPT

## 2022-12-17 PROCEDURE — 80053 COMPREHEN METABOLIC PANEL: CPT | Performed by: EMERGENCY MEDICINE

## 2022-12-17 PROCEDURE — 73502 X-RAY EXAM HIP UNI 2-3 VIEWS: CPT

## 2022-12-17 PROCEDURE — 85610 PROTHROMBIN TIME: CPT | Performed by: EMERGENCY MEDICINE

## 2022-12-17 PROCEDURE — 85730 THROMBOPLASTIN TIME PARTIAL: CPT | Performed by: EMERGENCY MEDICINE

## 2022-12-17 PROCEDURE — 99285 EMERGENCY DEPT VISIT HI MDM: CPT

## 2022-12-17 PROCEDURE — 25010000002 ONDANSETRON PER 1 MG: Performed by: EMERGENCY MEDICINE

## 2022-12-17 PROCEDURE — 85025 COMPLETE CBC W/AUTO DIFF WBC: CPT | Performed by: EMERGENCY MEDICINE

## 2022-12-17 PROCEDURE — 93005 ELECTROCARDIOGRAM TRACING: CPT | Performed by: EMERGENCY MEDICINE

## 2022-12-17 PROCEDURE — 86900 BLOOD TYPING SEROLOGIC ABO: CPT | Performed by: EMERGENCY MEDICINE

## 2022-12-17 PROCEDURE — 73562 X-RAY EXAM OF KNEE 3: CPT

## 2022-12-17 PROCEDURE — 25010000002 MORPHINE PER 10 MG: Performed by: HOSPITALIST

## 2022-12-17 PROCEDURE — 25010000002 MORPHINE PER 10 MG: Performed by: EMERGENCY MEDICINE

## 2022-12-17 PROCEDURE — 86901 BLOOD TYPING SEROLOGIC RH(D): CPT | Performed by: EMERGENCY MEDICINE

## 2022-12-17 PROCEDURE — 93010 ELECTROCARDIOGRAM REPORT: CPT | Performed by: INTERNAL MEDICINE

## 2022-12-17 PROCEDURE — 71045 X-RAY EXAM CHEST 1 VIEW: CPT

## 2022-12-17 PROCEDURE — 86850 RBC ANTIBODY SCREEN: CPT | Performed by: EMERGENCY MEDICINE

## 2022-12-17 RX ORDER — METFORMIN HYDROCHLORIDE 500 MG/1
500 TABLET, EXTENDED RELEASE ORAL
COMMUNITY
End: 2022-12-20 | Stop reason: HOSPADM

## 2022-12-17 RX ORDER — BENZONATATE 200 MG/1
200 CAPSULE ORAL 3 TIMES DAILY PRN
COMMUNITY
End: 2022-12-20 | Stop reason: HOSPADM

## 2022-12-17 RX ORDER — ONDANSETRON 2 MG/ML
4 INJECTION INTRAMUSCULAR; INTRAVENOUS ONCE
Status: COMPLETED | OUTPATIENT
Start: 2022-12-17 | End: 2022-12-17

## 2022-12-17 RX ORDER — SERTRALINE HYDROCHLORIDE 25 MG/1
25 TABLET, FILM COATED ORAL DAILY
COMMUNITY

## 2022-12-17 RX ORDER — BISACODYL 10 MG
10 SUPPOSITORY, RECTAL RECTAL DAILY PRN
COMMUNITY
End: 2022-12-20 | Stop reason: HOSPADM

## 2022-12-17 RX ORDER — INSULIN LISPRO 100 [IU]/ML
0-7 INJECTION, SOLUTION INTRAVENOUS; SUBCUTANEOUS
Status: DISCONTINUED | OUTPATIENT
Start: 2022-12-18 | End: 2022-12-18

## 2022-12-17 RX ORDER — SENNA PLUS 8.6 MG/1
2 TABLET ORAL 2 TIMES DAILY PRN
COMMUNITY

## 2022-12-17 RX ORDER — MORPHINE SULFATE 2 MG/ML
2 INJECTION, SOLUTION INTRAMUSCULAR; INTRAVENOUS EVERY 4 HOURS PRN
Status: DISCONTINUED | OUTPATIENT
Start: 2022-12-17 | End: 2022-12-18

## 2022-12-17 RX ORDER — SODIUM CHLORIDE 0.9 % (FLUSH) 0.9 %
10 SYRINGE (ML) INJECTION AS NEEDED
Status: DISCONTINUED | OUTPATIENT
Start: 2022-12-17 | End: 2022-12-20 | Stop reason: HOSPADM

## 2022-12-17 RX ORDER — DEXTROSE MONOHYDRATE 25 G/50ML
25 INJECTION, SOLUTION INTRAVENOUS
Status: DISCONTINUED | OUTPATIENT
Start: 2022-12-17 | End: 2022-12-18

## 2022-12-17 RX ORDER — MULTIPLE VITAMINS W/ MINERALS TAB 9MG-400MCG
1 TAB ORAL DAILY
COMMUNITY

## 2022-12-17 RX ORDER — NICOTINE POLACRILEX 4 MG
15 LOZENGE BUCCAL
Status: DISCONTINUED | OUTPATIENT
Start: 2022-12-17 | End: 2022-12-18

## 2022-12-17 RX ORDER — MORPHINE SULFATE 2 MG/ML
2 INJECTION, SOLUTION INTRAMUSCULAR; INTRAVENOUS ONCE
Status: COMPLETED | OUTPATIENT
Start: 2022-12-17 | End: 2022-12-17

## 2022-12-17 RX ADMIN — ONDANSETRON 4 MG: 2 INJECTION INTRAMUSCULAR; INTRAVENOUS at 17:34

## 2022-12-17 RX ADMIN — MORPHINE SULFATE 2 MG: 2 INJECTION, SOLUTION INTRAMUSCULAR; INTRAVENOUS at 17:34

## 2022-12-17 RX ADMIN — MORPHINE SULFATE 2 MG: 2 INJECTION, SOLUTION INTRAMUSCULAR; INTRAVENOUS at 23:49

## 2022-12-17 RX ADMIN — MORPHINE SULFATE 2 MG: 2 INJECTION, SOLUTION INTRAMUSCULAR; INTRAVENOUS at 20:02

## 2022-12-17 NOTE — ED PROVIDER NOTES
EMERGENCY DEPARTMENT ENCOUNTER    Room Number:  48/48  Date seen:  12/17/2022  PCP: Idalia Valiente DO  Historian: Patient and family at bedside   relevant information and history provided by sources other than the patient will be included in the context section.  Review of pertinent past medical records may also be included in the context section rather than MDM to avoid unnecessary redundancy.    HPI:  Chief Complaint: Left knee pain  A complete HPI/ROS/PMH/PSH/SH/FH are unobtainable due to: Dementia  Context: Velma Bangura is a 82 y.o. female who presents to the ED c/o severe left knee pain that occurred after a fall today at her residence at assisted living.  Patient reportedly did not hit her head, and reports no other injury or pain from the fall.  She was in her usual state of health prior to this, and she is not able to provide a lot of useful information historically.  The family was not there.        REVIEW OF SYSTEMS  Review of Systems   Limited due to dementia      PAST MEDICAL HISTORY  Active Ambulatory Problems     Diagnosis Date Noted   • No Active Ambulatory Problems     Resolved Ambulatory Problems     Diagnosis Date Noted   • No Resolved Ambulatory Problems     Past Medical History:   Diagnosis Date   • Arthritis    • Dementia (HCC)    • Depression    • Diabetes mellitus (HCC)    • Disease of thyroid gland    • Hypotension          PAST SURGICAL HISTORY  Past Surgical History:   Procedure Laterality Date   • BREAST LUMPECTOMY Left    • KNEE ARTHROPLASTY Left          FAMILY HISTORY  Family History   Problem Relation Age of Onset   • Arthritis Mother    • Dementia Mother    • Migraines Mother    • Cancer Father    • Heart disease Father          SOCIAL HISTORY  Social History     Socioeconomic History   • Marital status:    Tobacco Use   • Smoking status: Never   • Smokeless tobacco: Never   Vaping Use   • Vaping Use: Never used   Substance and Sexual Activity   • Alcohol use:  Never   • Drug use: Never   • Sexual activity: Defer         ALLERGIES  Patient has no known allergies.          PHYSICAL EXAM  ED Triage Vitals [12/17/22 1442]   Temp Heart Rate Resp BP SpO2   97.8 °F (36.6 °C) 90 20 136/85 96 %      Temp src Heart Rate Source Patient Position BP Location FiO2 (%)   Oral -- -- -- --       Physical Exam      GENERAL: Awake and alert, mild distress  HENT: nares patent, NCAT  EYES: no scleral icterus, PERRL, EOMI  CV: regular rhythm, normal rate, distal pulses symmetric and palpable  RESPIRATORY: normal effort  ABDOMEN: soft, nondistended nontender with normal bowel sound  MUSCULOSKELETAL: no deformity.  No obvious deformity, but the leg is held in flexion due to discomfort.  There is tenderness palpation of the hip and knee but no obvious deformity and significant pain with range of motion especially at the hip.  There is no instability, but the exam is limited and is neurovascular intact distally  NEURO: alert, moves all extremities, follows commands  PSYCH:  calm, cooperative  SKIN: warm, dry with no rash    Vital signs and nursing notes reviewed.          LAB RESULTS  Recent Results (from the past 24 hour(s))   Comprehensive Metabolic Panel    Collection Time: 12/17/22  5:23 PM    Specimen: Blood   Result Value Ref Range    Glucose 248 (H) 65 - 99 mg/dL    BUN 15 8 - 23 mg/dL    Creatinine 0.90 0.57 - 1.00 mg/dL    Sodium 137 136 - 145 mmol/L    Potassium 4.6 3.5 - 5.2 mmol/L    Chloride 97 (L) 98 - 107 mmol/L    CO2 25.0 22.0 - 29.0 mmol/L    Calcium 9.2 8.6 - 10.5 mg/dL    Total Protein 6.8 6.0 - 8.5 g/dL    Albumin 3.80 3.50 - 5.20 g/dL    ALT (SGPT) 30 1 - 33 U/L    AST (SGOT) 30 1 - 32 U/L    Alkaline Phosphatase 112 39 - 117 U/L    Total Bilirubin 0.3 0.0 - 1.2 mg/dL    Globulin 3.0 gm/dL    A/G Ratio 1.3 g/dL    BUN/Creatinine Ratio 16.7 7.0 - 25.0    Anion Gap 15.0 5.0 - 15.0 mmol/L    eGFR 63.2 >60.0 mL/min/1.73   Protime-INR    Collection Time: 12/17/22  5:23 PM     Specimen: Blood   Result Value Ref Range    Protime 13.3 11.7 - 14.2 Seconds    INR 1.00 0.90 - 1.10   aPTT    Collection Time: 12/17/22  5:23 PM    Specimen: Blood   Result Value Ref Range    PTT 25.8 22.7 - 35.4 seconds   Type & Screen    Collection Time: 12/17/22  5:23 PM    Specimen: Blood   Result Value Ref Range    ABO Type O     RH type Negative     Antibody Screen Negative     T&S Expiration Date 12/20/2022 11:59:59 PM    CBC Auto Differential    Collection Time: 12/17/22  5:23 PM    Specimen: Blood   Result Value Ref Range    WBC 13.44 (H) 3.40 - 10.80 10*3/mm3    RBC 4.99 3.77 - 5.28 10*6/mm3    Hemoglobin 15.6 12.0 - 15.9 g/dL    Hematocrit 45.7 34.0 - 46.6 %    MCV 91.6 79.0 - 97.0 fL    MCH 31.3 26.6 - 33.0 pg    MCHC 34.1 31.5 - 35.7 g/dL    RDW 11.9 (L) 12.3 - 15.4 %    RDW-SD 40.2 37.0 - 54.0 fl    MPV 10.4 6.0 - 12.0 fL    Platelets 279 140 - 450 10*3/mm3    Neutrophil % 80.9 (H) 42.7 - 76.0 %    Lymphocyte % 11.5 (L) 19.6 - 45.3 %    Monocyte % 6.3 5.0 - 12.0 %    Eosinophil % 0.5 0.3 - 6.2 %    Basophil % 0.4 0.0 - 1.5 %    Immature Grans % 0.4 0.0 - 0.5 %    Neutrophils, Absolute 10.88 (H) 1.70 - 7.00 10*3/mm3    Lymphocytes, Absolute 1.54 0.70 - 3.10 10*3/mm3    Monocytes, Absolute 0.84 0.10 - 0.90 10*3/mm3    Eosinophils, Absolute 0.07 0.00 - 0.40 10*3/mm3    Basophils, Absolute 0.05 0.00 - 0.20 10*3/mm3    Immature Grans, Absolute 0.06 (H) 0.00 - 0.05 10*3/mm3    nRBC 0.0 0.0 - 0.2 /100 WBC   ECG 12 Lead Other; pre op    Collection Time: 12/17/22  5:25 PM   Result Value Ref Range    QT Interval 381 ms       Ordered the above labs and reviewed the results.        RADIOLOGY  XR Knee 3 View Left    Result Date: 12/17/2022  Emergency 3 views of left knee on 12/17/2022  CLINICAL HISTORY: Patient fell on left knee and has left knee pain  FINDINGS: 3 views including AP and lateral views and the entire left knee and sunrise view of the patella are submitted for interpretation. Patient has a total left  knee prosthesis in place with good alignment of the femoral and tibial components to the left knee prosthesis. There has been previous posterior patellar resurfacing. I see no convincing acute fracture or malalignment in the bones of the left knee. On the crosstable lateral view no discernible knee joint effusion is seen.  This report was finalized on 12/17/2022 3:36 PM by Dr. Deion Allen M.D.      XR Hip With or Without Pelvis 2 - 3 View Left, XR Chest 1 View    Result Date: 12/17/2022  EMERGENCY PORTABLE VIEW OF THE CHEST AND SINGLE VIEW OF THE PELVIS AND TWO VIEWS OF THE LEFT HIP ON 12/17/2022   CLINICAL HISTORY: Patient fell, left hip pain.  CHEST: There are multiple surgical clips in the right axilla from previous right axillary dissection. The cardiomediastinal silhouette and pulmonary vasculature are within normal limits. The lungs are clear. The costophrenic angles are sharp.      No active disease is seen in the chest. There are surgical clips in the right axilla from previous right axillary dissection.  PELVIS AND LEFT HIP: A single AP view of the pelvis and 2 views of the left hip are submitted for interpretation. There is an extensive comminuted fracture of the intertrochanteric region of the left hip with the fracture plane extending through the base of the left lesser trochanter extending distally into the medial aspect of the subtrochanteric proximal left femur and there is a fracture through the base of the left greater trochanter. There is displacement of the greater trochanteric fracture fragment and there is varus angulation of the fracture site. An orthopedic surgical consultation is warranted.  The results were communicated to Dave Vergara in the ER by telephone on 12/17/2022 at 5:50 PM.        Ordered the above noted radiological studies. Reviewed by me in PACS.            PROCEDURES  Procedures              MEDICATIONS GIVEN IN ER  Medications   sodium chloride 0.9 % flush 10 mL (has no  administration in time range)   morphine injection 2 mg (2 mg Intravenous Given 12/17/22 1734)   ondansetron (ZOFRAN) injection 4 mg (4 mg Intravenous Given 12/17/22 1734)                   MEDICAL DECISION MAKING, PROGRESS, and CONSULTS    All labs have been independently reviewed by me.    All radiology studies have been independently reviewed by me and discussed with radiologist dictating the report.    EKG's are independently viewed and interpreted by me.      The discussion below represents my analysis of pertinent findings related to patient's current condition, review of past medical history and available medical records, differential diagnosis, and discussion with consultants regarding this encounter.          ED Course as of 12/17/22 1914   Sat Dec 17, 2022   1912 Mild leukocytosis, chemistry shows a glucose of 250 but no acute kidney injury or LFT elevation [DP]   1913 Coags normal [DP]   1913 Plain film of left knee shows no acute fracture and stabilization of the hardware.  The left hip film shows a severely comminuted left intertrochanteric hip fracture [DP]   1913 CT scan of the head was not required in this patient as there is no history of hitting her head and her mental status appears to be at baseline. [DP]   1913 EKG at 1725  Sinus rhythm in the 80s  Normal [place] and QT  No acute ST segment changes to suggest ischemia, there is nothing available for comparison at this time. [DP]   1913 I spoke with Dr. Acharya [facility].  She did receive 1 dose of morphine and Zofran for pain [DP]   1914 We agree that Dr. Acharya will consult orthopedic surgery as it is unlikely that she will have any surgical intervention tonight but will likely need ORIF during the admission [DP]      ED Course User Index  [DP] Dave Vergara MD              All appropriate hygiene and PPE requirements were satisfied with this patient encounter    FINAL DIAGNOSES  Final diagnoses:   Contusion of  left knee, initial encounter   Closed fracture of left hip, initial encounter ([facility])         DISPOSITION  Admit            Latest Documented Vital Signs:  As of 19:14 EST  BP- 123/64 HR- 87 Temp- 97.8 °F (36.6 °C) (Oral) O2 sat- 96%        --    Please note that portions of this were completed with a voice recognition program.       Note Disclaimer: At [facility], we believe that sharing information builds trust and better relationships. You are receiving this note because you are receiving care at [facility] or recently visited. It is possible you will see health information before a provider has talked with you about it. This kind of information can be easy to misunderstand. To help you fully understand what it      Dave Vergara MD  12/17/22 1914

## 2022-12-17 NOTE — DISCHARGE INSTRUCTIONS
Ice and elevation of the leg.  Knee brace as needed for support.  Follow-up with your orthopedic doctor sometime next week

## 2022-12-17 NOTE — ED NOTES
Pt via [facility] from [facility] with c/o L knee pain d/t  unwitnessed fall. Pt denies hitting head, staff states pt wasn't on the floor long.     Hx of dementia, at baseline     All triage performed with this RN wearing appropriate PPE.  Pt placed in mask upon arrival to ED.

## 2022-12-18 ENCOUNTER — APPOINTMENT (OUTPATIENT)
Dept: GENERAL RADIOLOGY | Facility: HOSPITAL | Age: 82
DRG: 481 | End: 2022-12-18
Payer: MEDICARE

## 2022-12-18 ENCOUNTER — ANESTHESIA (OUTPATIENT)
Dept: PERIOP | Facility: HOSPITAL | Age: 82
DRG: 481 | End: 2022-12-18
Payer: MEDICARE

## 2022-12-18 ENCOUNTER — ANESTHESIA EVENT (OUTPATIENT)
Dept: PERIOP | Facility: HOSPITAL | Age: 82
DRG: 481 | End: 2022-12-18
Payer: MEDICARE

## 2022-12-18 LAB
25(OH)D3 SERPL-MCNC: 40.8 NG/ML (ref 30–100)
GLUCOSE BLDC GLUCOMTR-MCNC: 235 MG/DL (ref 70–130)
GLUCOSE BLDC GLUCOMTR-MCNC: 258 MG/DL (ref 70–130)
GLUCOSE BLDC GLUCOMTR-MCNC: 263 MG/DL (ref 70–130)
GLUCOSE BLDC GLUCOMTR-MCNC: 287 MG/DL (ref 70–130)
NT-PROBNP SERPL-MCNC: 47.1 PG/ML (ref 0–1800)

## 2022-12-18 PROCEDURE — 25010000002 CEFAZOLIN IN DEXTROSE 2-4 GM/100ML-% SOLUTION: Performed by: ORTHOPAEDIC SURGERY

## 2022-12-18 PROCEDURE — 76000 FLUOROSCOPY <1 HR PHYS/QHP: CPT

## 2022-12-18 PROCEDURE — 25010000002 ONDANSETRON PER 1 MG: Performed by: NURSE ANESTHETIST, CERTIFIED REGISTERED

## 2022-12-18 PROCEDURE — 83880 ASSAY OF NATRIURETIC PEPTIDE: CPT | Performed by: HOSPITALIST

## 2022-12-18 PROCEDURE — 73501 X-RAY EXAM HIP UNI 1 VIEW: CPT

## 2022-12-18 PROCEDURE — C1713 ANCHOR/SCREW BN/BN,TIS/BN: HCPCS | Performed by: ORTHOPAEDIC SURGERY

## 2022-12-18 PROCEDURE — 25010000002 FENTANYL CITRATE (PF) 50 MCG/ML SOLUTION: Performed by: NURSE ANESTHETIST, CERTIFIED REGISTERED

## 2022-12-18 PROCEDURE — 25010000002 HYDROMORPHONE PER 4 MG: Performed by: ORTHOPAEDIC SURGERY

## 2022-12-18 PROCEDURE — 25010000002 DEXAMETHASONE SODIUM PHOSPHATE 20 MG/5ML SOLUTION: Performed by: NURSE ANESTHETIST, CERTIFIED REGISTERED

## 2022-12-18 PROCEDURE — 27245 TREAT THIGH FRACTURE: CPT | Performed by: ORTHOPAEDIC SURGERY

## 2022-12-18 PROCEDURE — 25010000002 MORPHINE PER 10 MG: Performed by: HOSPITALIST

## 2022-12-18 PROCEDURE — 25010000002 NEOSTIGMINE 5 MG/10ML SOLUTION: Performed by: NURSE ANESTHETIST, CERTIFIED REGISTERED

## 2022-12-18 PROCEDURE — 73502 X-RAY EXAM HIP UNI 2-3 VIEWS: CPT

## 2022-12-18 PROCEDURE — 63710000001 INSULIN LISPRO (HUMAN) PER 5 UNITS: Performed by: HOSPITALIST

## 2022-12-18 PROCEDURE — 25010000002 PROPOFOL 10 MG/ML EMULSION: Performed by: NURSE ANESTHETIST, CERTIFIED REGISTERED

## 2022-12-18 PROCEDURE — 82962 GLUCOSE BLOOD TEST: CPT

## 2022-12-18 PROCEDURE — 0 LIDOCAINE 1 % SOLUTION 20 ML VIAL: Performed by: ORTHOPAEDIC SURGERY

## 2022-12-18 PROCEDURE — 25010000002 FENTANYL CITRATE (PF) 50 MCG/ML SOLUTION: Performed by: ANESTHESIOLOGY

## 2022-12-18 PROCEDURE — 99222 1ST HOSP IP/OBS MODERATE 55: CPT | Performed by: ORTHOPAEDIC SURGERY

## 2022-12-18 PROCEDURE — 82306 VITAMIN D 25 HYDROXY: CPT | Performed by: ORTHOPAEDIC SURGERY

## 2022-12-18 PROCEDURE — C1769 GUIDE WIRE: HCPCS | Performed by: ORTHOPAEDIC SURGERY

## 2022-12-18 PROCEDURE — 0QS736Z REPOSITION LEFT UPPER FEMUR WITH INTRAMEDULLARY INTERNAL FIXATION DEVICE, PERCUTANEOUS APPROACH: ICD-10-PCS | Performed by: ORTHOPAEDIC SURGERY

## 2022-12-18 DEVICE — LOCKING SCREW, FULLY THREADED: Type: IMPLANTABLE DEVICE | Site: FEMUR | Status: FUNCTIONAL

## 2022-12-18 DEVICE — LONG NAIL KIT R1.5, TI, LEFT
Type: IMPLANTABLE DEVICE | Site: TROCHANTER | Status: FUNCTIONAL
Brand: GAMMA

## 2022-12-18 DEVICE — LAG SCREW, TI
Type: IMPLANTABLE DEVICE | Site: TROCHANTER | Status: FUNCTIONAL
Brand: GAMMA

## 2022-12-18 RX ORDER — FENTANYL CITRATE 50 UG/ML
25 INJECTION, SOLUTION INTRAMUSCULAR; INTRAVENOUS
Status: DISCONTINUED | OUTPATIENT
Start: 2022-12-18 | End: 2022-12-18 | Stop reason: HOSPADM

## 2022-12-18 RX ORDER — ONDANSETRON 2 MG/ML
4 INJECTION INTRAMUSCULAR; INTRAVENOUS ONCE AS NEEDED
Status: DISCONTINUED | OUTPATIENT
Start: 2022-12-18 | End: 2022-12-18 | Stop reason: HOSPADM

## 2022-12-18 RX ORDER — FAMOTIDINE 10 MG/ML
20 INJECTION, SOLUTION INTRAVENOUS ONCE
Status: DISCONTINUED | OUTPATIENT
Start: 2022-12-18 | End: 2022-12-18

## 2022-12-18 RX ORDER — NEOSTIGMINE METHYLSULFATE 0.5 MG/ML
INJECTION, SOLUTION INTRAVENOUS AS NEEDED
Status: DISCONTINUED | OUTPATIENT
Start: 2022-12-18 | End: 2022-12-18 | Stop reason: SURG

## 2022-12-18 RX ORDER — INSULIN LISPRO 100 [IU]/ML
0-7 INJECTION, SOLUTION INTRAVENOUS; SUBCUTANEOUS
Status: DISCONTINUED | OUTPATIENT
Start: 2022-12-18 | End: 2022-12-20 | Stop reason: HOSPADM

## 2022-12-18 RX ORDER — ONDANSETRON 4 MG/1
4 TABLET, FILM COATED ORAL EVERY 6 HOURS PRN
Status: DISCONTINUED | OUTPATIENT
Start: 2022-12-18 | End: 2022-12-19

## 2022-12-18 RX ORDER — FLUMAZENIL 0.1 MG/ML
0.2 INJECTION INTRAVENOUS AS NEEDED
Status: DISCONTINUED | OUTPATIENT
Start: 2022-12-18 | End: 2022-12-18 | Stop reason: HOSPADM

## 2022-12-18 RX ORDER — PANTOPRAZOLE SODIUM 40 MG/1
40 TABLET, DELAYED RELEASE ORAL
Status: DISCONTINUED | OUTPATIENT
Start: 2022-12-19 | End: 2022-12-20 | Stop reason: HOSPADM

## 2022-12-18 RX ORDER — CEFAZOLIN SODIUM 2 G/100ML
2 INJECTION, SOLUTION INTRAVENOUS EVERY 8 HOURS
Status: COMPLETED | OUTPATIENT
Start: 2022-12-18 | End: 2022-12-19

## 2022-12-18 RX ORDER — PHENYLEPHRINE HCL IN 0.9% NACL 1 MG/10 ML
SYRINGE (ML) INTRAVENOUS AS NEEDED
Status: DISCONTINUED | OUTPATIENT
Start: 2022-12-18 | End: 2022-12-18 | Stop reason: SURG

## 2022-12-18 RX ORDER — LABETALOL HYDROCHLORIDE 5 MG/ML
5 INJECTION, SOLUTION INTRAVENOUS
Status: DISCONTINUED | OUTPATIENT
Start: 2022-12-18 | End: 2022-12-18 | Stop reason: HOSPADM

## 2022-12-18 RX ORDER — FENTANYL CITRATE 50 UG/ML
INJECTION, SOLUTION INTRAMUSCULAR; INTRAVENOUS AS NEEDED
Status: DISCONTINUED | OUTPATIENT
Start: 2022-12-18 | End: 2022-12-18 | Stop reason: SURG

## 2022-12-18 RX ORDER — LEVOTHYROXINE SODIUM 0.05 MG/1
50 TABLET ORAL DAILY
Status: DISCONTINUED | OUTPATIENT
Start: 2022-12-18 | End: 2022-12-20 | Stop reason: HOSPADM

## 2022-12-18 RX ORDER — OXYCODONE AND ACETAMINOPHEN 7.5; 325 MG/1; MG/1
1 TABLET ORAL EVERY 4 HOURS PRN
Status: DISCONTINUED | OUTPATIENT
Start: 2022-12-18 | End: 2022-12-18 | Stop reason: HOSPADM

## 2022-12-18 RX ORDER — CEFAZOLIN SODIUM 2 G/100ML
2 INJECTION, SOLUTION INTRAVENOUS ONCE
Status: DISCONTINUED | OUTPATIENT
Start: 2022-12-18 | End: 2022-12-18

## 2022-12-18 RX ORDER — ONDANSETRON 2 MG/ML
INJECTION INTRAMUSCULAR; INTRAVENOUS AS NEEDED
Status: DISCONTINUED | OUTPATIENT
Start: 2022-12-18 | End: 2022-12-18 | Stop reason: SURG

## 2022-12-18 RX ORDER — SODIUM CHLORIDE 0.9 % (FLUSH) 0.9 %
10 SYRINGE (ML) INJECTION AS NEEDED
Status: DISCONTINUED | OUTPATIENT
Start: 2022-12-18 | End: 2022-12-20 | Stop reason: HOSPADM

## 2022-12-18 RX ORDER — MEMANTINE HYDROCHLORIDE 10 MG/1
10 TABLET ORAL NIGHTLY
Status: DISCONTINUED | OUTPATIENT
Start: 2022-12-18 | End: 2022-12-20 | Stop reason: HOSPADM

## 2022-12-18 RX ORDER — NALOXONE HCL 0.4 MG/ML
0.2 VIAL (ML) INJECTION AS NEEDED
Status: DISCONTINUED | OUTPATIENT
Start: 2022-12-18 | End: 2022-12-18 | Stop reason: HOSPADM

## 2022-12-18 RX ORDER — PROPOFOL 10 MG/ML
VIAL (ML) INTRAVENOUS AS NEEDED
Status: DISCONTINUED | OUTPATIENT
Start: 2022-12-18 | End: 2022-12-18 | Stop reason: SURG

## 2022-12-18 RX ORDER — MORPHINE SULFATE 2 MG/ML
2 INJECTION, SOLUTION INTRAMUSCULAR; INTRAVENOUS ONCE
Status: DISCONTINUED | OUTPATIENT
Start: 2022-12-18 | End: 2022-12-18

## 2022-12-18 RX ORDER — ASPIRIN 81 MG/1
81 TABLET ORAL EVERY 12 HOURS
Status: DISCONTINUED | OUTPATIENT
Start: 2022-12-19 | End: 2022-12-20 | Stop reason: HOSPADM

## 2022-12-18 RX ORDER — CEFAZOLIN SODIUM 2 G/100ML
2 INJECTION, SOLUTION INTRAVENOUS ONCE
Status: COMPLETED | OUTPATIENT
Start: 2022-12-18 | End: 2022-12-18

## 2022-12-18 RX ORDER — FAMOTIDINE 10 MG/ML
20 INJECTION, SOLUTION INTRAVENOUS ONCE
Status: COMPLETED | OUTPATIENT
Start: 2022-12-18 | End: 2022-12-18

## 2022-12-18 RX ORDER — ATORVASTATIN CALCIUM 10 MG/1
10 TABLET, FILM COATED ORAL NIGHTLY
Status: DISCONTINUED | OUTPATIENT
Start: 2022-12-18 | End: 2022-12-20 | Stop reason: HOSPADM

## 2022-12-18 RX ORDER — HYDRALAZINE HYDROCHLORIDE 20 MG/ML
5 INJECTION INTRAMUSCULAR; INTRAVENOUS
Status: DISCONTINUED | OUTPATIENT
Start: 2022-12-18 | End: 2022-12-18 | Stop reason: HOSPADM

## 2022-12-18 RX ORDER — SERTRALINE HYDROCHLORIDE 25 MG/1
25 TABLET, FILM COATED ORAL NIGHTLY
Status: DISCONTINUED | OUTPATIENT
Start: 2022-12-18 | End: 2022-12-20 | Stop reason: HOSPADM

## 2022-12-18 RX ORDER — SERTRALINE HYDROCHLORIDE 25 MG/1
25 TABLET, FILM COATED ORAL DAILY
Status: DISCONTINUED | OUTPATIENT
Start: 2022-12-18 | End: 2022-12-18

## 2022-12-18 RX ORDER — GLYCOPYRROLATE 0.2 MG/ML
INJECTION INTRAMUSCULAR; INTRAVENOUS AS NEEDED
Status: DISCONTINUED | OUTPATIENT
Start: 2022-12-18 | End: 2022-12-18 | Stop reason: SURG

## 2022-12-18 RX ORDER — DEXAMETHASONE SODIUM PHOSPHATE 4 MG/ML
INJECTION, SOLUTION INTRA-ARTICULAR; INTRALESIONAL; INTRAMUSCULAR; INTRAVENOUS; SOFT TISSUE AS NEEDED
Status: DISCONTINUED | OUTPATIENT
Start: 2022-12-18 | End: 2022-12-18 | Stop reason: SURG

## 2022-12-18 RX ORDER — ONDANSETRON 2 MG/ML
4 INJECTION INTRAMUSCULAR; INTRAVENOUS EVERY 6 HOURS PRN
Status: DISCONTINUED | OUTPATIENT
Start: 2022-12-18 | End: 2022-12-20 | Stop reason: HOSPADM

## 2022-12-18 RX ORDER — LIDOCAINE HYDROCHLORIDE 10 MG/ML
0.5 INJECTION, SOLUTION EPIDURAL; INFILTRATION; INTRACAUDAL; PERINEURAL ONCE AS NEEDED
Status: DISCONTINUED | OUTPATIENT
Start: 2022-12-18 | End: 2022-12-18 | Stop reason: HOSPADM

## 2022-12-18 RX ORDER — HYDROCODONE BITARTRATE AND ACETAMINOPHEN 7.5; 325 MG/1; MG/1
1 TABLET ORAL ONCE AS NEEDED
Status: DISCONTINUED | OUTPATIENT
Start: 2022-12-18 | End: 2022-12-18 | Stop reason: HOSPADM

## 2022-12-18 RX ORDER — MAGNESIUM HYDROXIDE 1200 MG/15ML
LIQUID ORAL AS NEEDED
Status: DISCONTINUED | OUTPATIENT
Start: 2022-12-18 | End: 2022-12-18 | Stop reason: HOSPADM

## 2022-12-18 RX ORDER — SODIUM CHLORIDE 9 MG/ML
40 INJECTION, SOLUTION INTRAVENOUS AS NEEDED
Status: DISCONTINUED | OUTPATIENT
Start: 2022-12-18 | End: 2022-12-20 | Stop reason: HOSPADM

## 2022-12-18 RX ORDER — ROCURONIUM BROMIDE 10 MG/ML
INJECTION, SOLUTION INTRAVENOUS AS NEEDED
Status: DISCONTINUED | OUTPATIENT
Start: 2022-12-18 | End: 2022-12-18 | Stop reason: SURG

## 2022-12-18 RX ORDER — FENTANYL CITRATE 50 UG/ML
50 INJECTION, SOLUTION INTRAMUSCULAR; INTRAVENOUS
Status: DISCONTINUED | OUTPATIENT
Start: 2022-12-18 | End: 2022-12-18 | Stop reason: HOSPADM

## 2022-12-18 RX ORDER — ASPIRIN 81 MG/1
81 TABLET ORAL DAILY
Status: DISCONTINUED | OUTPATIENT
Start: 2022-12-18 | End: 2022-12-18

## 2022-12-18 RX ORDER — HYDROMORPHONE HYDROCHLORIDE 1 MG/ML
0.5 INJECTION, SOLUTION INTRAMUSCULAR; INTRAVENOUS; SUBCUTANEOUS
Status: DISCONTINUED | OUTPATIENT
Start: 2022-12-18 | End: 2022-12-19

## 2022-12-18 RX ORDER — SODIUM CHLORIDE 0.9 % (FLUSH) 0.9 %
3-10 SYRINGE (ML) INJECTION AS NEEDED
Status: DISCONTINUED | OUTPATIENT
Start: 2022-12-18 | End: 2022-12-18 | Stop reason: HOSPADM

## 2022-12-18 RX ORDER — DROPERIDOL 2.5 MG/ML
0.62 INJECTION, SOLUTION INTRAMUSCULAR; INTRAVENOUS
Status: DISCONTINUED | OUTPATIENT
Start: 2022-12-18 | End: 2022-12-18 | Stop reason: HOSPADM

## 2022-12-18 RX ORDER — SODIUM CHLORIDE 0.9 % (FLUSH) 0.9 %
3 SYRINGE (ML) INJECTION EVERY 12 HOURS SCHEDULED
Status: DISCONTINUED | OUTPATIENT
Start: 2022-12-18 | End: 2022-12-18

## 2022-12-18 RX ORDER — HYDROCODONE BITARTRATE AND ACETAMINOPHEN 5; 325 MG/1; MG/1
1 TABLET ORAL EVERY 6 HOURS PRN
Status: DISCONTINUED | OUTPATIENT
Start: 2022-12-18 | End: 2022-12-19

## 2022-12-18 RX ORDER — DOCUSATE SODIUM 100 MG/1
100 CAPSULE, LIQUID FILLED ORAL 2 TIMES DAILY PRN
Status: DISCONTINUED | OUTPATIENT
Start: 2022-12-18 | End: 2022-12-20 | Stop reason: HOSPADM

## 2022-12-18 RX ORDER — ACETAMINOPHEN 325 MG/1
325 TABLET ORAL EVERY 4 HOURS PRN
Status: DISCONTINUED | OUTPATIENT
Start: 2022-12-18 | End: 2022-12-20 | Stop reason: HOSPADM

## 2022-12-18 RX ORDER — SUCCINYLCHOLINE/SOD CL,ISO/PF 200MG/10ML
SYRINGE (ML) INTRAVENOUS AS NEEDED
Status: DISCONTINUED | OUTPATIENT
Start: 2022-12-18 | End: 2022-12-18 | Stop reason: SURG

## 2022-12-18 RX ORDER — NALOXONE HCL 0.4 MG/ML
0.1 VIAL (ML) INJECTION
Status: DISCONTINUED | OUTPATIENT
Start: 2022-12-18 | End: 2022-12-19

## 2022-12-18 RX ORDER — ONDANSETRON 2 MG/ML
4 INJECTION INTRAMUSCULAR; INTRAVENOUS EVERY 6 HOURS PRN
Status: DISCONTINUED | OUTPATIENT
Start: 2022-12-18 | End: 2022-12-18 | Stop reason: SDUPTHER

## 2022-12-18 RX ORDER — HYDROMORPHONE HYDROCHLORIDE 1 MG/ML
0.5 INJECTION, SOLUTION INTRAMUSCULAR; INTRAVENOUS; SUBCUTANEOUS
Status: DISCONTINUED | OUTPATIENT
Start: 2022-12-18 | End: 2022-12-18 | Stop reason: HOSPADM

## 2022-12-18 RX ORDER — DROPERIDOL 2.5 MG/ML
0.62 INJECTION, SOLUTION INTRAMUSCULAR; INTRAVENOUS ONCE AS NEEDED
Status: DISCONTINUED | OUTPATIENT
Start: 2022-12-18 | End: 2022-12-18 | Stop reason: HOSPADM

## 2022-12-18 RX ORDER — ATORVASTATIN CALCIUM 10 MG/1
10 TABLET, FILM COATED ORAL DAILY
Status: DISCONTINUED | OUTPATIENT
Start: 2022-12-18 | End: 2022-12-18

## 2022-12-18 RX ORDER — SODIUM CHLORIDE, SODIUM LACTATE, POTASSIUM CHLORIDE, CALCIUM CHLORIDE 600; 310; 30; 20 MG/100ML; MG/100ML; MG/100ML; MG/100ML
9 INJECTION, SOLUTION INTRAVENOUS CONTINUOUS
Status: DISCONTINUED | OUTPATIENT
Start: 2022-12-18 | End: 2022-12-19

## 2022-12-18 RX ORDER — SODIUM CHLORIDE 0.9 % (FLUSH) 0.9 %
3 SYRINGE (ML) INJECTION EVERY 12 HOURS SCHEDULED
Status: DISCONTINUED | OUTPATIENT
Start: 2022-12-18 | End: 2022-12-18 | Stop reason: HOSPADM

## 2022-12-18 RX ORDER — SODIUM CHLORIDE, SODIUM LACTATE, POTASSIUM CHLORIDE, CALCIUM CHLORIDE 600; 310; 30; 20 MG/100ML; MG/100ML; MG/100ML; MG/100ML
100 INJECTION, SOLUTION INTRAVENOUS CONTINUOUS
Status: DISCONTINUED | OUTPATIENT
Start: 2022-12-18 | End: 2022-12-18

## 2022-12-18 RX ORDER — EPHEDRINE SULFATE 50 MG/ML
5 INJECTION, SOLUTION INTRAVENOUS ONCE AS NEEDED
Status: DISCONTINUED | OUTPATIENT
Start: 2022-12-18 | End: 2022-12-18 | Stop reason: HOSPADM

## 2022-12-18 RX ORDER — SACCHAROMYCES BOULARDII 250 MG
250 CAPSULE ORAL DAILY
Status: DISCONTINUED | OUTPATIENT
Start: 2022-12-18 | End: 2022-12-20 | Stop reason: HOSPADM

## 2022-12-18 RX ORDER — POLYETHYLENE GLYCOL 3350 17 G/17G
17 POWDER, FOR SOLUTION ORAL DAILY
Status: DISCONTINUED | OUTPATIENT
Start: 2022-12-18 | End: 2022-12-20 | Stop reason: HOSPADM

## 2022-12-18 RX ORDER — LIDOCAINE HYDROCHLORIDE 20 MG/ML
INJECTION, SOLUTION INFILTRATION; PERINEURAL AS NEEDED
Status: DISCONTINUED | OUTPATIENT
Start: 2022-12-18 | End: 2022-12-18 | Stop reason: SURG

## 2022-12-18 RX ADMIN — INSULIN LISPRO 4 UNITS: 100 INJECTION, SOLUTION INTRAVENOUS; SUBCUTANEOUS at 21:29

## 2022-12-18 RX ADMIN — MORPHINE SULFATE 2 MG: 2 INJECTION, SOLUTION INTRAMUSCULAR; INTRAVENOUS at 03:50

## 2022-12-18 RX ADMIN — FENTANYL CITRATE 25 MCG: 50 INJECTION, SOLUTION INTRAMUSCULAR; INTRAVENOUS at 11:51

## 2022-12-18 RX ADMIN — ROCURONIUM BROMIDE 30 MG: 10 INJECTION, SOLUTION INTRAVENOUS at 10:16

## 2022-12-18 RX ADMIN — Medication 120 MG: at 10:06

## 2022-12-18 RX ADMIN — ROCURONIUM BROMIDE 5 MG: 10 INJECTION, SOLUTION INTRAVENOUS at 10:06

## 2022-12-18 RX ADMIN — SERTRALINE 25 MG: 25 TABLET, FILM COATED ORAL at 21:16

## 2022-12-18 RX ADMIN — Medication 100 MCG: at 10:22

## 2022-12-18 RX ADMIN — Medication 100 MCG: at 11:34

## 2022-12-18 RX ADMIN — FENTANYL CITRATE 25 MCG: 50 INJECTION, SOLUTION INTRAMUSCULAR; INTRAVENOUS at 09:19

## 2022-12-18 RX ADMIN — HYDROCODONE BITARTRATE AND ACETAMINOPHEN 1 TABLET: 5; 325 TABLET ORAL at 23:57

## 2022-12-18 RX ADMIN — FENTANYL CITRATE 25 MCG: 50 INJECTION, SOLUTION INTRAMUSCULAR; INTRAVENOUS at 10:51

## 2022-12-18 RX ADMIN — MEMANTINE HYDROCHLORIDE 10 MG: 10 TABLET, FILM COATED ORAL at 21:16

## 2022-12-18 RX ADMIN — HYDROCODONE BITARTRATE AND ACETAMINOPHEN 1 TABLET: 5; 325 TABLET ORAL at 14:51

## 2022-12-18 RX ADMIN — FENTANYL CITRATE 25 MCG: 50 INJECTION, SOLUTION INTRAMUSCULAR; INTRAVENOUS at 09:34

## 2022-12-18 RX ADMIN — NEOSTIGMINE METHYLSULFATE 3 MG: 0.5 INJECTION INTRAVENOUS at 12:05

## 2022-12-18 RX ADMIN — LEVOTHYROXINE SODIUM 50 MCG: 0.05 TABLET ORAL at 14:51

## 2022-12-18 RX ADMIN — Medication 200 MCG: at 11:48

## 2022-12-18 RX ADMIN — CEFAZOLIN SODIUM 2 G: 2 INJECTION, SOLUTION INTRAVENOUS at 09:55

## 2022-12-18 RX ADMIN — Medication 100 MCG: at 10:45

## 2022-12-18 RX ADMIN — Medication 250 MG: at 14:51

## 2022-12-18 RX ADMIN — LIDOCAINE HYDROCHLORIDE 60 MG: 20 INJECTION, SOLUTION INFILTRATION; PERINEURAL at 10:06

## 2022-12-18 RX ADMIN — GLYCOPYRROLATE 0.4 MCG: 1 INJECTION INTRAMUSCULAR; INTRAVENOUS at 12:05

## 2022-12-18 RX ADMIN — ATORVASTATIN CALCIUM 10 MG: 10 TABLET, FILM COATED ORAL at 21:16

## 2022-12-18 RX ADMIN — FAMOTIDINE 20 MG: 10 INJECTION INTRAVENOUS at 09:18

## 2022-12-18 RX ADMIN — Medication 200 MCG: at 11:20

## 2022-12-18 RX ADMIN — SODIUM CHLORIDE, POTASSIUM CHLORIDE, SODIUM LACTATE AND CALCIUM CHLORIDE 9 ML/HR: 600; 310; 30; 20 INJECTION, SOLUTION INTRAVENOUS at 09:19

## 2022-12-18 RX ADMIN — PROPOFOL 100 MCG/KG/MIN: 10 INJECTION, EMULSION INTRAVENOUS at 10:38

## 2022-12-18 RX ADMIN — INSULIN GLARGINE-YFGN 10 UNITS: 100 INJECTION, SOLUTION SUBCUTANEOUS at 21:29

## 2022-12-18 RX ADMIN — INSULIN LISPRO 4 UNITS: 100 INJECTION, SOLUTION INTRAVENOUS; SUBCUTANEOUS at 17:56

## 2022-12-18 RX ADMIN — ONDANSETRON 4 MG: 2 INJECTION INTRAMUSCULAR; INTRAVENOUS at 10:01

## 2022-12-18 RX ADMIN — CEFAZOLIN SODIUM 2 G: 2 INJECTION, SOLUTION INTRAVENOUS at 17:56

## 2022-12-18 RX ADMIN — HYDROMORPHONE HYDROCHLORIDE 0.5 MG: 1 INJECTION, SOLUTION INTRAMUSCULAR; INTRAVENOUS; SUBCUTANEOUS at 16:29

## 2022-12-18 RX ADMIN — DEXAMETHASONE SODIUM PHOSPHATE 4 MG: 4 INJECTION, SOLUTION INTRAMUSCULAR; INTRAVENOUS at 10:29

## 2022-12-18 RX ADMIN — SODIUM CHLORIDE, POTASSIUM CHLORIDE, SODIUM LACTATE AND CALCIUM CHLORIDE: 600; 310; 30; 20 INJECTION, SOLUTION INTRAVENOUS at 11:59

## 2022-12-18 RX ADMIN — FENTANYL CITRATE 50 MCG: 50 INJECTION, SOLUTION INTRAMUSCULAR; INTRAVENOUS at 12:40

## 2022-12-18 RX ADMIN — Medication 100 MCG: at 11:11

## 2022-12-18 RX ADMIN — PROPOFOL 100 MG: 10 INJECTION, EMULSION INTRAVENOUS at 10:06

## 2022-12-18 NOTE — PLAN OF CARE
Goal Outcome Evaluation:  Plan of Care Reviewed With: patient      A/o x3, vss, nvi, pain controlled with PRN meds, dressings c/d/I, voiding per pw, accumax in place and q2 turn, plans to d/c to SNF when medically stable, educated on blood sugar monitoring and control.   Progress: improving

## 2022-12-18 NOTE — ANESTHESIA POSTPROCEDURE EVALUATION
"Patient: Velma Bangura    Procedure Summary     Date: 12/18/22 Room / Location: [facility] OR [address] [place] [address] MAIN OR    Anesthesia Start: 0959 Anesthesia Stop: 1215    Procedure: HIP INTERTROCHANTERIC NAILING (Left: Thigh) Diagnosis:       Closed fracture of left hip, initial encounter ([facility])      (Closed fracture of left hip, initial encounter ([facility]) [S72.002A])    Surgeons: Williams Church MD Provider: Kelly Arita MD    Anesthesia Type: general ASA Status: 3          Anesthesia Type: general    Vitals  Vitals Value Taken Time   /60 12/18/22 1316   Temp 36.9 °C (98.4 °F) 12/18/22 1310   Pulse 66 12/18/22 1317   Resp 14 12/18/22 1315   SpO2 92 % 12/18/22 1317   Vitals shown include unvalidated device data.        Post Anesthesia Care and Evaluation    Patient location during evaluation: PACU  Patient participation: complete - patient participated  Level of consciousness: awake  Pain management: adequate    Airway patency: patent  Anesthetic complications: No anesthetic complications    Cardiovascular status: acceptable  Respiratory status: acceptable  Hydration status: acceptable    Comments: /75 (BP Location: Left arm, Patient Position: Lying)   Pulse 67   Temp 36.5 °C (97.7 °F) (Oral)   Resp 16   Ht 165.1 cm (65\")   Wt 89.1 kg (196 lb 6.9 oz)   SpO2 92%   BMI 32.69 kg/m²       "

## 2022-12-18 NOTE — PLAN OF CARE
Goal Outcome Evaluation:  Plan of Care Reviewed With: patient        Progress: no change  Outcome Evaluation: Continues to complain of pain. IV pain meds given q4 hours. NPO for surgery today. Family made aware.

## 2022-12-18 NOTE — NURSING NOTE
Patient vomiting, complaining of pain in L knee, surgery time changed to now, called her son and left a voice mail.  Patient sent to pre-op.

## 2022-12-18 NOTE — NURSING NOTE
Called Dr. Church answering service to notify him it is the left hip that is broken not the right.

## 2022-12-18 NOTE — ANESTHESIA PROCEDURE NOTES
Airway  Urgency: elective    Date/Time: 12/18/2022 10:08 AM  Airway not difficult    General Information and Staff    Patient location during procedure: OR  Anesthesiologist: Kelly Arita MD  CRNA/CAA: Nadeen Sands CRNA    Indications and Patient Condition  Indications for airway management: airway protection    Preoxygenated: yes  MILS maintained throughout  Mask difficulty assessment: 0 - not attempted    Final Airway Details  Final airway type: endotracheal airway      Successful airway: ETT  Cuffed: yes   Successful intubation technique: direct laryngoscopy  Facilitating devices/methods: anterior pressure/BURP, cricoid pressure and intubating stylet  Endotracheal tube insertion site: oral  Blade: Oliver  Blade size: 2  ETT size (mm): 7.0  Cormack-Lehane Classification: grade I - full view of glottis  Placement verified by: chest auscultation and capnometry   Cuff volume (mL): 8  Measured from: lips  ETT/EBT  to lips (cm): 21  Number of attempts at approach: 1  Assessment: lips, teeth, and gum same as pre-op and atraumatic intubation    Additional Comments  Pt preoxygenated, RSI with cricoid pressure, ATETI, dentition as before

## 2022-12-18 NOTE — H&P
History and physical    Primary care physician      Chief complaint  Left knee pain    History of present illness  82-year-old white female with history of severe dementia diabetes mellitus hypertension hypothyroidism and severe osteoarthritis who is a nursing home resident brought to the emergency room after the mechanical fall with left knee pain.  Patient evaluated in ER found to have left hip fracture admit for management.  At the time of interview patient is awake and alert in mild distress secondary to pain and unable to give detailed history and most of the history obtained from the family.  Patient apparently did not lose consciousness or hit her head and denies any chest pain shortness of breath palpitation dizziness lightheadedness prior to the fall to the nursing staff but feels weak.  Patient is DNR per her wishes      PAST MEDICAL HISTORY  • Osteoarthritis     • Dementia      • Depression     • Diabetes mellitus      • Hypothyroidism     • Hypotension        PAST SURGICAL HISTORY              Procedure Laterality Date   • BREAST LUMPECTOMY Left     • KNEE ARTHROPLASTY Left           FAMILY HISTORY           Problem Relation Age of Onset   • Arthritis Mother     • Dementia Mother     • Migraines Mother     • Cancer Father     • Heart disease Father        SOCIAL HISTORY                Socioeconomic History   • Marital status:    Tobacco Use   • Smoking status: Never   • Smokeless tobacco: Never   Vaping Use   • Vaping Use: Never used   Substance and Sexual Activity   • Alcohol use: Never   • Drug use: Never   • Sexual activity: Defer         ALLERGIES  Patient has no known allergies.  Nursing home medications reviewed     REVIEW OF SYSTEMS  Limited due to dementia    PHYSICAL EXAM  Blood pressure 121/75, pulse 67, temperature 97.7 °F (36.5 °C), temperature source Oral, resp. rate 16, height 165.1 cm (65\"), weight 89.1 kg (196 lb 6.9 oz), SpO2 92 %.    GENERAL: Awake and alert, mild  distress  HEENT:  Unremarkable  NECK:  Supple  CV: regular rhythm, normal rate, distal pulses symmetric and palpable  RESPIRATORY: normal effort moving air bilaterally  ABDOMEN: soft, nondistended nontender with normal bowel sound  MUSCULOSKELETAL: no deformity.  No obvious deformity, but the leg is held in flexion due to discomfort.  There is tenderness palpation of the hip and knee but no obvious deformity and significant pain with range of motion especially at the hip.    NEURO: alert, moves all extremities, follows commands  PSYCH:  calm, cooperative  SKIN: warm, dry with no rash     LAB RESULTS  Lab Results (last 24 hours)     Procedure Component Value Units Date/Time    POC Glucose Once [[id number]]  (Abnormal) Collected: 12/18/22 1232    Specimen: Blood Updated: 12/18/22 1233     Glucose 258 mg/dL      Comment: Meter: [id number] : [id number] Dimas Adams RN       POC Glucose Once [[id number]]  (Abnormal) Collected: 12/18/22 0612    Specimen: Blood Updated: 12/18/22 0614     Glucose 235 mg/dL      Comment: Meter: [id number] : [id number] Dereck DIAS       POC Glucose Once [[id number]]  (Abnormal) Collected: 12/17/22 2109    Specimen: Blood Updated: 12/17/22 2111     Glucose 241 mg/dL      Comment: Meter: [id number] : [id number] Dereck DIAS       Comprehensive Metabolic Panel [[id number]]  (Abnormal) Collected: 12/17/22 1723    Specimen: Blood Updated: 12/17/22 1756     Glucose 248 mg/dL      BUN 15 mg/dL      Creatinine 0.90 mg/dL      Sodium 137 mmol/L      Potassium 4.6 mmol/L      Chloride 97 mmol/L      CO2 25.0 mmol/L      Calcium 9.2 mg/dL      Total Protein 6.8 g/dL      Albumin 3.80 g/dL      ALT (SGPT) 30 U/L      AST (SGOT) 30 U/L      Alkaline Phosphatase 112 U/L      Total Bilirubin 0.3 mg/dL      Globulin 3.0 gm/dL      A/G Ratio 1.3 g/dL      BUN/Creatinine Ratio 16.7     Anion Gap 15.0 mmol/L      eGFR 63.2 mL/min/1.73      Comment: National Kidney Foundation and American  Society of Nephrology (ASN) Task Force recommended calculation based on the Chronic Kidney Disease Epidemiology Collaboration (CKD-EPI) equation refit without adjustment for race.       Narrative:      GFR Normal >60  Chronic Kidney Disease <60  Kidney Failure <15    The GFR formula is only valid for adults with stable renal function between ages 18 and 70.    Protime-INR [[id number]]  (Normal) Collected: 12/17/22 1723    Specimen: Blood Updated: 12/17/22 1749     Protime 13.3 Seconds      INR 1.00    aPTT [[id number]]  (Normal) Collected: 12/17/22 1723    Specimen: Blood Updated: 12/17/22 1749     PTT 25.8 seconds     CBC & Differential [[id number]]  (Abnormal) Collected: 12/17/22 1723    Specimen: Blood Updated: 12/17/22 1741    Narrative:      The following orders were created for panel order CBC & Differential.  Procedure                               Abnormality         Status                     ---------                               -----------         ------                     CBC Auto Differential[[id number]]        Abnormal            Final result                 Please view results for these tests on the individual orders.    CBC Auto Differential [[id number]]  (Abnormal) Collected: 12/17/22 1723    Specimen: Blood Updated: 12/17/22 1741     WBC 13.44 10*3/mm3      RBC 4.99 10*6/mm3      Hemoglobin 15.6 g/dL      Hematocrit 45.7 %      MCV 91.6 fL      MCH 31.3 pg      MCHC 34.1 g/dL      RDW 11.9 %      RDW-SD 40.2 fl      MPV 10.4 fL      Platelets 279 10*3/mm3      Neutrophil % 80.9 %      Lymphocyte % 11.5 %      Monocyte % 6.3 %      Eosinophil % 0.5 %      Basophil % 0.4 %      Immature Grans % 0.4 %      Neutrophils, Absolute 10.88 10*3/mm3      Lymphocytes, Absolute 1.54 10*3/mm3      Monocytes, Absolute 0.84 10*3/mm3      Eosinophils, Absolute 0.07 10*3/mm3      Basophils, Absolute 0.05 10*3/mm3      Immature Grans, Absolute 0.06 10*3/mm3      nRBC 0.0 /100 WBC         Imaging Results (Last 24  Hours)     Procedure Component Value Units Date/Time    XR Hip With or Without Pelvis 2 - 3 View Left [[id number]] Collected: 12/18/22 1337     Updated: 12/18/22 1341    Narrative:      XR HIP W OR WO PELVIS 2-3 VIEW LEFT-, FL C ARM DURING SURGERY-     INDICATIONS: ORIF     TECHNIQUE: FLUOROSCOPIC ASSISTANCE IN THE OPERATING ROOM.     FINDINGS:     5 intraoperative fluoroscopic spot views were obtained and recorded the  PACS for review, revealing soraida and screw hardware fixation of left  intertrochanteric fracture. Please see operative report for full  details.     Fluoroscopy time: 180.0 seconds       Impression:         As described.     This report was finalized on 12/18/2022 1:38 PM by Dr. Avelino Chambers M.D.       FL C Arm During Surgery [[id number]] Collected: 12/18/22 1337     Updated: 12/18/22 1341    Narrative:      XR HIP W OR WO PELVIS 2-3 VIEW LEFT-, FL C ARM DURING SURGERY-     INDICATIONS: ORIF     TECHNIQUE: FLUOROSCOPIC ASSISTANCE IN THE OPERATING ROOM.     FINDINGS:     5 intraoperative fluoroscopic spot views were obtained and recorded the  PACS for review, revealing soraida and screw hardware fixation of left  intertrochanteric fracture. Please see operative report for full  details.     Fluoroscopy time: 180.0 seconds       Impression:         As described.     This report was finalized on 12/18/2022 1:38 PM by Dr. Avelino Chambers M.D.       XR Hip 1 View Without Pelvis Left (Surgery Only) [[id number]] Collected: 12/18/22 1336     Updated: 12/18/22 1340    Narrative:      XR HIP 1 VIEW WO PELVIS LEFT-     INDICATIONS: Postoperative evaluation.     TECHNIQUE: Frontal views of the left hip     COMPARISON: 12/17/2022     FINDINGS:      Intact appearing soraida and screw surgical hardware is seen transfixing  the left intertrochanteric fracture, with adjacent surgical soft tissue  gas, overlying skin staples. Left knee arthroplasty hardware is partly  included.          Impression:         Postsurgical  changes.           This report was finalized on 12/18/2022 1:37 PM by Dr. Avelino Chambers M.D.       XR Hip With or Without Pelvis 2 - 3 View Left [[id number]] Collected: 12/17/22 1835     Updated: 12/18/22 1053    Narrative:      EMERGENCY PORTABLE VIEW OF THE CHEST AND SINGLE VIEW OF THE PELVIS AND  TWO VIEWS OF THE LEFT HIP ON 12/17/2022        CLINICAL HISTORY: Patient fell, left hip pain.     CHEST: There are multiple surgical clips in the right axilla from  previous right axillary dissection. The cardiomediastinal silhouette and  pulmonary vasculature are within normal limits. The lungs are clear. The  costophrenic angles are sharp.       Impression:      No active disease is seen in the chest. There are surgical  clips in the right axilla from previous right axillary dissection.     PELVIS AND LEFT HIP: A single AP view of the pelvis and 2 views of the  left hip are submitted for interpretation. There is an extensive  comminuted fracture of the intertrochanteric region of the left hip with  the fracture plane extending through the base of the left lesser  trochanter extending distally into the medial aspect of the  subtrochanteric proximal left femur and there is a fracture through the  base of the left greater trochanter. There is displacement of the  greater trochanteric fracture fragment and there is varus angulation of  the fracture site. An orthopedic surgical consultation is warranted.      The results were communicated to Dave Vergara in the ER by telephone on  12/17/2022 at 5:50 PM.     This report was finalized on 12/18/2022 10:49 AM by Dr. Deion Allen M.D.       XR Chest 1 View [[id number]] Collected: 12/17/22 1835     Updated: 12/18/22 1053    Narrative:      EMERGENCY PORTABLE VIEW OF THE CHEST AND SINGLE VIEW OF THE PELVIS AND  TWO VIEWS OF THE LEFT HIP ON 12/17/2022        CLINICAL HISTORY: Patient fell, left hip pain.     CHEST: There are multiple surgical clips in the right axilla  from  previous right axillary dissection. The cardiomediastinal silhouette and  pulmonary vasculature are within normal limits. The lungs are clear. The  costophrenic angles are sharp.       Impression:      No active disease is seen in the chest. There are surgical  clips in the right axilla from previous right axillary dissection.     PELVIS AND LEFT HIP: A single AP view of the pelvis and 2 views of the  left hip are submitted for interpretation. There is an extensive  comminuted fracture of the intertrochanteric region of the left hip with  the fracture plane extending through the base of the left lesser  trochanter extending distally into the medial aspect of the  subtrochanteric proximal left femur and there is a fracture through the  base of the left greater trochanter. There is displacement of the  greater trochanteric fracture fragment and there is varus angulation of  the fracture site. An orthopedic surgical consultation is warranted.      The results were communicated to Dave Vergara in the ER by telephone on  12/17/2022 at 5:50 PM.     This report was finalized on 12/18/2022 10:49 AM by Dr. Deion Allen M.D.       XR Knee 3 View Left [[id number]] Collected: 12/17/22 1534     Updated: 12/17/22 1539    Narrative:      Emergency 3 views of left knee on 12/17/2022     CLINICAL HISTORY: Patient fell on left knee and has left knee pain     FINDINGS: 3 views including AP and lateral views and the entire left  knee and sunrise view of the patella are submitted for interpretation.  Patient has a total left knee prosthesis in place with good alignment of  the femoral and tibial components to the left knee prosthesis. There has  been previous posterior patellar resurfacing. I see no convincing acute  fracture or malalignment in the bones of the left knee. On the  crosstable lateral view no discernible knee joint effusion is seen.     This report was finalized on 12/17/2022 3:36 PM by Dr. Deion Allen M.D.               ECG 12 Lead   Component   Ref Range & Units 1 d ago    QT Interval   ms 381    Resulting Agency  ECG             HEART RATE= 86  bpm  RR Interval= 698  ms  NH Interval= 166  ms  P Horizontal Axis= -11  deg  P Front Axis= 46  deg  QRSD Interval= 99  ms  QT Interval= 381  ms  QRS Axis= 25  deg  T Wave Axis= 50  deg  - BORDERLINE ECG -  Sinus rhythm  Probable left atrial enlargement  Low voltage, precordial leads  No Prior Tracing for Comparison               Current Facility-Administered Medications:   •  acetaminophen (TYLENOL) tablet 325 mg, 325 mg, Oral, Q4H PRN, Williams Church MD  •  [START ON 12/19/2022] aspirin EC tablet 81 mg, 81 mg, Oral, Q12H, Williams Church MD  •  atorvastatin (LIPITOR) tablet 10 mg, 10 mg, Oral, Nightly, Rafael Acharya MD  •  ceFAZolin in dextrose (ANCEF) IVPB solution 2 g, 2 g, Intravenous, Q8H, Williams Church MD  •  docusate sodium (COLACE) capsule 100 mg, 100 mg, Oral, BID PRN, Williams Church MD  •  HYDROcodone-acetaminophen (NORCO) 5-325 MG per tablet 1 tablet, 1 tablet, Oral, Q6H PRN, Rafael Acharya MD  •  HYDROmorphone (DILAUDID) injection 0.5 mg, 0.5 mg, Intravenous, Q2H PRN **AND** naloxone (NARCAN) injection 0.1 mg, 0.1 mg, Intravenous, Q5 Min PRN, Williams Church MD  •  insulin lispro (ADMELOG) injection 0-7 Units, 0-7 Units, Subcutaneous, 4x Daily With Meals & Nightly, Rafael Acharya MD  •  lactated ringers infusion, 9 mL/hr, Intravenous, Continuous, Williams Church MD, Last Rate: 9 mL/hr at 12/18/22 0959, New Bag at 12/18/22 1159  •  lactated ringers infusion, 100 mL/hr, Intravenous, Continuous, Williams Church MD  •  levothyroxine (SYNTHROID, LEVOTHROID) tablet 50 mcg, 50 mcg, Oral, Daily, Rafael Acharya MD  •  memantine (NAMENDA) tablet 10 mg, 10 mg, Oral, Nightly, Rafael Acharya MD  •  ondansetron (ZOFRAN) tablet 4 mg, 4 mg, Oral, Q6H PRN **OR** ondansetron (ZOFRAN) injection 4 mg, 4 mg, Intravenous, Q6H PRN, Williams Church MD  •  [START ON  12/19/2022] pantoprazole (PROTONIX) EC tablet 40 mg, 40 mg, Oral, Q AM, Katia Acharya MD  •  polyethylene glycol (MIRALAX) packet 17 g, 17 g, Oral, Daily, Williams Church MD  •  saccharomyces boulardii (FLORASTOR) capsule 250 mg, 250 mg, Oral, Daily, Katia Acharya MD  •  sertraline (ZOLOFT) tablet 25 mg, 25 mg, Oral, Daily, Katia Acharya MD  •  [COMPLETED] Insert Peripheral IV, , , Once **AND** sodium chloride 0.9 % flush 10 mL, 10 mL, Intravenous, PRN, Williams Church MD  •  sodium chloride 0.9 % flush 10 mL, 10 mL, Intravenous, PRN, Williams Church MD  •  sodium chloride 0.9 % flush 3 mL, 3 mL, Intravenous, Q12H, Williams Church MD  •  sodium chloride 0.9 % infusion 40 mL, 40 mL, Intravenous, PRN, Williams Church MD     ASSESSMENT  Left intertrochanteric femur fracture  S/p fall  Severe dementia  Diabetes mellitus  Hypertension  Hypothyroidism  Osteoarthritis  Depression  Gastroesophageal reflux disease    PLAN  Admit   Bedrest  Pain management  Orthopedic surgery consult  Adjust nursing home medications  Stress ulcer DVT prophylaxis  Clear for surgery  Supportive care  DNR  Discussed with family and nursing staff  Follow closely further recommendation current hospital course    KATIA ACHARYA MD

## 2022-12-18 NOTE — ANESTHESIA PREPROCEDURE EVALUATION
Anesthesia Evaluation     Patient summary reviewed and Nursing notes reviewed   history of anesthetic complications: PONV  NPO Solid Status: > 8 hours  NPO Liquid Status: > 2 hours           Airway   Mallampati: II  Dental - normal exam     Pulmonary - normal exam   Cardiovascular - normal exam    ECG reviewed      ROS comment: Reviewed echo    Neuro/Psych  (+) psychiatric history Depression, dementia,    GI/Hepatic/Renal/Endo    (+)   diabetes mellitus type 2, thyroid problem     Musculoskeletal     Abdominal   (+) obese,    Substance History      OB/GYN          Other   arthritis,                    Anesthesia Plan    ASA 3     general       Anesthetic plan, risks, benefits, and alternatives have been provided, discussed and informed consent has been obtained with: child and patient.        CODE STATUS:

## 2022-12-18 NOTE — PROGRESS NOTES
Orthopedics aware.  Spoke to the nurse did not seem to have any concerning issues to delay surgery which we will plan for tomorrow.  Official orthopedic consult note to follow.    N.p.o. tonight    Hold any anticoagulation    Plan OR tomorrow    Thank you

## 2022-12-18 NOTE — OP NOTE
Orthopaedic Operative Note    Facility: [facility]    Patient: Velma Bangura     Medical Record Number: [id number]     YOB: 1940     Dictating Surgeon: Williams Church MD       Primary Care Physician: Idalia Valiente DO     Date of Operation: 12/18/2022     PREOPERATIVE DIAGNOSIS:  Left intertrochanteric femur fracture.       POSTOPERATIVE DIAGNOSIS: Left intertrochanteric femur fracture.       PROCEDURE PERFORMED: Cephalomedullary fixation of left intertrochanteric femur fracture      SURGEON: Williams Church MD      ASSISTANT: Becky Lilly      ANESTHESIA: General.       SPECIMENS: None.       COMPLICATIONS: None.       ESTIMATED BLOOD LOSS: Less than 50 mL.      IMPLANTS:     Implant Name Type Inv. Item Serial No.  Lot No. LRB No. Used Action   NAIL FEM TROCH GAMMA3 TI R1.5 125DEG [id number] LT STRL - [id number] Implant NAIL FEM TROCH GAMMA3 TI R1.5 125DEG [id number] LT STRL  [employer] [id number] Left 1 Implanted   SCRW LAG GAM3 TI 10.5X90MM STRL - [id number] Implant SCRW LAG GAM3 TI 10.5X90MM STRL  [employer] [id number] Left 1 Implanted   SCRW LK FUL/THRD 5X42.5MM STRL - [id number] Implant SCRW LK FUL/THRD 5X42.5MM STRL  [employer] [id number] Left 1 Implanted   SCRW LK FUL/THRD 5X47.5MM STRL - [id number] Implant SCRW LK FUL/THRD 5X47.5MM STRL  [employer] [id number] Left 1 Implanted       1. Kobe Gamma nail size 10 by 380 with a 125 degree neck shaft angle with 90 mm proximal compression screw and associated de-rotation screw     2. Size 42.5, 47.5 mm distal interlock screw.     INDICATIONS: The patient is a 82 y.o. female who sustained an intertrochanteric femur fracture. The risks, benefits, and alternatives to cephalomedullary fixation were discussed with the patient in detail including infection, wound healing problems, hematoma, nonunion, malunion, failure of fixation, cutout of the compression screw, positioning related injury or neuropraxia, iatrogenic  injury to major nerves or blood vessels potentially resulting in permanent dysfunction or deficits, DVT, PE, and anesthesia related complications resulting in death or the need for further surgery. The patient acknowledged understanding of information and consented to proceed.     DESCRIPTION OF PROCEDURE: The patient and operative site were identified in the preoperative holding area. The surgical site was marked. Following this, the patient was taken to the operating room and placed in the supine position. Adequate general anesthesia was administered and then patient was repositioned on the operating table on the [place] bed. Timeout was taken. Preoperative antibiotics were administered. Following this, the nonoperative leg was placed in a well-padded well leg morse. The operative leg was placed into traction. A combination of slight traction, adduction, and internal rotation were utilized to perform a closed reduction of the hip. I confirmed this fluoroscopically in both the AP and lateral planes. Once I was satisfied that the hip was sufficiently well reduced, the hip was then prepped and draped in a standard sterile fashion. I cleaned the extremity with an alcohol solution. A Hibiclens scrub was performed and the extremity was prepped with ChloraPrep x2. I allowed those to dry for 3 minutes before the extremity was draped.     Next, an approximately 3 cm incision was fashioned just proximal to the tip of the greater trochanter. I carefully dissected down through skin and subcutaneous tissues. The fascia was split in line. A guide pin was guided down to the tip of the trochanter and then driven down the intramedullary canal to the level of the lesser under direct fluoroscopic guidance in both the AP and lateral planes. Once I had the guide pin in good position, I reamed an opening and then placed the guidewire. This was guided down to the level of the knee. I measured off that. I determined that a 380 length nail  would be appropriate. I then directed my attention to the reaming process. I reamed up to an 10 millimeters where some chatter was achieved. I over-reamed to a 12 mm and elected to place an 10 x 380 mm nail. An appropriate length and diameter nail was selected for use. This was impacted down into position. The guidewire was removed. I directed my attention to placement of the proximal compression screw. A separate small incision was fashioned along the lateral aspect of the upper thigh. The guide for the pin was abutted directly adjacent to the bone and then the guide pin for the compression screw was carefully guided up into the center-center position of the femoral head taking care to maintain a tip to apex distance of as little as possible in the combined AP and lateral planes. Once we had the guide pin in good position and that was confirmed radiographically, I reamed up into the head and placed the 90 mm compression screw which seemed to get excellent purchase in the bone and seat well. The de-rotation screw was applied and maximally tightened.  Tip to apex distance was less than 25 mm.      Once I had confirmed the positioning of the hardware in both the AP and lateral planes, I then directed my attention to placement of a distal interlock screw using a perfect [place] technique and a small percutaneous incision. The distal interlock screw was drilled, measured, and placed without complication and seemed to get good purchase in the bone. It was confirmed to traverse the nail.      Having completed the placement of the hardware, final images were taken and saved for later review. I was satisfied we had an excellent reduction of the fracture. The hardware appeared to be well-positioned. I therefore directed my attention to closure. Final images were taken and saved. The wounds were copiously irrigated with sterile saline. The wounds were sequentially closed in a layered fashion using Monocryl for the deep tissues  and staples for the skin. Sterile dressings were applied to wound and drapes withdrawn. The patient was awakened, transferred to a standard bed, and taken to the recovery room. The patient tolerated the procedure well. There were no complications. The patient was taken to the recovery room in good condition.      Williams Church MD    Cc: Idalia Valiente,   12/18/2022

## 2022-12-18 NOTE — CONSULTS
ORTHO CONSULT NOTE    Patient Identification:        Name: Velma Bangura  Age: 82 y.o.  Sex: female  :  1940  MRN: [id number]                                                    HPI:        Velma Bangura is a 82 y.o. year old female who presented to the ED after a fall onto the left hip.  She was unable to bear weight on the hip after the fall.  The patient developed immediate pain in the hip after the fall.  The patient was evaulated in the ER and was found to have a hip fracture. The patient was admitted to the hospitalist service and orthopaedics was consulted for management of the fracture. The patient currently complains of pain in the hip.  No numbness or tingling.  No chest pain or shortness of breath.      Problem List:  Patient Active Problem List   Diagnosis   • Closed left hip fracture (HCC)       Past Medical History:  Past Medical History:   Diagnosis Date   • Arthritis    • Dementia (HCC)    • Depression    • Diabetes mellitus (HCC)    • Disease of thyroid gland    • Hypotension        Past Surgical History:  Past Surgical History:   Procedure Laterality Date   • BREAST LUMPECTOMY Left    • KNEE ARTHROPLASTY Left        Home Meds:  (Not in a hospital admission)      Current Meds:   Scheduled Meds:insulin lispro, 0-7 Units, Subcutaneous, TID AC      Continuous Infusions:   PRN Meds:.•  dextrose  •  dextrose  •  glucagon (human recombinant)  •  Morphine  •  [COMPLETED] Insert Peripheral IV **AND** sodium chloride    Allergies:  No Known Allergies    Social History:   Social History     Tobacco Use   • Smoking status: Never   • Smokeless tobacco: Never   Substance Use Topics   • Alcohol use: Never       Family History:  Family History   Problem Relation Age of Onset   • Arthritis Mother    • Dementia Mother    • Migraines Mother    • Cancer Father    • Heart disease Father        Review of Systems:      Negative for fever, chills, nausea, vomiting, chest pain, shortness of breath, headache,  dizziness, vision changes, or slurred speech.  All other pertinent positives and negatives as noted above in HPI.      Vitals:   Blood pressure 109/58, pulse 84, temperature 97.8 °F (36.6 °C), temperature source Oral, resp. rate 16, height 165.1 cm (65\"), weight 89.1 kg (196 lb 6.9 oz), SpO2 94 %.    I/O:     Intake/Output Summary (Last 24 hours) at 12/18/2022 0658  Last data filed at 12/18/2022 0518  Gross per 24 hour   Intake --   Output 400 ml   Net -400 ml       Physical Exam:        General - awake, alert, and oriented x 3, answers questions appropriately, well nourished, no acute distress  HEENT: atraumatic  Neck: supple  Skin: no rash  Lung: unlabored breathing  CV: palpable dp/pt pulses    left lower extremity:  Extremity held in externally rotated position  Slightly shortened compared to contralateral extremity  Tender to palpation over greater trochanter  Able to dorsiflex ankle and move toes  Pain with any IR/ER of hip  Unable to test ROM due to pain  Sensation grossly intact to light touch throughout  Distal pulses intact  No calf swelling  Estrella's sign negative  Compartments soft  No signs or symptoms of DVT or compartment syndrome    Exam of the contralateral hip is normal:  No atrophy, erythema, ecchymosis, or gross deformity noted  No tenderness to palpation  Full active range of motion  5/5 strength in hip flexion, abduction, and adduction  Stinchfield and straight leg raise negative  KODY negative  Sensation grossly intact to light tough throughout  Skin and distal pulses intact      Labs:      Lab Results (last 24 hours)     Procedure Component Value Units Date/Time    POC Glucose Once [[id number]]  (Abnormal) Collected: 12/18/22 0612    Specimen: Blood Updated: 12/18/22 0614     Glucose 235 mg/dL      Comment: Meter: [id number] : [id number] Dereck DIAS       POC Glucose Once [[id number]]  (Abnormal) Collected: 12/17/22 2109    Specimen: Blood Updated: 12/17/22 2111     Glucose 241 mg/dL       Comment: Meter: [id number] : [id number] Dereck DIAS       Comprehensive Metabolic Panel [[id number]]  (Abnormal) Collected: 12/17/22 1723    Specimen: Blood Updated: 12/17/22 1756     Glucose 248 mg/dL      BUN 15 mg/dL      Creatinine 0.90 mg/dL      Sodium 137 mmol/L      Potassium 4.6 mmol/L      Chloride 97 mmol/L      CO2 25.0 mmol/L      Calcium 9.2 mg/dL      Total Protein 6.8 g/dL      Albumin 3.80 g/dL      ALT (SGPT) 30 U/L      AST (SGOT) 30 U/L      Alkaline Phosphatase 112 U/L      Total Bilirubin 0.3 mg/dL      Globulin 3.0 gm/dL      A/G Ratio 1.3 g/dL      BUN/Creatinine Ratio 16.7     Anion Gap 15.0 mmol/L      eGFR 63.2 mL/min/1.73      Comment: National Kidney Foundation and American Society of Nephrology (ASN) Task Force recommended calculation based on the Chronic Kidney Disease Epidemiology Collaboration (CKD-EPI) equation refit without adjustment for race.       Narrative:      GFR Normal >60  Chronic Kidney Disease <60  Kidney Failure <15    The GFR formula is only valid for adults with stable renal function between ages 18 and 70.    Protime-INR [[id number]]  (Normal) Collected: 12/17/22 1723    Specimen: Blood Updated: 12/17/22 1749     Protime 13.3 Seconds      INR 1.00    aPTT [[id number]]  (Normal) Collected: 12/17/22 1723    Specimen: Blood Updated: 12/17/22 1749     PTT 25.8 seconds     CBC & Differential [[id number]]  (Abnormal) Collected: 12/17/22 1723    Specimen: Blood Updated: 12/17/22 1741    Narrative:      The following orders were created for panel order CBC & Differential.  Procedure                               Abnormality         Status                     ---------                               -----------         ------                     CBC Auto Differential[[id number]]        Abnormal            Final result                 Please view results for these tests on the individual orders.    CBC Auto Differential [[id number]]  (Abnormal) Collected: 12/17/22  1723    Specimen: Blood Updated: 12/17/22 1741     WBC 13.44 10*3/mm3      RBC 4.99 10*6/mm3      Hemoglobin 15.6 g/dL      Hematocrit 45.7 %      MCV 91.6 fL      MCH 31.3 pg      MCHC 34.1 g/dL      RDW 11.9 %      RDW-SD 40.2 fl      MPV 10.4 fL      Platelets 279 10*3/mm3      Neutrophil % 80.9 %      Lymphocyte % 11.5 %      Monocyte % 6.3 %      Eosinophil % 0.5 %      Basophil % 0.4 %      Immature Grans % 0.4 %      Neutrophils, Absolute 10.88 10*3/mm3      Lymphocytes, Absolute 1.54 10*3/mm3      Monocytes, Absolute 0.84 10*3/mm3      Eosinophils, Absolute 0.07 10*3/mm3      Basophils, Absolute 0.05 10*3/mm3      Immature Grans, Absolute 0.06 10*3/mm3      nRBC 0.0 /100 WBC           Diagnostic Studies:      AP of the pelvis and lateral of the left hip were reviewed from the ED.  There is an intertrochanteric fracture of the hip with severecomminution at the fracture site.  The fracture is in significant varus. There are no periosteal reactions or medullary lesions seen.    Comparison film not available      Assessment:     left  hip intertrochanteric fracture     Plan:      I have discussed the nature of intertrochanteric fractures with the patient and family.  This fracture will require intramedullary fixation.  The surgical procedure will be scheduled once all medical and cardiac clearances have been obtained.    The risks, benefits, and alternatives of hip fracture surgery were discussed extensively.  The risks include but are not limited to infection, DVT, PE, bleeding and blood loss, damage to nerves or blood vessels, malunion or nonunion, dislocation, continued pain, hip stiffness/loss of motion, hardware irritation, and the possibility of future arthrosis of the hip.  The risks of anesthesia including heart attack, stroke, and even death were discussed.  The typical post-operative course and rehab plan were discussed as well.  Activity restrictions following the surgery were emphasized and the  patient expressed understanding.  All the patient's questions were answered.  A consent form was signed and placed on the chart.  The patient will receive perioperative antibiotics.  The patient will be placed on DVT prophylaxis after surgery.      Williams Church MD  12/18/2022

## 2022-12-19 LAB
ALBUMIN SERPL-MCNC: 3.5 G/DL (ref 3.5–5.2)
ALBUMIN/GLOB SERPL: 1.2 G/DL
ALP SERPL-CCNC: 87 U/L (ref 39–117)
ALT SERPL W P-5'-P-CCNC: 19 U/L (ref 1–33)
ANION GAP SERPL CALCULATED.3IONS-SCNC: 9.5 MMOL/L (ref 5–15)
AST SERPL-CCNC: 20 U/L (ref 1–32)
BASOPHILS # BLD AUTO: 0.03 10*3/MM3 (ref 0–0.2)
BASOPHILS NFR BLD AUTO: 0.3 % (ref 0–1.5)
BILIRUB SERPL-MCNC: 0.4 MG/DL (ref 0–1.2)
BUN SERPL-MCNC: 18 MG/DL (ref 8–23)
BUN/CREAT SERPL: 18.6 (ref 7–25)
CALCIUM SPEC-SCNC: 9.1 MG/DL (ref 8.6–10.5)
CHLORIDE SERPL-SCNC: 92 MMOL/L (ref 98–107)
CHOLEST SERPL-MCNC: 240 MG/DL (ref 0–200)
CO2 SERPL-SCNC: 29.5 MMOL/L (ref 22–29)
CREAT SERPL-MCNC: 0.97 MG/DL (ref 0.57–1)
DEPRECATED RDW RBC AUTO: 40.9 FL (ref 37–54)
EGFRCR SERPLBLD CKD-EPI 2021: 57.7 ML/MIN/1.73
EOSINOPHIL # BLD AUTO: 0.01 10*3/MM3 (ref 0–0.4)
EOSINOPHIL NFR BLD AUTO: 0.1 % (ref 0.3–6.2)
ERYTHROCYTE [DISTWIDTH] IN BLOOD BY AUTOMATED COUNT: 11.8 % (ref 12.3–15.4)
GLOBULIN UR ELPH-MCNC: 3 GM/DL
GLUCOSE BLDC GLUCOMTR-MCNC: 175 MG/DL (ref 70–130)
GLUCOSE BLDC GLUCOMTR-MCNC: 192 MG/DL (ref 70–130)
GLUCOSE BLDC GLUCOMTR-MCNC: 205 MG/DL (ref 70–130)
GLUCOSE BLDC GLUCOMTR-MCNC: 258 MG/DL (ref 70–130)
GLUCOSE SERPL-MCNC: 213 MG/DL (ref 65–99)
HBA1C MFR BLD: 7.9 % (ref 4.8–5.6)
HCT VFR BLD AUTO: 38.1 % (ref 34–46.6)
HDLC SERPL-MCNC: 50 MG/DL (ref 40–60)
HGB BLD-MCNC: 12.4 G/DL (ref 12–15.9)
IMM GRANULOCYTES # BLD AUTO: 0.06 10*3/MM3 (ref 0–0.05)
IMM GRANULOCYTES NFR BLD AUTO: 0.6 % (ref 0–0.5)
LDLC SERPL CALC-MCNC: 156 MG/DL (ref 0–100)
LDLC/HDLC SERPL: 3.06 {RATIO}
LYMPHOCYTES # BLD AUTO: 2.38 10*3/MM3 (ref 0.7–3.1)
LYMPHOCYTES NFR BLD AUTO: 23.3 % (ref 19.6–45.3)
MCH RBC QN AUTO: 30.7 PG (ref 26.6–33)
MCHC RBC AUTO-ENTMCNC: 32.5 G/DL (ref 31.5–35.7)
MCV RBC AUTO: 94.3 FL (ref 79–97)
MONOCYTES # BLD AUTO: 1.46 10*3/MM3 (ref 0.1–0.9)
MONOCYTES NFR BLD AUTO: 14.3 % (ref 5–12)
NEUTROPHILS NFR BLD AUTO: 6.26 10*3/MM3 (ref 1.7–7)
NEUTROPHILS NFR BLD AUTO: 61.4 % (ref 42.7–76)
NRBC BLD AUTO-RTO: 0 /100 WBC (ref 0–0.2)
PLATELET # BLD AUTO: 260 10*3/MM3 (ref 140–450)
PMV BLD AUTO: 10.4 FL (ref 6–12)
POTASSIUM SERPL-SCNC: 4.3 MMOL/L (ref 3.5–5.2)
PROT SERPL-MCNC: 6.5 G/DL (ref 6–8.5)
RBC # BLD AUTO: 4.04 10*6/MM3 (ref 3.77–5.28)
SODIUM SERPL-SCNC: 131 MMOL/L (ref 136–145)
TRIGL SERPL-MCNC: 185 MG/DL (ref 0–150)
TSH SERPL DL<=0.05 MIU/L-ACNC: 2.83 UIU/ML (ref 0.27–4.2)
VLDLC SERPL-MCNC: 34 MG/DL (ref 5–40)
WBC NRBC COR # BLD: 10.2 10*3/MM3 (ref 3.4–10.8)

## 2022-12-19 PROCEDURE — 83036 HEMOGLOBIN GLYCOSYLATED A1C: CPT | Performed by: HOSPITALIST

## 2022-12-19 PROCEDURE — 97166 OT EVAL MOD COMPLEX 45 MIN: CPT

## 2022-12-19 PROCEDURE — 25010000002 CEFAZOLIN IN DEXTROSE 2-4 GM/100ML-% SOLUTION: Performed by: ORTHOPAEDIC SURGERY

## 2022-12-19 PROCEDURE — 63710000001 INSULIN LISPRO (HUMAN) PER 5 UNITS: Performed by: HOSPITALIST

## 2022-12-19 PROCEDURE — 97162 PT EVAL MOD COMPLEX 30 MIN: CPT

## 2022-12-19 PROCEDURE — 85025 COMPLETE CBC W/AUTO DIFF WBC: CPT | Performed by: HOSPITALIST

## 2022-12-19 PROCEDURE — 80053 COMPREHEN METABOLIC PANEL: CPT | Performed by: HOSPITALIST

## 2022-12-19 PROCEDURE — 97530 THERAPEUTIC ACTIVITIES: CPT

## 2022-12-19 PROCEDURE — 84443 ASSAY THYROID STIM HORMONE: CPT | Performed by: HOSPITALIST

## 2022-12-19 PROCEDURE — 80061 LIPID PANEL: CPT | Performed by: HOSPITALIST

## 2022-12-19 PROCEDURE — 82962 GLUCOSE BLOOD TEST: CPT

## 2022-12-19 RX ORDER — INSULIN LISPRO 100 [IU]/ML
5 INJECTION, SOLUTION INTRAVENOUS; SUBCUTANEOUS
Status: DISCONTINUED | OUTPATIENT
Start: 2022-12-19 | End: 2022-12-20

## 2022-12-19 RX ORDER — ASPIRIN 81 MG/1
81 TABLET ORAL DAILY
Start: 2022-12-19 | End: 2022-12-20 | Stop reason: SDUPTHER

## 2022-12-19 RX ORDER — ERGOCALCIFEROL 1.25 MG/1
50000 CAPSULE ORAL
Status: DISCONTINUED | OUTPATIENT
Start: 2022-12-19 | End: 2022-12-19

## 2022-12-19 RX ORDER — HYDROCODONE BITARTRATE AND ACETAMINOPHEN 5; 325 MG/1; MG/1
1 TABLET ORAL EVERY 4 HOURS PRN
Status: DISCONTINUED | OUTPATIENT
Start: 2022-12-19 | End: 2022-12-20 | Stop reason: HOSPADM

## 2022-12-19 RX ORDER — POLYETHYLENE GLYCOL 3350 17 G/17G
17 POWDER, FOR SOLUTION ORAL DAILY
Start: 2022-12-20

## 2022-12-19 RX ORDER — PSEUDOEPHEDRINE HCL 30 MG
100 TABLET ORAL 2 TIMES DAILY
Start: 2022-12-19

## 2022-12-19 RX ADMIN — INSULIN LISPRO 4 UNITS: 100 INJECTION, SOLUTION INTRAVENOUS; SUBCUTANEOUS at 12:26

## 2022-12-19 RX ADMIN — LEVOTHYROXINE SODIUM 50 MCG: 0.05 TABLET ORAL at 09:19

## 2022-12-19 RX ADMIN — INSULIN LISPRO 3 UNITS: 100 INJECTION, SOLUTION INTRAVENOUS; SUBCUTANEOUS at 22:36

## 2022-12-19 RX ADMIN — HYDROCODONE BITARTRATE AND ACETAMINOPHEN 1 TABLET: 5; 325 TABLET ORAL at 18:31

## 2022-12-19 RX ADMIN — HYDROCODONE BITARTRATE AND ACETAMINOPHEN 1 TABLET: 5; 325 TABLET ORAL at 22:38

## 2022-12-19 RX ADMIN — DOCUSATE SODIUM 100 MG: 100 CAPSULE, LIQUID FILLED ORAL at 09:18

## 2022-12-19 RX ADMIN — ASPIRIN 81 MG: 81 TABLET, COATED ORAL at 21:23

## 2022-12-19 RX ADMIN — INSULIN LISPRO 5 UNITS: 100 INJECTION, SOLUTION INTRAVENOUS; SUBCUTANEOUS at 18:32

## 2022-12-19 RX ADMIN — POLYETHYLENE GLYCOL 3350 17 G: 17 POWDER, FOR SOLUTION ORAL at 09:18

## 2022-12-19 RX ADMIN — SERTRALINE 25 MG: 25 TABLET, FILM COATED ORAL at 21:24

## 2022-12-19 RX ADMIN — HYDROCODONE BITARTRATE AND ACETAMINOPHEN 1 TABLET: 5; 325 TABLET ORAL at 06:27

## 2022-12-19 RX ADMIN — MEMANTINE HYDROCHLORIDE 10 MG: 10 TABLET, FILM COATED ORAL at 21:24

## 2022-12-19 RX ADMIN — ATORVASTATIN CALCIUM 10 MG: 10 TABLET, FILM COATED ORAL at 21:24

## 2022-12-19 RX ADMIN — HYDROCODONE BITARTRATE AND ACETAMINOPHEN 1 TABLET: 5; 325 TABLET ORAL at 12:26

## 2022-12-19 RX ADMIN — INSULIN LISPRO 2 UNITS: 100 INJECTION, SOLUTION INTRAVENOUS; SUBCUTANEOUS at 18:32

## 2022-12-19 RX ADMIN — INSULIN LISPRO 2 UNITS: 100 INJECTION, SOLUTION INTRAVENOUS; SUBCUTANEOUS at 09:19

## 2022-12-19 RX ADMIN — CEFAZOLIN SODIUM 2 G: 2 INJECTION, SOLUTION INTRAVENOUS at 02:55

## 2022-12-19 RX ADMIN — Medication 250 MG: at 09:18

## 2022-12-19 RX ADMIN — ASPIRIN 81 MG: 81 TABLET, COATED ORAL at 09:18

## 2022-12-19 RX ADMIN — INSULIN GLARGINE-YFGN 15 UNITS: 100 INJECTION, SOLUTION SUBCUTANEOUS at 22:37

## 2022-12-19 NOTE — PROGRESS NOTES
Orthopedic Progress Note      Patient: Velma Bangura    YOB: 1940    Medical Record Number: [id number]    Attending Physician: Rafael Acharya MD    Date of Admission: 12/17/2022  2:44 PM    Admitting Dx:  Closed left hip fracture (HCC) [S72.002A]  Closed fracture of left hip, initial encounter (HCC) [S72.002A]  Contusion of left knee, initial encounter [S80.02XA]    Status Post: Cephalomedullary fixation of left intertrochanteric femur fracture        Post Operative Day Number: 1    Current Problem List:   Patient Active Problem List   Diagnosis    Closed left hip fracture (HCC)         Past Medical History:   Diagnosis Date    Arthritis     Dementia (HCC)     Depression     Diabetes mellitus (HCC)     Disease of thyroid gland     Hypotension     PONV (postoperative nausea and vomiting)        Current Medications:  Scheduled Meds:aspirin, 81 mg, Oral, Q12H  atorvastatin, 10 mg, Oral, Nightly  insulin glargine, 10 Units, Subcutaneous, Nightly  insulin lispro, 0-7 Units, Subcutaneous, 4x Daily With Meals & Nightly  levothyroxine, 50 mcg, Oral, Daily  memantine, 10 mg, Oral, Nightly  pantoprazole, 40 mg, Oral, Q AM  polyethylene glycol, 17 g, Oral, Daily  saccharomyces boulardii, 250 mg, Oral, Daily  sertraline, 25 mg, Oral, Nightly      PRN Meds:.  acetaminophen    docusate sodium    HYDROcodone-acetaminophen    HYDROmorphone **AND** naloxone    ondansetron **OR** ondansetron    [COMPLETED] Insert Peripheral IV **AND** sodium chloride    sodium chloride    sodium chloride    SUBJECTIVE: 82 y.o.  female patient is awake and follows commands however she does not remember that she even had surgery     OBJECTIVE:   Vitals:    12/18/22 2104 12/19/22 0311 12/19/22 0637 12/19/22 1012   BP: 111/69 120/73 108/74 107/57   BP Location: Right arm Right arm Right arm Left arm   Patient Position: Lying Lying Lying Lying   Pulse: 85 77 81 81   Resp: 16 16 16 16   Temp: 98.4 °F (36.9 °C) 97.6 °F (36.4 °C) 97.4 °F  (36.3 °C) 97.2 °F (36.2 °C)   TempSrc: Oral Oral Oral Oral   SpO2: 93% 93% 95% 92%   Weight:       Height:         I/O last 3 completed shifts:  In: 1580 [P.O.:580; I.V.:1000]  Out: 700 [Urine:700]    Diagnostic Tests:  Lab Results (last 72 hours)       Procedure Component Value Units Date/Time    POC Glucose Once [[id number]]  (Abnormal) Collected: 12/19/22 1039    Specimen: Blood Updated: 12/19/22 1040     Glucose 258 mg/dL      Comment: Meter: [id number] : [id number] Nelly Funes PCA       Comprehensive Metabolic Panel [[id number]]  (Abnormal) Collected: 12/19/22 0511    Specimen: Blood Updated: 12/19/22 0637     Glucose 213 mg/dL      BUN 18 mg/dL      Creatinine 0.97 mg/dL      Sodium 131 mmol/L      Potassium 4.3 mmol/L      Chloride 92 mmol/L      CO2 29.5 mmol/L      Calcium 9.1 mg/dL      Total Protein 6.5 g/dL      Albumin 3.50 g/dL      ALT (SGPT) 19 U/L      AST (SGOT) 20 U/L      Alkaline Phosphatase 87 U/L      Total Bilirubin 0.4 mg/dL      Globulin 3.0 gm/dL      A/G Ratio 1.2 g/dL      BUN/Creatinine Ratio 18.6     Anion Gap 9.5 mmol/L      eGFR 57.7 mL/min/1.73      Comment: National Kidney Foundation and American Society of Nephrology (ASN) Task Force recommended calculation based on the Chronic Kidney Disease Epidemiology Collaboration (CKD-EPI) equation refit without adjustment for race.       Narrative:      GFR Normal >60  Chronic Kidney Disease <60  Kidney Failure <15    The GFR formula is only valid for adults with stable renal function between ages 18 and 70.    Lipid Panel [[id number]]  (Abnormal) Collected: 12/19/22 0511    Specimen: Blood Updated: 12/19/22 0635     Total Cholesterol 240 mg/dL      Triglycerides 185 mg/dL      HDL Cholesterol 50 mg/dL      LDL Cholesterol  156 mg/dL      VLDL Cholesterol 34 mg/dL      LDL/HDL Ratio 3.06    Narrative:      Cholesterol Reference Ranges  (U.S. Department of Health and Human Services ATP III Classifications)    Desirable          <200  mg/dL  Borderline High    200-239 mg/dL  High Risk          >240 mg/dL      Triglyceride Reference Ranges  (U.S. Department of Health and Human Services ATP III Classifications)    Normal           <150 mg/dL  Borderline High  150-199 mg/dL  High             200-499 mg/dL  Very High        >500 mg/dL    HDL Reference Ranges  (U.S. Department of Health and Human Services ATP III Classifications)    Low     <40 mg/dl (major risk factor for CHD)  High    >60 mg/dl ('negative' risk factor for CHD)        LDL Reference Ranges  (U.S. Department of Health and Human Services ATP III Classifications)    Optimal          <100 mg/dL  Near Optimal     100-129 mg/dL  Borderline High  130-159 mg/dL  High             160-189 mg/dL  Very High        >189 mg/dL    POC Glucose Once [[id number]]  (Abnormal) Collected: 12/19/22 0631    Specimen: Blood Updated: 12/19/22 0632     Glucose 192 mg/dL      Comment: Meter: [id number] : [id number] Shelbie DIAS       CBC & Differential [[id number]]  (Abnormal) Collected: 12/19/22 0511    Specimen: Blood Updated: 12/19/22 0614    Narrative:      The following orders were created for panel order CBC & Differential.  Procedure                               Abnormality         Status                     ---------                               -----------         ------                     CBC Auto Differential[[id number]]        Abnormal            Final result                 Please view results for these tests on the individual orders.    CBC Auto Differential [[id number]]  (Abnormal) Collected: 12/19/22 0511    Specimen: Blood Updated: 12/19/22 0614     WBC 10.20 10*3/mm3      RBC 4.04 10*6/mm3      Hemoglobin 12.4 g/dL      Hematocrit 38.1 %      MCV 94.3 fL      MCH 30.7 pg      MCHC 32.5 g/dL      RDW 11.8 %      RDW-SD 40.9 fl      MPV 10.4 fL      Platelets 260 10*3/mm3      Neutrophil % 61.4 %      Lymphocyte % 23.3 %      Monocyte % 14.3 %      Eosinophil % 0.1 %      Basophil % 0.3  %      Immature Grans % 0.6 %      Neutrophils, Absolute 6.26 10*3/mm3      Lymphocytes, Absolute 2.38 10*3/mm3      Monocytes, Absolute 1.46 10*3/mm3      Eosinophils, Absolute 0.01 10*3/mm3      Basophils, Absolute 0.03 10*3/mm3      Immature Grans, Absolute 0.06 10*3/mm3      nRBC 0.0 /100 WBC     TSH [[id number]]  (Normal) Collected: 12/19/22 0511    Specimen: Blood Updated: 12/19/22 0607     TSH 2.830 uIU/mL     Hemoglobin A1c [[id number]]  (Abnormal) Collected: 12/19/22 0511    Specimen: Blood Updated: 12/19/22 0554     Hemoglobin A1C 7.90 %     Narrative:      Hemoglobin A1C Ranges:    Increased Risk for Diabetes  5.7% to 6.4%  Diabetes                     >= 6.5%  Diabetic Goal                < 7.0%    POC Glucose Once [[id number]]  (Abnormal) Collected: 12/18/22 2122    Specimen: Blood Updated: 12/18/22 2123     Glucose 263 mg/dL      Comment: Meter: [id number] : [id number] Danielle DIAS       Vitamin D,25-Hydroxy [[id number]]  (Normal) Collected: 12/18/22 1536    Specimen: Blood from Arm, Left Updated: 12/18/22 1710     25 Hydroxy, Vitamin D 40.8 ng/ml     Narrative:      Reference Range for Total Vitamin D 25(OH)     Deficiency <20.0 ng/mL   Insufficiency 21-29 ng/mL   Sufficiency  ng/mL  Toxicity >100 ng/ml    Results may be falsely increased if patient taking Biotin.      POC Glucose Once [[id number]]  (Abnormal) Collected: 12/18/22 1705    Specimen: Blood Updated: 12/18/22 1706     Glucose 287 mg/dL      Comment: Meter: [id number] : [id number] Imani Johnson RN       BNP [[id number]]  (Normal) Collected: 12/18/22 1536    Specimen: Blood from Arm, Left Updated: 12/18/22 1647     proBNP 47.1 pg/mL     Narrative:      Among patients with dyspnea, NT-proBNP is highly sensitive for the detection of acute congestive heart failure. In addition NT-proBNP of <300 pg/ml effectively rules out acute congestive heart failure with 99% negative predictive value.    Results may be falsely decreased if  patient taking Biotin.      POC Glucose Once [[id number]]  (Abnormal) Collected: 12/18/22 1232    Specimen: Blood Updated: 12/18/22 1233     Glucose 258 mg/dL      Comment: Meter: [id number] : [id number] Dimas Adams RN       POC Glucose Once [[id number]]  (Abnormal) Collected: 12/18/22 0612    Specimen: Blood Updated: 12/18/22 0614     Glucose 235 mg/dL      Comment: Meter: [id number] : [id number] Dereck DIAS       POC Glucose Once [[id number]]  (Abnormal) Collected: 12/17/22 2109    Specimen: Blood Updated: 12/17/22 2111     Glucose 241 mg/dL      Comment: Meter: [id number] : [id number] Dereck DIAS       Comprehensive Metabolic Panel [[id number]]  (Abnormal) Collected: 12/17/22 1723    Specimen: Blood Updated: 12/17/22 1756     Glucose 248 mg/dL      BUN 15 mg/dL      Creatinine 0.90 mg/dL      Sodium 137 mmol/L      Potassium 4.6 mmol/L      Chloride 97 mmol/L      CO2 25.0 mmol/L      Calcium 9.2 mg/dL      Total Protein 6.8 g/dL      Albumin 3.80 g/dL      ALT (SGPT) 30 U/L      AST (SGOT) 30 U/L      Alkaline Phosphatase 112 U/L      Total Bilirubin 0.3 mg/dL      Globulin 3.0 gm/dL      A/G Ratio 1.3 g/dL      BUN/Creatinine Ratio 16.7     Anion Gap 15.0 mmol/L      eGFR 63.2 mL/min/1.73      Comment: National Kidney Foundation and American Society of Nephrology (ASN) Task Force recommended calculation based on the Chronic Kidney Disease Epidemiology Collaboration (CKD-EPI) equation refit without adjustment for race.       Narrative:      GFR Normal >60  Chronic Kidney Disease <60  Kidney Failure <15    The GFR formula is only valid for adults with stable renal function between ages 18 and 70.    Protime-INR [[id number]]  (Normal) Collected: 12/17/22 1723    Specimen: Blood Updated: 12/17/22 1749     Protime 13.3 Seconds      INR 1.00    aPTT [[id number]]  (Normal) Collected: 12/17/22 1723    Specimen: Blood Updated: 12/17/22 1749     PTT 25.8 seconds     CBC & Differential [[id number]]   (Abnormal) Collected: 12/17/22 1723    Specimen: Blood Updated: 12/17/22 1741    Narrative:      The following orders were created for panel order CBC & Differential.  Procedure                               Abnormality         Status                     ---------                               -----------         ------                     CBC Auto Differential[[id number]]        Abnormal            Final result                 Please view results for these tests on the individual orders.    CBC Auto Differential [[id number]]  (Abnormal) Collected: 12/17/22 1723    Specimen: Blood Updated: 12/17/22 1741     WBC 13.44 10*3/mm3      RBC 4.99 10*6/mm3      Hemoglobin 15.6 g/dL      Hematocrit 45.7 %      MCV 91.6 fL      MCH 31.3 pg      MCHC 34.1 g/dL      RDW 11.9 %      RDW-SD 40.2 fl      MPV 10.4 fL      Platelets 279 10*3/mm3      Neutrophil % 80.9 %      Lymphocyte % 11.5 %      Monocyte % 6.3 %      Eosinophil % 0.5 %      Basophil % 0.4 %      Immature Grans % 0.4 %      Neutrophils, Absolute 10.88 10*3/mm3      Lymphocytes, Absolute 1.54 10*3/mm3      Monocytes, Absolute 0.84 10*3/mm3      Eosinophils, Absolute 0.07 10*3/mm3      Basophils, Absolute 0.05 10*3/mm3      Immature Grans, Absolute 0.06 10*3/mm3      nRBC 0.0 /100 WBC              PHYSICAL EXAM: Left hip and thigh dressings are dry and intact.  Calf is soft and nontender.  She has good motion sensation to her foot and ankle.  Appears comfortable in bed.  She does complain of pain as expected with any repositioning.  PT to see today.  Patient will need to be strict 50% partial weightbearing on that left lower extremity.  Vital signs remained stable.  Hemoglobin is 12.4.  She is oriented to name only.  Very pleasant and cooperative.  Voiding via pure wick.     ASSESSMENT & PLAN:  Aspirin for DVT prophylaxis.  Continue to try to mobilize patient with PT as she can tolerate.  Patient is to remain strict 50% partial weightbearing on that left lower  extremity  Patient does live in a facility.  She will need to return to the skilled nursing facility for continued PT once she is ready for discharge.      Date: 12/19/2022    Gabby Og RN    I saw and evaluated the patient as well.  Patient was resting well and seen this morning she did have pain overnight but when seen pain has been controlled.  I do agree with the above findings.  Orthopedic discharge instructions have been placed.  Patient would need to follow-up in 2 weeks.  Please call any questions or concerns thank you

## 2022-12-19 NOTE — PROGRESS NOTES
Daily progress note    Primary care physician      Chief complaint  S/p cephalomedullary fixation of left intertrochanteric femur fracture.  Patient is awake and alert with no new complaints.  Patient tolerating diet and using pain medication.      History of present illness  82-year-old white female with history of severe dementia diabetes mellitus hypertension hypothyroidism and severe osteoarthritis who is a nursing home resident brought to the emergency room after the mechanical fall with left knee pain.  Patient evaluated in ER found to have left hip fracture admit for management.  At the time of interview patient is awake and alert in mild distress secondary to pain and unable to give detailed history and most of the history obtained from the family.  Patient apparently did not lose consciousness or hit her head and denies any chest pain shortness of breath palpitation dizziness lightheadedness prior to the fall to the nursing staff but feels weak.  Patient is DNR per her wishes      REVIEW OF SYSTEMS  Limited due to dementia    PHYSICAL EXAM  Blood pressure 110/62, pulse 85, temperature 98.9 °F (37.2 °C), temperature source Oral, resp. rate 16, height 165.1 cm (65\"), weight 89.1 kg (196 lb 6.9 oz), SpO2 90 %.    GENERAL: Awake and alert, mild distress  HEENT:  Unremarkable  NECK:  Supple  CV: regular rhythm, normal rate, distal pulses symmetric and palpable  RESPIRATORY: normal effort moving air bilaterally  ABDOMEN: soft, nondistended nontender with normal bowel sound  MUSCULOSKELETAL: no deformity.  No obvious deformity, but the leg is held in flexion due to discomfort.  There is tenderness palpation of the hip and knee but no obvious deformity and significant pain with range of motion especially at the hip.    NEURO: alert, moves all extremities, follows commands  PSYCH:  calm, cooperative  SKIN: warm, dry with no rash     LAB RESULTS  Lab Results (last 24 hours)     Procedure Component Value  Units Date/Time    POC Glucose Once [[id number]]  (Abnormal) Collected: 12/19/22 1039    Specimen: Blood Updated: 12/19/22 1040     Glucose 258 mg/dL      Comment: Meter: [id number] : [id number] Nelly Shantel PCA       Comprehensive Metabolic Panel [[id number]]  (Abnormal) Collected: 12/19/22 0511    Specimen: Blood Updated: 12/19/22 0637     Glucose 213 mg/dL      BUN 18 mg/dL      Creatinine 0.97 mg/dL      Sodium 131 mmol/L      Potassium 4.3 mmol/L      Chloride 92 mmol/L      CO2 29.5 mmol/L      Calcium 9.1 mg/dL      Total Protein 6.5 g/dL      Albumin 3.50 g/dL      ALT (SGPT) 19 U/L      AST (SGOT) 20 U/L      Alkaline Phosphatase 87 U/L      Total Bilirubin 0.4 mg/dL      Globulin 3.0 gm/dL      A/G Ratio 1.2 g/dL      BUN/Creatinine Ratio 18.6     Anion Gap 9.5 mmol/L      eGFR 57.7 mL/min/1.73      Comment: National Kidney Foundation and American Society of Nephrology (ASN) Task Force recommended calculation based on the Chronic Kidney Disease Epidemiology Collaboration (CKD-EPI) equation refit without adjustment for race.       Narrative:      GFR Normal >60  Chronic Kidney Disease <60  Kidney Failure <15    The GFR formula is only valid for adults with stable renal function between ages 18 and 70.    Lipid Panel [[id number]]  (Abnormal) Collected: 12/19/22 0511    Specimen: Blood Updated: 12/19/22 0635     Total Cholesterol 240 mg/dL      Triglycerides 185 mg/dL      HDL Cholesterol 50 mg/dL      LDL Cholesterol  156 mg/dL      VLDL Cholesterol 34 mg/dL      LDL/HDL Ratio 3.06    Narrative:      Cholesterol Reference Ranges  (U.S. Department of Health and Human Services ATP III Classifications)    Desirable          <200 mg/dL  Borderline High    200-239 mg/dL  High Risk          >240 mg/dL      Triglyceride Reference Ranges  (U.S. Department of Health and Human Services ATP III Classifications)    Normal           <150 mg/dL  Borderline High  150-199 mg/dL  High             200-499 mg/dL  Very  High        >500 mg/dL    HDL Reference Ranges  (U.S. Department of Health and Human Services ATP III Classifications)    Low     <40 mg/dl (major risk factor for CHD)  High    >60 mg/dl ('negative' risk factor for CHD)        LDL Reference Ranges  (U.S. Department of Health and Human Services ATP III Classifications)    Optimal          <100 mg/dL  Near Optimal     100-129 mg/dL  Borderline High  130-159 mg/dL  High             160-189 mg/dL  Very High        >189 mg/dL    POC Glucose Once [[id number]]  (Abnormal) Collected: 12/19/22 0631    Specimen: Blood Updated: 12/19/22 0632     Glucose 192 mg/dL      Comment: Meter: [id number] : [id number] Shelbie DIAS       CBC & Differential [[id number]]  (Abnormal) Collected: 12/19/22 0511    Specimen: Blood Updated: 12/19/22 0614    Narrative:      The following orders were created for panel order CBC & Differential.  Procedure                               Abnormality         Status                     ---------                               -----------         ------                     CBC Auto Differential[[id number]]        Abnormal            Final result                 Please view results for these tests on the individual orders.    CBC Auto Differential [[id number]]  (Abnormal) Collected: 12/19/22 0511    Specimen: Blood Updated: 12/19/22 0614     WBC 10.20 10*3/mm3      RBC 4.04 10*6/mm3      Hemoglobin 12.4 g/dL      Hematocrit 38.1 %      MCV 94.3 fL      MCH 30.7 pg      MCHC 32.5 g/dL      RDW 11.8 %      RDW-SD 40.9 fl      MPV 10.4 fL      Platelets 260 10*3/mm3      Neutrophil % 61.4 %      Lymphocyte % 23.3 %      Monocyte % 14.3 %      Eosinophil % 0.1 %      Basophil % 0.3 %      Immature Grans % 0.6 %      Neutrophils, Absolute 6.26 10*3/mm3      Lymphocytes, Absolute 2.38 10*3/mm3      Monocytes, Absolute 1.46 10*3/mm3      Eosinophils, Absolute 0.01 10*3/mm3      Basophils, Absolute 0.03 10*3/mm3      Immature Grans, Absolute 0.06 10*3/mm3       nRBC 0.0 /100 WBC     TSH [[id number]]  (Normal) Collected: 12/19/22 0511    Specimen: Blood Updated: 12/19/22 0607     TSH 2.830 uIU/mL     Hemoglobin A1c [[id number]]  (Abnormal) Collected: 12/19/22 0511    Specimen: Blood Updated: 12/19/22 0554     Hemoglobin A1C 7.90 %     Narrative:      Hemoglobin A1C Ranges:    Increased Risk for Diabetes  5.7% to 6.4%  Diabetes                     >= 6.5%  Diabetic Goal                < 7.0%    POC Glucose Once [[id number]]  (Abnormal) Collected: 12/18/22 2122    Specimen: Blood Updated: 12/18/22 2123     Glucose 263 mg/dL      Comment: Meter: [id number] : [id number] Danielle DIAS       Vitamin D,25-Hydroxy [[id number]]  (Normal) Collected: 12/18/22 1536    Specimen: Blood from Arm, Left Updated: 12/18/22 1710     25 Hydroxy, Vitamin D 40.8 ng/ml     Narrative:      Reference Range for Total Vitamin D 25(OH)     Deficiency <20.0 ng/mL   Insufficiency 21-29 ng/mL   Sufficiency  ng/mL  Toxicity >100 ng/ml    Results may be falsely increased if patient taking Biotin.      POC Glucose Once [[id number]]  (Abnormal) Collected: 12/18/22 1705    Specimen: Blood Updated: 12/18/22 1706     Glucose 287 mg/dL      Comment: Meter: [id number] : [id number] Imani Johnson RN       BNP [[id number]]  (Normal) Collected: 12/18/22 1536    Specimen: Blood from Arm, Left Updated: 12/18/22 1647     proBNP 47.1 pg/mL     Narrative:      Among patients with dyspnea, NT-proBNP is highly sensitive for the detection of acute congestive heart failure. In addition NT-proBNP of <300 pg/ml effectively rules out acute congestive heart failure with 99% negative predictive value.    Results may be falsely decreased if patient taking Biotin.          Imaging Results (Last 24 Hours)     ** No results found for the last 24 hours. **           ECG 12 Lead   Component   Ref Range & Units 1 d ago    QT Interval   ms 381    Resulting Agency  ECG             HEART RATE= 86  bpm  RR Interval= 698  ms  NM  Interval= 166  ms  P Horizontal Axis= -11  deg  P Front Axis= 46  deg  QRSD Interval= 99  ms  QT Interval= 381  ms  QRS Axis= 25  deg  T Wave Axis= 50  deg  - BORDERLINE ECG -  Sinus rhythm  Probable left atrial enlargement  Low voltage, precordial leads  No Prior Tracing for Comparison               Current Facility-Administered Medications:   •  acetaminophen (TYLENOL) tablet 325 mg, 325 mg, Oral, Q4H PRN, Williams Church MD  •  aspirin EC tablet 81 mg, 81 mg, Oral, Q12H, Williams Church MD, 81 mg at 12/19/22 0918  •  atorvastatin (LIPITOR) tablet 10 mg, 10 mg, Oral, Nightly, Rafael Acharya MD, 10 mg at 12/18/22 2116  •  docusate sodium (COLACE) capsule 100 mg, 100 mg, Oral, BID PRN, Williams Church MD, 100 mg at 12/19/22 0918  •  HYDROcodone-acetaminophen (NORCO) 5-325 MG per tablet 1 tablet, 1 tablet, Oral, Q4H PRN, Rafael Acharya MD  •  insulin glargine (LANTUS, SEMGLEE) injection 10 Units, 10 Units, Subcutaneous, Nightly, Rafael Acharya MD, 10 Units at 12/18/22 2129  •  insulin lispro (ADMELOG) injection 0-7 Units, 0-7 Units, Subcutaneous, 4x Daily With Meals & Nightly, Rafael Acharya MD, 4 Units at 12/19/22 1226  •  lactated ringers infusion, 9 mL/hr, Intravenous, Continuous, Williams Church MD, Stopped at 12/18/22 1920  •  levothyroxine (SYNTHROID, LEVOTHROID) tablet 50 mcg, 50 mcg, Oral, Daily, Rafael Acharya MD, 50 mcg at 12/19/22 0919  •  memantine (NAMENDA) tablet 10 mg, 10 mg, Oral, Nightly, Rafael Acharya MD, 10 mg at 12/18/22 2116  •  [DISCONTINUED] ondansetron (ZOFRAN) tablet 4 mg, 4 mg, Oral, Q6H PRN **OR** ondansetron (ZOFRAN) injection 4 mg, 4 mg, Intravenous, Q6H PRN, Williams Church MD  •  pantoprazole (PROTONIX) EC tablet 40 mg, 40 mg, Oral, Q AM, Rafael Acharya MD  •  polyethylene glycol (MIRALAX) packet 17 g, 17 g, Oral, Daily, Williams Church MD, 17 g at 12/19/22 0918  •  saccharomyces boulardii (FLORASTOR) capsule 250 mg, 250 mg, Oral, Daily, Rafael Acharya MD, 250 mg at 12/19/22  0918  •  sertraline (ZOLOFT) tablet 25 mg, 25 mg, Oral, Nightly, Katia Acharya MD, 25 mg at 12/18/22 2116  •  [COMPLETED] Insert Peripheral IV, , , Once **AND** sodium chloride 0.9 % flush 10 mL, 10 mL, Intravenous, PRN, Williams Church MD  •  sodium chloride 0.9 % flush 10 mL, 10 mL, Intravenous, PRN, Williams hCurch MD  •  sodium chloride 0.9 % infusion 40 mL, 40 mL, Intravenous, PRN, Williams Church MD  •  vitamin D (ERGOCALCIFEROL) capsule 50,000 Units, 50,000 Units, Oral, Q7 Days, Williams Church MD     ASSESSMENT  Left intertrochanteric femur fracture s/p cephalomedullary fixation  Severe dementia  Diabetes mellitus  Hypertension  Hypothyroidism  Osteoarthritis  Depression  Gastroesophageal reflux disease    PLAN  CPM  Postop care  Pain management  Orthopedic surgery consult appreciated  Adjust nursing home medications  Stress ulcer DVT prophylaxis  Supportive care  DNR  PT/OT  Discussed with family and nursing staff  Follow closely further recommendation current hospital course    KATIA ACHARYA MD

## 2022-12-19 NOTE — CASE MANAGEMENT/SOCIAL WORK
Discharge Planning Assessment  [facility]     Patient Name: Velma Bangura  MRN: [id number]  Today's Date: 12/19/2022    Admit Date: 12/17/2022    Plan: SNF -- Referrals Pending.   Discharge Needs Assessment     Row Name 12/19/22 1836       Living Environment    People in Home facility resident    Unique Family Situation [facility].    Current Living Arrangements residential facility    Primary Care Provided by self;other (see comments)  Nursing staff at [facility].    Provides Primary Care For no one, unable/limited ability to care for self;no one    Family Caregiver if Needed child(temi), adult    Quality of Family Relationships helpful;involved;supportive    Able to Return to Prior Arrangements other (see comments)  Plans SNF.       Resource/Environmental Concerns    Transportation Concerns none       Transition Planning    Patient/Family Anticipates Transition to inpatient rehabilitation facility    Patient/Family Anticipated Services at Transition     Transportation Anticipated health plan transportation       Discharge Needs Assessment    Readmission Within the Last 30 Days no previous admission in last 30 days    Equipment Currently Used at Home walker, rolling;wheelchair    Discharge Facility/Level of Care Needs nursing facility, skilled    Provided Post Acute Provider List? Yes    Post Acute Provider List Nursing Home  SNF.    Patient's Choice of Community Agency(s) [facility], [facility], [facility] and [facility].               Discharge Plan     Row Name 12/19/22 8597       Plan    Plan SNF -- Referrals Pending.    Patient/Family in Agreement with Plan yes    Plan Comments Per Epic documentation, the patient is not fully alert/oriented at this time. Therefore CCP spoke with her healthcare surrogate & son/Ayden, verified current information and explained the role of the CCP. Patient lives at [facility] in Personal Care.  She's overall dependent on the nursing staff at [facility] to asssist with her ADLs. She uses a walker and wheelchair regularly. She's been to rehab in [place] and has no history with . Ayden plans for the patient to d/c to SNF. CMS Compare SNF List was reviewed and he requests referrals to [facility], [facility], [facility] and the [facility]. Referrals sent in [facility]. [facility] will follow.              Continued Care and Services - Admitted Since 12/17/2022     Destination     Service Provider Request Status Selected Services Address Phone Fax Patient Preferred    [facility] Pending - Request Sent N/A [address] [address] [phone] --    [facility] Pending - Request Sent N/A [address] [address] [phone] --    [facility] Pending - Request Sent N/A [address] [address] [phone] --    [facility] Pending - Request Sent N/A [address] [address] [phone] --    [facility] Pending - Request Sent N/A [address] [address] [phone] --                 Demographic Summary     Row Name 12/19/22 1835       General Information    Admission Type inpatient    Reason for Consult discharge planning    Preferred Language English       Contact Information    Permission Granted to Share Info With ;family/designee               Functional Status     Row Name 12/19/22 1836       Functional Status    Usual Activity Tolerance fair       Functional Status, IADL    Medications assistive person    Meal Preparation assistive equipment and person    Housekeeping assistive equipment and person    Laundry assistive equipment and person    Shopping assistive equipment and person                Shantel JOLLY RN

## 2022-12-19 NOTE — PLAN OF CARE
Goal Outcome Evaluation:  Plan of Care Reviewed With: patient           Outcome Evaluation: Pt is a 81 y/o F admitted to  [facility] after a fall now POD 1 s/p cephalomedullary fixation of left intertrochanteric femur fracture. Pt is 50% WB on L LE. Pt has a med hx of severe dementia, DM, HTN, hypothyroidism, and severe osteoarthritis. Pt received in bed upon arrival and agreeable to PT/OT eval. Pt AxOx1 and presents with short-term memory loss. PLOF limited this visit secondary to cognitive status but was residing at nursing home and used a RW for mobility. Pt presents to PT with post-op pain and weakness, decreased endurance, and impaired functional mobility. Pt required max A x 2 for bed mobility tasks. Pt attempted to stand c RW requiring max A x 2 but unable to maintain WB'ing precautions regardless of verbal and tactile cues for body mehcanics and restrictions. Bed to chair transfer deferred secondary to poor standing tolerance and weakness. Pt retuned to supine with all needs in reach. PT recommends SNF at D/C to address stated deficits.

## 2022-12-19 NOTE — THERAPY EVALUATION
Patient Name: Velma Bangura  : 1940    MRN: [id number]                              Today's Date: 2022       Admit Date: 2022    Visit Dx:     ICD-10-CM ICD-9-CM   1. Closed fracture of left hip, initial encounter ([facility])  S72.002A 820.8   2. Contusion of left knee, initial encounter  S80.02XA 924.11     Patient Active Problem List   Diagnosis   • Closed left hip fracture (HCC)     Past Medical History:   Diagnosis Date   • Arthritis    • Dementia (HCC)    • Depression    • Diabetes mellitus (HCC)    • Disease of thyroid gland    • Hypotension    • PONV (postoperative nausea and vomiting)      Past Surgical History:   Procedure Laterality Date   • BREAST LUMPECTOMY Left    • HIP INTERTROCHANTERIC NAILING Left 2022    Procedure: HIP INTERTROCHANTERIC NAILING;  Surgeon: Williams Church MD;  Location: [facility];  Service: Orthopedics;  Laterality: Left;   • KNEE ARTHROPLASTY Left       General Information     Row Name 22 0954          Physical Therapy Time and Intention    Document Type evaluation  -CS     Mode of Treatment co-treatment;physical therapy;occupational therapy  -CS     Row Name 22 0954          General Information    Patient Profile Reviewed yes  -CS     Prior Level of Function all household mobility;gait;transfer;bed mobility  PLOF limited secondary to cognitive status - used RW for mobility  -CS     Existing Precautions/Restrictions fall;left;partial weight bearing  L LE 50% WB  -CS     Barriers to Rehab medically complex;cognitive status  -CS     Row Name 22 0954          Living Environment    People in Home facility resident  -CS     Row Name 22 0954          Home Main Entrance    Number of Stairs, Main Entrance none  -CS     Row Name 22 0954          Stairs Within Home, Primary    Number of Stairs, Within Home, Primary none  -CS     Row Name 22 0954          Cognition    Orientation Status (Cognition) oriented to;person;verbal  cues/prompts needed for orientation  hx of dementia  -CS     Row Name 12/19/22 0954          Safety Issues, Functional Mobility    Safety Issues Affecting Function (Mobility) insight into deficits/self-awareness;positioning of assistive device;safety precaution awareness;safety precautions follow-through/compliance  -CS     Impairments Affecting Function (Mobility) balance;cognition;endurance/activity tolerance;pain;range of motion (ROM);strength  -CS           User Key  (r) = Recorded By, (t) = Taken By, (c) = Cosigned By    Initials Name Provider Type    CS Ju Albarado, PT Physical Therapist               Mobility     Row Name 12/19/22 0956          Bed Mobility    Bed Mobility supine-sit;sit-supine  -CS     Supine-Sit [place] (Bed Mobility) maximum assist (25% patient effort);2 person assist;verbal cues;nonverbal cues (demo/gesture)  -CS     Sit-Supine [place] (Bed Mobility) maximum assist (25% patient effort);2 person assist;verbal cues;nonverbal cues (demo/gesture)  -CS     Assistive Device (Bed Mobility) draw sheet;head of bed elevated  -CS     Comment, (Bed Mobility) heavy cues for initiation - no active movement observed in L LE secondary to pain  -CS     Row Name 12/19/22 0956          Transfers    Comment, (Transfers) deferred secondary to poor standing tolerance, weakness, and pain  -CS     Row Name 12/19/22 0956          Bed-Chair Transfer    Bed-Chair [place] (Transfers) unable to assess  -     Row Name 12/19/22 0956          Sit-Stand Transfer    Sit-Stand [place] (Transfers) maximum assist (25% patient effort);2 person assist;verbal cues;nonverbal cues (demo/gesture)  -CS     Assistive Device (Sit-Stand Transfers) walker, front-wheeled  -CS     Comment, (Sit-Stand Transfer) unable to maintain WB'ing precautions  -CS     Row Name 12/19/22 0956          Gait/Stairs (Locomotion)    [place] Level (Gait) unable to assess  -     Row Name 12/19/22 0956          Mobility     Extremity Weight-bearing Status left lower extremity  -CS     Left Lower Extremity (Weight-bearing Status) partial weight-bearing (PWB)  50%  -CS           User Key  (r) = Recorded By, (t) = Taken By, (c) = Cosigned By    Initials Name Provider Type    Ju Brush PT Physical Therapist               Obj/Interventions     Row Name 12/19/22 0994          Range of Motion Comprehensive    General Range of Motion bilateral lower extremity ROM WFL  -CS     Comment, General Range of Motion L LE limited secondary to post-op; R LE WFL  -CS     Row Name 12/19/22 0958          Strength Comprehensive (MMT)    General Manual Muscle Testing (MMT) Assessment other (see comments)  -CS     Comment, General Manual Muscle Testing (MMT) Assessment L LE limited secondary to post-op; R LE WFL  -CS     Row Name 12/19/22 0955          Balance    Balance Assessment sitting static balance;sitting dynamic balance;standing static balance  -CS     Static Sitting Balance contact guard  -CS     Dynamic Sitting Balance contact guard  -CS     Position, Sitting Balance sitting edge of bed  -CS     Static Standing Balance maximum assist;2-person assist;verbal cues;non-verbal cues (demo/gesture)  -CS     Position/Device Used, Standing Balance supported;walker, front-wheeled  -CS           User Key  (r) = Recorded By, (t) = Taken By, (c) = Cosigned By    Initials Name Provider Type    Ju Brush PT Physical Therapist               Goals/Plan     Row Name 12/19/22 1004          Bed Mobility Goal 1 (PT)    Activity/Assistive Device (Bed Mobility Goal 1, PT) sit to supine;supine to sit  -CS     [place] Level/Cues Needed (Bed Mobility Goal 1, PT) moderate assist (50-74% patient effort)  -CS     Time Frame (Bed Mobility Goal 1, PT) 1 week  -     Row Name 12/19/22 1004          Transfer Goal 1 (PT)    Activity/Assistive Device (Transfer Goal 1, PT) sit-to-stand/stand-to-sit;bed-to-chair/chair-to-bed  -CS     [place] Level/Cues  Needed (Transfer Goal 1, PT) moderate assist (50-74% patient effort);maximum assist (25-49% patient effort)  -CS     Time Frame (Transfer Goal 1, PT) 1 week  -CS     Row Name 12/19/22 1004          Gait Training Goal 1 (PT)    Activity/Assistive Device (Gait Training Goal 1, PT) gait (walking locomotion);assistive device use;decrease fall risk;diminish gait deviation;maintain weight-bearing status  -CS     [place] Level (Gait Training Goal 1, PT) moderate assist (50-74% patient effort)  -CS     Distance (Gait Training Goal 1, PT) 5-10'  -CS     Time Frame (Gait Training Goal 1, PT) 1 week  -CS           User Key  (r) = Recorded By, (t) = Taken By, (c) = Cosigned By    Initials Name Provider Type    CS Ju Albarado, PT Physical Therapist               Clinical Impression     Row Name 12/19/22 0968          Pain    Pretreatment Pain Rating 4/10  -CS     Posttreatment Pain Rating 4/10  -CS     Pain Location - Side/Orientation Left  -CS     Pain Location incisional  -CS     Pain Location - hip  -CS     Pain Intervention(s) Rest;Repositioned  -CS     Row Name 12/19/22 3906          Plan of Care Review    Plan of Care Reviewed With patient  -CS     Outcome Evaluation Pt is a 83 y/o F [facility] now POD 1 s/p cephalomedullary fixation of left intertrochanteric femur fracture. Pt is 50% WB on L LE. Pt has a med hx of severe dementia, DM, HTN, hypothyroidism, and severe osteoarthritis. Pt received in bed upon arrival and agreeable to PT/OT eval. Pt AxOx1 and presents with short-term memory loss. PLOF limited this visit secondary to cognitive status but was residing at nursing home and used a RW for mobility. Pt presents to PT with post-op pain and weakness, decreased endurance, and impaired functional mobility. Pt required max A x 2 for bed mobility tasks. Pt attempted to stand c RW requiring max A x 2 but unable to maintain WB'ing precautions regardless of verbal and tactile cues for body  mehcanics and restrictions. Bed to chair transfer deferred secondary to poor standing tolerance and weakness. Pt retuned to supine with all needs in reach. PT recommends SNF at D/C to address stated deficits.  -CS     Row Name 12/19/22 0958          Therapy Assessment/Plan (PT)    Rehab Potential (PT) fair, will monitor progress closely  -CS     Criteria for Skilled Interventions Met (PT) yes;meets criteria  -CS     Therapy Frequency (PT) 5 times/wk  -CS     Row Name 12/19/22 0958          Positioning and Restraints    Pre-Treatment Position in bed  -CS     Post Treatment Position bed  -CS     In Bed notified nsg;supine;call light within reach;encouraged to call for assist;exit alarm on;LLE elevated  -CS           User Key  (r) = Recorded By, (t) = Taken By, (c) = Cosigned By    Initials Name Provider Type    Ju Brush, PT Physical Therapist               Outcome Measures     Row Name 12/19/22 1004          How much help from another person do you currently need...    Turning from your back to your side while in flat bed without using bedrails? 2  -CS     Moving from lying on back to sitting on the side of a flat bed without bedrails? 2  -CS     Moving to and from a bed to a chair (including a wheelchair)? 1  -CS     Standing up from a chair using your arms (e.g., wheelchair, bedside chair)? 2  -CS     Climbing 3-5 steps with a railing? 1  -CS     To walk in hospital room? 1  -CS     AM-PAC 6 Clicks Score (PT) 9  -CS     Highest level of mobility 3 --> Sat at edge of bed  -CS     Row Name 12/19/22 1004          Functional Assessment    Outcome Measure Options AM-PAC 6 Clicks Basic Mobility (PT)  -CS           User Key  (r) = Recorded By, (t) = Taken By, (c) = Cosigned By    Initials Name Provider Type    Ju Brush PT Physical Therapist                             Physical Therapy Education     Title: PT OT SLP Therapies (In Progress)     Topic: Physical Therapy (In Progress)     Point: Mobility  training (In Progress)     Learning Progress Summary           Patient Acceptance, E,TB, NR by  at 12/19/2022 1004                   Point: Home exercise program (Not Started)     Learner Progress:  Not documented in this visit.          Point: Body mechanics (In Progress)     Learning Progress Summary           Patient Acceptance, E,TB, NR by CS at 12/19/2022 1004                   Point: Precautions (In Progress)     Learning Progress Summary           Patient Acceptance, E,TB, NR by CS at 12/19/2022 1004                               User Key     Initials Effective Dates Name Provider Type Discipline     09/22/22 -  Ju Albarado, PT Physical Therapist PT              PT Recommendation and Plan     Plan of Care Reviewed With: patient  Outcome Evaluation: Pt is a 83 y/o F admitted to [facility] after a fall now POD 1 s/p cephalomedullary fixation of left intertrochanteric femur fracture. Pt is 50% WB on L LE. Pt has a med hx of severe dementia, DM, HTN, hypothyroidism, and severe osteoarthritis. Pt received in bed upon arrival and agreeable to PT/OT eval. Pt AxOx1 and presents with short-term memory loss. PLOF limited this visit secondary to cognitive status but was residing at nursing home and used a RW for mobility. Pt presents to PT with post-op pain and weakness, decreased endurance, and impaired functional mobility. Pt required max A x 2 for bed mobility tasks. Pt attempted to stand c RW requiring max A x 2 but unable to maintain WB'ing precautions regardless of verbal and tactile cues for body mehcanics and restrictions. Bed to chair transfer deferred secondary to poor standing tolerance and weakness. Pt retuned to supine with all needs in reach. PT recommends SNF at D/C to address stated deficits.     Time Calculation:    PT Charges     Row Name 12/19/22 1005             Time Calculation    Start Time 0827  -      Stop Time 0846  -      Time Calculation (min) 19 min  -CS      PT Received On 12/19/22   -CS      PT - Next Appointment 12/20/22  -CS      PT Goal Re-Cert Due Date 12/26/22  -CS         Time Calculation- PT    Total Timed Code Minutes- PT 18 minute(s)  -CS         Timed Charges    09753 - PT Therapeutic Activity Minutes 18  -CS         Total Minutes    Timed Charges Total Minutes 18  -CS       Total Minutes 18  -CS            User Key  (r) = Recorded By, (t) = Taken By, (c) = Cosigned By    Initials Name Provider Type    CS Ju Albarado, PT Physical Therapist              Therapy Charges for Today     Code Description Service Date Service Provider Modifiers Qty    92738549917 HC PT THERAPEUTIC ACT EA 15 MIN 12/19/2022 Ju Albarado, PT GP 1    31795849500 HC PT EVAL MOD COMPLEXITY 3 12/19/2022 Ju Albarado, PT GP 1          PT G-Codes  Outcome Measure Options: AM-PAC 6 Clicks Basic Mobility (PT)  AM-PAC 6 Clicks Score (PT): 9  PT Discharge Summary  Anticipated Discharge Disposition (PT): skilled nursing facility    Ju Albarado PT  12/19/2022

## 2022-12-19 NOTE — DISCHARGE PLACEMENT REQUEST
Velma Carrasco (82 y.o. Female)     Date of Birth   1940    Social Security Number       Address   [place] [id number] [place] [address]    Home Phone   [phone]    MRN   [id number]       Latter-day   Unitarian Universalist    Marital Status                               Admission Date   12/17/22    Admission Type   Emergency    Admitting Provider   Rafael Acharya MD    Attending Provider   Rafael Acharya MD    Department, Room/Bed   [address], P782/1       Discharge Date       Discharge Disposition       Discharge Destination                               Attending Provider: Rafael Acharya MD    Allergies: No Known Allergies    Isolation: None   Infection: None   Code Status: No CPR    Ht: 165.1 cm (65\")   Wt: 89.1 kg (196 lb 6.9 oz)    Admission Cmt: None   Principal Problem: Closed left hip fracture (HCC) [S72.002A]                 Active Insurance as of 12/17/2022     Primary Coverage     Payor Plan Insurance Group Employer/Plan Group    MEDICARE MEDICARE A & B      Payor Plan Address Payor Plan Phone Number Payor Plan Fax Number Effective Dates    [address] [phone]  11/1/2003 - None Entered    [facility] [id number]       Subscriber Name Subscriber Birth Date Member ID       VELMA CARRASCO 1940 [id number]           Secondary Coverage     Payor Plan Insurance Group Employer/Plan Group    [employer]      Payor Plan Address Payor Plan Phone Number Payor Plan Fax Number Effective Dates    [address] [employer] [address] [phone]  1/1/2010 - None Entered    [facility] [address]       Subscriber Name Subscriber Birth Date Member ID       VELMA CARRASCO 1940 [id number]                 Emergency Contacts      (Rel.) Home Phone Work Phone Mobile Phone    DEVAN CARRASCO (Son) -- -- [phone]

## 2022-12-19 NOTE — PLAN OF CARE
Goal Outcome Evaluation:  Plan of Care Reviewed With: patient        Progress: no change  Outcome Evaluation: Pt is a 83 y/o F admitted to  [facility] after a fall now POD 1 s/p cephalomedullary fixation of left intertrochanteric femur fracture. Pt is 50% WB on L LE. Hx of severe dementia, severe osteoarthritis. Pt seen for OT eval this date, cotx with PT to maximize therapeutic benefit. Pt reports mod (I) at baseline, however A&Ox1 this date with short term memory loss, PLOF limited due to cognition, uses RWx for mobility and from NH facility. Today, pt presents with impaired ADLs, act tolerance, increased pain, impaired UE strength, impaired safety/cog, poor balance and impaired functional transfers. Pt required max A x2 for bed mobility, heavy cues, max A for LBD, max A x2 for STS, unable to complete full stand and not safe to transfer to chair. Pt not able to maintain precautions throughout despite cueing. Pt will continue to benefit from skilled OT to address noted functional deficits and progress toward improved (I) with ADLs and functional transfers . Recommending SNF at d/c.

## 2022-12-19 NOTE — THERAPY EVALUATION
Patient Name: Velma Bangura  : 1940    MRN: [id number]                              Today's Date: 2022       Admit Date: 2022    Visit Dx:     ICD-10-CM ICD-9-CM   1. Closed fracture of left hip, initial encounter ([facility])  S72.002A 820.8   2. Contusion of left knee, initial encounter  S80.02XA 924.11     Patient Active Problem List   Diagnosis   • Closed left hip fracture (HCC)     Past Medical History:   Diagnosis Date   • Arthritis    • Dementia (HCC)    • Depression    • Diabetes mellitus (HCC)    • Disease of thyroid gland    • Hypotension    • PONV (postoperative nausea and vomiting)      Past Surgical History:   Procedure Laterality Date   • BREAST LUMPECTOMY Left    • HIP INTERTROCHANTERIC NAILING Left 2022    Procedure: HIP INTERTROCHANTERIC NAILING;  Surgeon: Williams Church MD;  Location: [facility];  Service: Orthopedics;  Laterality: Left;   • KNEE ARTHROPLASTY Left       General Information     Row Name 22 1317          OT Time and Intention    Document Type evaluation  -     Mode of Treatment co-treatment;occupational therapy;physical therapy  -     Row Name 22 1317          General Information    Patient Profile Reviewed yes  -MW     Prior Level of Function independent:;ADL's  pt reports she was mod (I) with ADLs, unsure of accuracy of info provided due to cog deficits and short term memory deficits noted  -MW     Barriers to Rehab medically complex;previous functional deficit;cognitive status  -     Row Name 22 131          Living Environment    People in Home facility resident  -     Row Name 22 1317          Home Main Entrance    Number of Stairs, Main Entrance none  -     Row Name 22 131          Cognition    Orientation Status (Cognition) oriented to;person;verbal cues/prompts needed for orientation  -     Row Name 22          Safety Issues, Functional Mobility    Safety Issues Affecting Function (Mobility)  ability to follow commands;insight into deficits/self-awareness;positioning of assistive device;safety precaution awareness;problem-solving;safety precautions follow-through/compliance;sequencing abilities  -     Impairments Affecting Function (Mobility) balance;cognition;endurance/activity tolerance;pain;range of motion (ROM);strength  -     Cognitive Impairments, Mobility Safety/Performance attention;safety precaution awareness;safety precaution follow-through;sequencing abilities;insight into deficits/self-awareness;judgment;problem-solving/reasoning  -           User Key  (r) = Recorded By, (t) = Taken By, (c) = Cosigned By    Initials Name Provider Type     Ashli Gonzalez OT Occupational Therapist                 Mobility/ADL's     Row Name 12/19/22 1318          Bed Mobility    Bed Mobility supine-sit;sit-supine  -     Supine-Sit [place] (Bed Mobility) maximum assist (25% patient effort);2 person assist;verbal cues;nonverbal cues (demo/gesture)  -     Sit-Supine [place] (Bed Mobility) maximum assist (25% patient effort);2 person assist;verbal cues;nonverbal cues (demo/gesture)  -     Assistive Device (Bed Mobility) draw sheet;head of bed elevated  -     Comment, (Bed Mobility) max cues for all sequencing, increased time to complete bed mob, no active initiation of LLE and required full assist, resistive at times due to pain  -[facility] Name 12/19/22 1318          Transfers    Transfers sit-stand transfer  -     Comment, (Transfers) deferred up to chair this date due to poor safety, poor standing tolerance and not able to adhere to precautions  -     Row Name 12/19/22 1318          Bed-Chair Transfer    Bed-Chair [place] (Transfers) unable to assess  -     Row Name 12/19/22 1318          Sit-Stand Transfer    Sit-Stand [place] (Transfers) maximum assist (25% patient effort);2 person assist;verbal cues;nonverbal cues (demo/gesture)  -     Assistive Device  (Sit-Stand Transfers) walker, front-wheeled  -     Comment, (Sit-Stand Transfer) unable to maintain precautions, poor transition to Rwx despite heavy cueing  -[facility] Name 12/19/22 1318          Activities of Daily Living    BADL Assessment/Intervention lower body dressing;toileting;feeding  -MW     Row Name 12/19/22 1318          Mobility    Extremity Weight-bearing Status left lower extremity  -     Left Lower Extremity (Weight-bearing Status) partial weight-bearing (PWB)  -MW     Row Name 12/19/22 1318          Lower Body Dressing Assessment/Training    [place] Level (Lower Body Dressing) socks;dependent (less than 25% patient effort)  -     Position (Lower Body Dressing) supine  -MW     Row Name 12/19/22 1318          Toileting Assessment/Training    Comment, (Toileting) purewick and brief donned, anticipate total A at bed level, not safe to transfer onto BSC  -[facility] 12/19/22 1318          Self-Feeding Assessment/Training    [place] Level (Feeding) finger foods  -     Comment, (Feeding) hand to mouth once tray s/up and sausage cut up  -           User Key  (r) = Recorded By, (t) = Taken By, (c) = Cosigned By    Initials Name Provider Type    MW Ashli Gonzalez OT Occupational Therapist               Obj/Interventions     [facility] Name 12/19/22 1321          Sensory Assessment (Somatosensory)    Sensory Assessment (Somatosensory) unable/difficult to assess  -[facility] 12/19/22 1321          Vision Assessment/Intervention    Visual Impairment/Limitations unable/difficult to assess  -[facility] 12/19/22 1321          Range of Motion Comprehensive    General Range of Motion bilateral upper extremity ROM WNL  -MW     Row Name 12/19/22 1321          Strength Comprehensive (MMT)    General Manual Muscle Testing (MMT) Assessment upper extremity strength deficits identified  -     Comment, General Manual Muscle Testing (MMT) Assessment generalized post op weakness, BUE grossly  3+/5  -MW     Row Name 12/19/22 1321          Motor Skills    Motor Skills functional endurance  -     Functional Endurance poor, quick to fatigue  -     Row Name 12/19/22 1321          Balance    Balance Assessment sitting static balance;sitting dynamic balance;sit to stand dynamic balance;standing static balance  -MW     Static Sitting Balance contact guard  -MW     Dynamic Sitting Balance contact guard  -MW     Position, Sitting Balance unsupported;sitting edge of bed  -     Sit to Stand Dynamic Balance maximum assist;2-person assist;verbal cues;non-verbal cues (demo/gesture)  -MW     Static Standing Balance maximum assist;2-person assist;verbal cues;non-verbal cues (demo/gesture)  -MW     Position/Device Used, Standing Balance supported;walker, front-wheeled  -MW     Comment, Balance unable to come to full stand, bed height elevated  -           User Key  (r) = Recorded By, (t) = Taken By, (c) = Cosigned By    Initials Name Provider Type     Ashli Gonzalez, NIKKI Occupational Therapist               Goals/Plan     Row Name 12/19/22 1326          Transfer Goal 1 (OT)    Activity/Assistive Device (Transfer Goal 1, OT) sit-to-stand/stand-to-sit;bed-to-chair/chair-to-bed;toilet  -     [place] Level/Cues Needed (Transfer Goal 1, OT) moderate assist (50-74% patient effort)  -MW     Time Frame (Transfer Goal 1, OT) short term goal (STG);2 weeks  -MW     Progress/Outcome (Transfer Goal 1, OT) goal ongoing  -     Row Name 12/19/22 1326          Dressing Goal 1 (OT)    Activity/Device (Dressing Goal 1, OT) lower body dressing  -     [place]/Cues Needed (Dressing Goal 1, OT) moderate assist (50-74% patient effort)  -     Time Frame (Dressing Goal 1, OT) short term goal (STG);2 weeks  -     Progress/Outcome (Dressing Goal 1, OT) goal ongoing  -     Row Name 12/19/22 1326          Toileting Goal 1 (OT)    Activity/Device (Toileting Goal 1, OT) toileting skills, all  -MW     [place]  Level/Cues Needed (Toileting Goal 1, OT) moderate assist (50-74% patient effort)  -MW     Time Frame (Toileting Goal 1, OT) short term goal (STG);2 weeks  -MW     Progress/Outcome (Toileting Goal 1, OT) goal ongoing  -MW     Row Name 12/19/22 1326          Grooming Goal 1 (OT)    Activity/Device (Grooming Goal 1, OT) grooming skills, all  -MW     [place] (Grooming Goal 1, OT) minimum assist (75% or more patient effort)  -MW     Time Frame (Grooming Goal 1, OT) short term goal (STG);2 weeks  -MW     Progress/Outcome (Grooming Goal 1, OT) goal ongoing  -MW     Row Name 12/19/22 1326          Therapy Assessment/Plan (OT)    Planned Therapy Interventions (OT) activity tolerance training;functional balance retraining;BADL retraining;neuromuscular control/coordination retraining;occupation/activity based interventions;ROM/therapeutic exercise;strengthening exercise;transfer/mobility retraining;patient/caregiver education/training  -MW           User Key  (r) = Recorded By, (t) = Taken By, (c) = Cosigned By    Initials Name Provider Type    Ashli Mustafa, OT Occupational Therapist               Clinical Impression     Row Name 12/19/22 1321          Pain Assessment    Pretreatment Pain Rating 4/10  -MW     Posttreatment Pain Rating 4/10  -MW     Row Name 12/19/22 1321          Plan of Care Review    Plan of Care Reviewed With patient  -MW     Progress no change  -MW     Outcome Evaluation Pt is a 83 y/o F admitted to [facility] after a fall now POD 1 s/p cephalomedullary fixation of left intertrochanteric femur fracture. Pt is 50% WB on L LE. Hx of severe dementia, severe osteoarthritis. Pt seen for OT eval this date, cotx with PT to maximize therapeutic benefit. Pt reports mod (I) at baseline, however A&Ox1 this date with short term memory loss, PLOF limited due to cognition, uses RWx for mobility and from NH facility. Today, pt presents with impaired ADLs, act tolerance, increased pain, impaired UE strength,  impaired safety/cog, poor balance and impaired functional transfers. Pt required max A x2 for bed mobility, heavy cues, max A for LBD, max A x2 for STS, unable to complete full stand and not safe to transfer to chair. Pt not able to maintain precautions throughout despite cueing. Pt will continue to benefit from skilled OT to address noted functional deficits and progress toward improved (I) with ADLs and functional transfers . Recommending SNF at d/c.  -     Row Name 12/19/22 1321          Therapy Assessment/Plan (OT)    Rehab Potential (OT) good, to achieve stated therapy goals  -     Criteria for Skilled Therapeutic Interventions Met (OT) yes;skilled treatment is necessary;meets criteria  -     Therapy Frequency (OT) 5 times/wk  -     Row Name 12/19/22 1321          Therapy Plan Review/Discharge Plan (OT)    Anticipated Discharge Disposition (OT) skilled nursing facility  -     Row Name 12/19/22 1321          Vital Signs    O2 Delivery Pre Treatment room air  -MW     Pre Patient Position Supine  -MW     Intra Patient Position Standing  -MW     Post Patient Position Supine  -     Row Name 12/19/22 1321          Positioning and Restraints    Pre-Treatment Position in bed  -MW     Post Treatment Position bed  -MW     In Bed notified nsg;fowlers;call light within reach;encouraged to call for assist;exit alarm on;side rails up x3;SCD pump applied;legs elevated  -MW           User Key  (r) = Recorded By, (t) = Taken By, (c) = Cosigned By    Initials Name Provider Type    Ashli Mustafa, OT Occupational Therapist               Outcome Measures     Row Name 12/19/22 1329          How much help from another is currently needed...    Putting on and taking off regular lower body clothing? 1  -MW     Bathing (including washing, rinsing, and drying) 1  -MW     Toileting (which includes using toilet bed pan or urinal) 1  -MW     Putting on and taking off regular upper body clothing 2  -MW     Taking care of  personal grooming (such as brushing teeth) 2  -MW     Eating meals 3  -MW     AM-PAC 6 Clicks Score (OT) 10  -MW     Row Name 12/19/22 1004          How much help from another person do you currently need...    Turning from your back to your side while in flat bed without using bedrails? 2  -CS     Moving from lying on back to sitting on the side of a flat bed without bedrails? 2  -CS     Moving to and from a bed to a chair (including a wheelchair)? 1  -CS     Standing up from a chair using your arms (e.g., wheelchair, bedside chair)? 2  -CS     Climbing 3-5 steps with a railing? 1  -CS     To walk in hospital room? 1  -CS     AM-PAC 6 Clicks Score (PT) 9  -CS     Highest level of mobility 3 --> Sat at edge of bed  -CS     Row Name 12/19/22 1326          Modified [place] Scale    Modified [place] Scale 4 - Moderately severe disability.  Unable to walk without assistance, and unable to attend to own bodily needs without assistance.  -     Row Name 12/19/22 1326 12/19/22 1004       Functional Assessment    Outcome Measure Options AM-PAC 6 Clicks Daily Activity (OT);Modified [place]  -MW AM-PAC 6 Clicks Basic Mobility (PT)  -CS          User Key  (r) = Recorded By, (t) = Taken By, (c) = Cosigned By    Initials Name Provider Type    Ashli Mustafa OT Occupational Therapist    Ju Brush PT Physical Therapist                Occupational Therapy Education     Title: PT OT SLP Therapies (In Progress)     Topic: Occupational Therapy (In Progress)     Point: ADL training (In Progress)     Description:   Instruct learner(s) on proper safety adaptation and remediation techniques during self care or transfers.   Instruct in proper use of assistive devices.              Learning Progress Summary           Patient Acceptance, E, NR by EDILBERTO at 12/19/2022 1327    Comment: role of OT, hip precautions, xfer tech, d/c rec                   Point: Home exercise program (In Progress)     Description:   Instruct learner(s)  on appropriate technique for monitoring, assisting and/or progressing therapeutic exercises/activities.              Learning Progress Summary           Patient Acceptance, E, NR by  at 12/19/2022 1327    Comment: role of OT, hip precautions, xfer tech, d/c rec                   Point: Precautions (In Progress)     Description:   Instruct learner(s) on prescribed precautions during self-care and functional transfers.              Learning Progress Summary           Patient Acceptance, E, NR by  at 12/19/2022 1327    Comment: role of OT, hip precautions, xfer tech, d/c rec                   Point: Body mechanics (In Progress)     Description:   Instruct learner(s) on proper positioning and spine alignment during self-care, functional mobility activities and/or exercises.              Learning Progress Summary           Patient Acceptance, E, NR by  at 12/19/2022 1327    Comment: role of OT, hip precautions, xfer tech, d/c rec                               User Key     Initials Effective Dates Name Provider Type Discipline     08/20/21 -  Ashli Gonzalez OT Occupational Therapist OT              OT Recommendation and Plan  Planned Therapy Interventions (OT): activity tolerance training, functional balance retraining, BADL retraining, neuromuscular control/coordination retraining, occupation/activity based interventions, ROM/therapeutic exercise, strengthening exercise, transfer/mobility retraining, patient/caregiver education/training  Therapy Frequency (OT): 5 times/wk  Plan of Care Review  Plan of Care Reviewed With: patient  Progress: no change  Outcome Evaluation: Pt is a 81 y/o F admitted to [facility] after a fall now POD 1 s/p cephalomedullary fixation of left intertrochanteric femur fracture. Pt is 50% WB on L LE. Hx of severe dementia, severe osteoarthritis. Pt seen for OT eval this date, cotx with PT to maximize therapeutic benefit. Pt reports mod (I) at baseline, however A&Ox1 this date with short  term memory loss, PLOF limited due to cognition, uses RWx for mobility and from NH facility. Today, pt presents with impaired ADLs, act tolerance, increased pain, impaired UE strength, impaired safety/cog, poor balance and impaired functional transfers. Pt required max A x2 for bed mobility, heavy cues, max A for LBD, max A x2 for STS, unable to complete full stand and not safe to transfer to chair. Pt not able to maintain precautions throughout despite cueing. Pt will continue to benefit from skilled OT to address noted functional deficits and progress toward improved (I) with ADLs and functional transfers . Recommending SNF at d/c.     Time Calculation:    Time Calculation- OT     Row Name 12/19/22 1327             Time Calculation- OT    OT Start Time 0826  -MW      OT Stop Time 0846  -MW      OT Time Calculation (min) 20 min  -MW      Total Timed Code Minutes- OT 14 minute(s)  -MW      OT Received On 12/19/22  -MW      OT - Next Appointment 12/20/22  -MW      OT Goal Re-Cert Due Date 01/02/23  -MW         Timed Charges    51641 - OT Therapeutic Activity Minutes 14  -MW         Untimed Charges    OT Eval/Re-eval Minutes 6  -MW         Total Minutes    Timed Charges Total Minutes 14  -MW      Untimed Charges Total Minutes 6  -MW       Total Minutes 20  -MW            User Key  (r) = Recorded By, (t) = Taken By, (c) = Cosigned By    Initials Name Provider Type    Ashli Mustafa OT Occupational Therapist              Therapy Charges for Today     Code Description Service Date Service Provider Modifiers Qty    15045689455 HC OT THERAPEUTIC ACT EA 15 MIN 12/19/2022 Ashli Gonzalez OT GO 1    89499146883 HC OT EVAL MOD COMPLEXITY 2 12/19/2022 Ashli Gonzalez OT GO 1               Ashli Gonzalez OT  12/19/2022

## 2022-12-20 VITALS
DIASTOLIC BLOOD PRESSURE: 77 MMHG | TEMPERATURE: 97.8 F | BODY MASS INDEX: 32.73 KG/M2 | WEIGHT: 196.43 LBS | OXYGEN SATURATION: 97 % | RESPIRATION RATE: 16 BRPM | HEART RATE: 97 BPM | HEIGHT: 65 IN | SYSTOLIC BLOOD PRESSURE: 136 MMHG

## 2022-12-20 LAB
ANION GAP SERPL CALCULATED.3IONS-SCNC: 8.5 MMOL/L (ref 5–15)
BASOPHILS # BLD AUTO: 0.06 10*3/MM3 (ref 0–0.2)
BASOPHILS NFR BLD AUTO: 0.6 % (ref 0–1.5)
BUN SERPL-MCNC: 20 MG/DL (ref 8–23)
BUN/CREAT SERPL: 28.2 (ref 7–25)
CALCIUM SPEC-SCNC: 8.6 MG/DL (ref 8.6–10.5)
CHLORIDE SERPL-SCNC: 97 MMOL/L (ref 98–107)
CO2 SERPL-SCNC: 28.5 MMOL/L (ref 22–29)
CREAT SERPL-MCNC: 0.71 MG/DL (ref 0.57–1)
DEPRECATED RDW RBC AUTO: 39.3 FL (ref 37–54)
EGFRCR SERPLBLD CKD-EPI 2021: 84 ML/MIN/1.73
EOSINOPHIL # BLD AUTO: 0.24 10*3/MM3 (ref 0–0.4)
EOSINOPHIL NFR BLD AUTO: 2.4 % (ref 0.3–6.2)
ERYTHROCYTE [DISTWIDTH] IN BLOOD BY AUTOMATED COUNT: 11.6 % (ref 12.3–15.4)
GLUCOSE BLDC GLUCOMTR-MCNC: 179 MG/DL (ref 70–130)
GLUCOSE BLDC GLUCOMTR-MCNC: 182 MG/DL (ref 70–130)
GLUCOSE SERPL-MCNC: 200 MG/DL (ref 65–99)
HCT VFR BLD AUTO: 36.1 % (ref 34–46.6)
HGB BLD-MCNC: 12.1 G/DL (ref 12–15.9)
IMM GRANULOCYTES # BLD AUTO: 0.11 10*3/MM3 (ref 0–0.05)
IMM GRANULOCYTES NFR BLD AUTO: 1.1 % (ref 0–0.5)
LYMPHOCYTES # BLD AUTO: 2.34 10*3/MM3 (ref 0.7–3.1)
LYMPHOCYTES NFR BLD AUTO: 23 % (ref 19.6–45.3)
MCH RBC QN AUTO: 30.8 PG (ref 26.6–33)
MCHC RBC AUTO-ENTMCNC: 33.5 G/DL (ref 31.5–35.7)
MCV RBC AUTO: 91.9 FL (ref 79–97)
MONOCYTES # BLD AUTO: 1.23 10*3/MM3 (ref 0.1–0.9)
MONOCYTES NFR BLD AUTO: 12.1 % (ref 5–12)
NEUTROPHILS NFR BLD AUTO: 6.2 10*3/MM3 (ref 1.7–7)
NEUTROPHILS NFR BLD AUTO: 60.8 % (ref 42.7–76)
NRBC BLD AUTO-RTO: 0 /100 WBC (ref 0–0.2)
PLATELET # BLD AUTO: 238 10*3/MM3 (ref 140–450)
PMV BLD AUTO: 10.3 FL (ref 6–12)
POTASSIUM SERPL-SCNC: 4.3 MMOL/L (ref 3.5–5.2)
RBC # BLD AUTO: 3.93 10*6/MM3 (ref 3.77–5.28)
SODIUM SERPL-SCNC: 134 MMOL/L (ref 136–145)
WBC NRBC COR # BLD: 10.18 10*3/MM3 (ref 3.4–10.8)

## 2022-12-20 PROCEDURE — 85025 COMPLETE CBC W/AUTO DIFF WBC: CPT | Performed by: HOSPITALIST

## 2022-12-20 PROCEDURE — 63710000001 INSULIN LISPRO (HUMAN) PER 5 UNITS: Performed by: HOSPITALIST

## 2022-12-20 PROCEDURE — 82962 GLUCOSE BLOOD TEST: CPT

## 2022-12-20 PROCEDURE — 80048 BASIC METABOLIC PNL TOTAL CA: CPT | Performed by: HOSPITALIST

## 2022-12-20 RX ORDER — INSULIN LISPRO 100 [IU]/ML
7 INJECTION, SOLUTION INTRAVENOUS; SUBCUTANEOUS
Qty: 100 ML | Refills: 0 | Status: SHIPPED | OUTPATIENT
Start: 2022-12-20 | End: 2023-01-19

## 2022-12-20 RX ORDER — ASPIRIN 81 MG/1
81 TABLET ORAL DAILY
Start: 2022-12-20

## 2022-12-20 RX ORDER — BISACODYL 10 MG
10 SUPPOSITORY, RECTAL RECTAL ONCE
Status: COMPLETED | OUTPATIENT
Start: 2022-12-20 | End: 2022-12-20

## 2022-12-20 RX ORDER — PANTOPRAZOLE SODIUM 40 MG/1
40 TABLET, DELAYED RELEASE ORAL
Qty: 30 TABLET | Refills: 0 | Status: SHIPPED | OUTPATIENT
Start: 2022-12-21 | End: 2023-01-20

## 2022-12-20 RX ORDER — HYDROCODONE BITARTRATE AND ACETAMINOPHEN 5; 325 MG/1; MG/1
1 TABLET ORAL EVERY 6 HOURS PRN
Qty: 10 TABLET | Refills: 0 | Status: SHIPPED | OUTPATIENT
Start: 2022-12-20 | End: 2022-12-23

## 2022-12-20 RX ORDER — INSULIN LISPRO 100 [IU]/ML
7 INJECTION, SOLUTION INTRAVENOUS; SUBCUTANEOUS
Status: DISCONTINUED | OUTPATIENT
Start: 2022-12-20 | End: 2022-12-20 | Stop reason: HOSPADM

## 2022-12-20 RX ADMIN — BISACODYL 10 MG: 10 SUPPOSITORY RECTAL at 13:00

## 2022-12-20 RX ADMIN — INSULIN LISPRO 5 UNITS: 100 INJECTION, SOLUTION INTRAVENOUS; SUBCUTANEOUS at 08:22

## 2022-12-20 RX ADMIN — INSULIN LISPRO 2 UNITS: 100 INJECTION, SOLUTION INTRAVENOUS; SUBCUTANEOUS at 08:22

## 2022-12-20 RX ADMIN — POLYETHYLENE GLYCOL 3350 17 G: 17 POWDER, FOR SOLUTION ORAL at 08:22

## 2022-12-20 RX ADMIN — HYDROCODONE BITARTRATE AND ACETAMINOPHEN 1 TABLET: 5; 325 TABLET ORAL at 13:06

## 2022-12-20 RX ADMIN — LEVOTHYROXINE SODIUM 50 MCG: 0.05 TABLET ORAL at 08:22

## 2022-12-20 RX ADMIN — Medication 250 MG: at 08:22

## 2022-12-20 RX ADMIN — INSULIN LISPRO 2 UNITS: 100 INJECTION, SOLUTION INTRAVENOUS; SUBCUTANEOUS at 12:05

## 2022-12-20 RX ADMIN — HYDROCODONE BITARTRATE AND ACETAMINOPHEN 1 TABLET: 5; 325 TABLET ORAL at 04:17

## 2022-12-20 RX ADMIN — HYDROCODONE BITARTRATE AND ACETAMINOPHEN 1 TABLET: 5; 325 TABLET ORAL at 08:22

## 2022-12-20 RX ADMIN — DOCUSATE SODIUM 100 MG: 100 CAPSULE, LIQUID FILLED ORAL at 13:00

## 2022-12-20 RX ADMIN — INSULIN LISPRO 7 UNITS: 100 INJECTION, SOLUTION INTRAVENOUS; SUBCUTANEOUS at 12:05

## 2022-12-20 RX ADMIN — ASPIRIN 81 MG: 81 TABLET, COATED ORAL at 08:22

## 2022-12-20 NOTE — CASE MANAGEMENT/SOCIAL WORK
Continued Stay Note  [facility]     Patient Name: Velma Bangura  MRN: [id number]  Today's Date: 12/20/2022    Admit Date: 12/17/2022    Plan: [facility] via EMS at 3:30   Discharge Plan     Row Name 12/20/22 1117       Plan    Plan [facility] via EMS at 3:30    Patient/Family in Agreement with Plan yes    Plan Comments Spoke with pt's son to inform [facility] can accept. [facility] no beds at [facility]. Son is agreeable to [facility]. MD informed of bed availbility. Discussed transport with son, he doens't feel he can transport. EMS arranged for 3:30. Gave IMM via phone to son who asked that copy be left in room.               Discharge Codes    No documentation.               Expected Discharge Date and Time     Expected Discharge Date Expected Discharge Time    Dec 20, 2022             ROSE Costa

## 2022-12-20 NOTE — CASE MANAGEMENT/SOCIAL WORK
Physicians Statement of Medical Necessity for  Ambulance Transportation    GENERAL INFORMATION     Name: Velma Bangura  YOB: 1940  Medicare #: [id number]  Transport Date: 12/20/22 (Valid for round trips this date, or for scheduled repetitive trips for 60 days from the date signed below.)  Origin: [facility]  Destination: [facility]  Is the Patient's stay covered under Medicare Part A (PPS/DRG?)Yes  Closest appropriate facility? Yes  If this a hosp-hosp transfer? No  Is this a hospice patient? No    MEDICAL NECESSITY QUESTIONAIRE    Ambulance Transportation is medically necessary only if other means of transportation are contraindicated or would be potentially harmful to the patient.  To meet this requirement, the patient must be either \"bed confined\" or suffer from a condition such that transport by means other than an ambulance is contraindicated by the patient's condition.  The following questions must be answered by the healthcare professional signing below for this form to be valid:     1) Describe the MEDICAL CONDITION (physical and/or mental) of this patient AT THE TIME OF AMBULANCE TRANSPORT that requires the patient to be transported in an ambulance, and why transport by other means is contraindicated by the patient's condition:   Past Medical History:   Diagnosis Date   • Arthritis    • Dementia (HCC)    • Depression    • Diabetes mellitus (HCC)    • Disease of thyroid gland    • Hypotension    • PONV (postoperative nausea and vomiting)       Past Surgical History:   Procedure Laterality Date   • BREAST LUMPECTOMY Left    • HIP INTERTROCHANTERIC NAILING Left 12/18/2022    Procedure: HIP INTERTROCHANTERIC NAILING;  Surgeon: Williams Church MD;  Location: [facility];  Service: Orthopedics;  Laterality: Left;   • KNEE ARTHROPLASTY Left       2) Is this patient \"bed confined\" as defined below?Yes   To be \"bed confined\" the patient must satisfy all three of the following criteria:  (1) unable to  get up from bed without assistance; AND (2) unable to ambulate;  AND (3) unable to sit in a chair or wheelchair.  3) Can this patient safely be transported by car or wheelchair van (I.e., may safely sit during transport, without an attendant or monitoring?)No   4. In addition to completing questions 1-3 above, please check any of the following conditions that apply*:          *Note: supporting documentation for any boxes checked must be maintained in the patient's medical records Patient is confused and Unable to tolerate seated position for time needed to transport      SIGNATURE OF PHYSICIAN OR OTHER AUTHORIZED HEALTHCARE PROFESSIONAL    I certify that the above information is true and correct based on my evaluation of this patient, and represent that the patient requires transport by ambulance and that other forms of transport are contraindicated.  I understand that this information will be used by the Centers for Medicare and Medicaid Services (CMS) to support the determiniation of medical necessity for ambulance services, and I represent that I have personal knowledge of the patient's condition at the time of transport.    x   If this box is checked, I also certify that the patient is physically or mentally incapable of signing the ambulance service's claim form and that the institution with which I am affiliated has furnished care, services or assistance to the patient.  My signature below is made on behalf of the patient pursuant to 42 .36(b)(4). In accordance with 42 .37, the specific reason(s) that the patient is physically or mentally incapable of signing the claim for is as follows:     Signature of Physician or Healthcare Professional  Date/Time:        (For Scheduled repetitive transport, this form is not valid for transports performed more than 60 days after this date).                                                                                                                                             --------------------------------------------------------------------------------------------  Printed Name and Credentials of Physician or Authorized Healthcare Professional     *Form must be signed by patient's attending physician for scheduled, repetitive transports,.  For non-repetitive ambulance transports, if unable to obtain the signature of the attending physician, any of the following may sign (please select below):     Physician  Clinical Nurse Specialist  Registered Nurse     Physician Assistant  Discharge Planner  Licensed Practical Nurse     Nurse Practitioner

## 2022-12-20 NOTE — DISCHARGE SUMMARY
Discharge summary    Date of admission 12/17/2022  Date of discharge 12/20/2022    Final diagnosis  Left intertrochanteric femur fracture s/p cephalomedullary fixation  Severe dementia  Diabetes mellitus  Hypertension  Hypothyroidism  Osteoarthritis  Depression  Gastroesophageal reflux disease    Discharge medications    Current Facility-Administered Medications:   •  acetaminophen (TYLENOL) tablet 325 mg, 325 mg, Oral, Q4H PRN, Williams Church MD  •  aspirin EC tablet 81 mg, 81 mg, Oral, Q12H, Williams Church MD, 81 mg at 12/20/22 0822  •  atorvastatin (LIPITOR) tablet 10 mg, 10 mg, Oral, Nightly, Rafael Acharya MD, 10 mg at 12/19/22 2124  •  docusate sodium (COLACE) capsule 100 mg, 100 mg, Oral, BID PRN, Williams Church MD, 100 mg at 12/19/22 0918  •  HYDROcodone-acetaminophen (NORCO) 5-325 MG per tablet 1 tablet, 1 tablet, Oral, Q4H PRN, Rafael Acharya MD, 1 tablet at 12/20/22 0822  •  insulin glargine (LANTUS, SEMGLEE) injection 20 Units, 20 Units, Subcutaneous, Nightly, Rafael Acharya MD  •  insulin lispro (ADMELOG) injection 0-7 Units, 0-7 Units, Subcutaneous, 4x Daily With Meals & Nightly, Rafael Acharya MD, 2 Units at 12/20/22 0822  •  insulin lispro (ADMELOG) injection 7 Units, 7 Units, Subcutaneous, TID With Meals, Rafael Acharya MD  •  levothyroxine (SYNTHROID, LEVOTHROID) tablet 50 mcg, 50 mcg, Oral, Daily, Rafael Acharya MD, 50 mcg at 12/20/22 0822  •  memantine (NAMENDA) tablet 10 mg, 10 mg, Oral, Nightly, Rafael Acharya MD, 10 mg at 12/19/22 2124  •  [DISCONTINUED] ondansetron (ZOFRAN) tablet 4 mg, 4 mg, Oral, Q6H PRN **OR** ondansetron (ZOFRAN) injection 4 mg, 4 mg, Intravenous, Q6H PRN, Williams Church MD  •  pantoprazole (PROTONIX) EC tablet 40 mg, 40 mg, Oral, Q AM, Rafael Acharya MD  •  polyethylene glycol (MIRALAX) packet 17 g, 17 g, Oral, Daily, Williams Church MD, 17 g at 12/20/22 0822  •  saccharomyces boulardii (FLORASTOR) capsule 250 mg, 250 mg, Oral, Daily, Rafael Acharya MD, 250 mg at  12/20/22 0822  •  sertraline (ZOLOFT) tablet 25 mg, 25 mg, Oral, Nightly, Katia Acharya MD, 25 mg at 12/19/22 2124  •  [COMPLETED] Insert Peripheral IV, , , Once **AND** sodium chloride 0.9 % flush 10 mL, 10 mL, Intravenous, PRN, Williams Church MD  •  sodium chloride 0.9 % flush 10 mL, 10 mL, Intravenous, PRN, Williams Church MD  •  sodium chloride 0.9 % infusion 40 mL, 40 mL, Intravenous, PRN, Williams Church MD     Consults obtained  Orthopedic surgery    Procedures  Cephalomedullary fixation of left intertrochanteric femur fracture    Hospital course  82-year white female with history of multiple medical problem including severe dementia admitted to emergency room after the mechanical fall with left knee pain.  Patient work-up in ER revealed left hip fracture admit for management.  Patient admitted stabilized with pain medication bedrest supportive care and orthopedic surgery consult obtained.  Patient cephalomedullary fixation of left intertrochanteric femur fracture without any complication.  Postoperatively patient is doing well and working with PT OT and cleared for discharge to subacute rehab.  Patient remained DNR throughout hospital course.    Discharge diet regular    Activity per PT OT    Medication as above    Further care per accepting physician at subacute rehab and follow-up with orthopedic surgery per their instruction and take medication as directed.    KATIA ACHARYA MD

## 2022-12-20 NOTE — PLAN OF CARE
Goal Outcome Evaluation:  Plan of Care Reviewed With: patient      All goals met, pt safe for d/c  Progress: improving

## 2022-12-20 NOTE — PROGRESS NOTES
Orthopedic Progress Note      Patient: Velma Bangura    YOB: 1940    Medical Record Number: [id number]    Attending Physician: Rafael Acharya MD    Date of Admission: 12/17/2022  2:44 PM    Admitting Dx:  Closed left hip fracture (HCC) [S72.002A]  Closed fracture of left hip, initial encounter (HCC) [S72.002A]  Contusion of left knee, initial encounter [S80.02XA]    Status Post: Cephalomedullary fixation of left intertrochanteric femur fracture    Post Operative Day Number: 2    Current Problem List:   Patient Active Problem List   Diagnosis    Closed left hip fracture (HCC)         Past Medical History:   Diagnosis Date    Arthritis     Dementia (HCC)     Depression     Diabetes mellitus (HCC)     Disease of thyroid gland     Hypotension     PONV (postoperative nausea and vomiting)        Current Medications:  Scheduled Meds:aspirin, 81 mg, Oral, Q12H  atorvastatin, 10 mg, Oral, Nightly  insulin glargine, 15 Units, Subcutaneous, Nightly  insulin lispro, 0-7 Units, Subcutaneous, 4x Daily With Meals & Nightly  insulin lispro, 5 Units, Subcutaneous, TID With Meals  levothyroxine, 50 mcg, Oral, Daily  memantine, 10 mg, Oral, Nightly  pantoprazole, 40 mg, Oral, Q AM  polyethylene glycol, 17 g, Oral, Daily  saccharomyces boulardii, 250 mg, Oral, Daily  sertraline, 25 mg, Oral, Nightly      PRN Meds:.  acetaminophen    docusate sodium    HYDROcodone-acetaminophen    [DISCONTINUED] ondansetron **OR** ondansetron    [COMPLETED] Insert Peripheral IV **AND** sodium chloride    sodium chloride    sodium chloride    SUBJECTIVE: 82 y.o.  female oriented to name and place.    OBJECTIVE:   Vitals:    12/19/22 1759 12/19/22 2145 12/20/22 0240 12/20/22 0551   BP: 102/57 115/73 122/79 103/57   BP Location: Left arm Right arm Left arm Left arm   Patient Position: Lying Lying Lying Lying   Pulse: 89 82 88 81   Resp: 16 16 16 16   Temp: 99 °F (37.2 °C) 98.4 °F (36.9 °C) 98.7 °F (37.1 °C) 98.4 °F (36.9 °C)   TempSrc:  Oral Oral Oral Oral   SpO2: 92% 96% 95% 97%   Weight:       Height:         I/O last 3 completed shifts:  In: 850 [P.O.:850]  Out: 1850 [Urine:1850]    Diagnostic Tests:  Lab Results (last 72 hours)       Procedure Component Value Units Date/Time    Basic Metabolic Panel [[id number]]  (Abnormal) Collected: 12/20/22 0531    Specimen: Blood Updated: 12/20/22 0640     Glucose 200 mg/dL      BUN 20 mg/dL      Creatinine 0.71 mg/dL      Sodium 134 mmol/L      Potassium 4.3 mmol/L      Comment: Slight hemolysis detected by analyzer. Results may be affected.        Chloride 97 mmol/L      CO2 28.5 mmol/L      Calcium 8.6 mg/dL      BUN/Creatinine Ratio 28.2     Anion Gap 8.5 mmol/L      eGFR 84.0 mL/min/1.73      Comment: National Kidney Foundation and American Society of Nephrology (ASN) Task Force recommended calculation based on the Chronic Kidney Disease Epidemiology Collaboration (CKD-EPI) equation refit without adjustment for race.       Narrative:      GFR Normal >60  Chronic Kidney Disease <60  Kidney Failure <15    The GFR formula is only valid for adults with stable renal function between ages 18 and 70.    POC Glucose Once [[id number]]  (Abnormal) Collected: 12/20/22 0621    Specimen: Blood Updated: 12/20/22 0624     Glucose 179 mg/dL      Comment: Meter: [id number] : [id number] Shelbie DIAS       CBC & Differential [[id number]]  (Abnormal) Collected: 12/20/22 0531    Specimen: Blood Updated: 12/20/22 0616    Narrative:      The following orders were created for panel order CBC & Differential.  Procedure                               Abnormality         Status                     ---------                               -----------         ------                     CBC Auto Differential[[id number]]        Abnormal            Final result                 Please view results for these tests on the individual orders.    CBC Auto Differential [[id number]]  (Abnormal) Collected: 12/20/22 0531    Specimen: Blood  Updated: 12/20/22 0616     WBC 10.18 10*3/mm3      RBC 3.93 10*6/mm3      Hemoglobin 12.1 g/dL      Hematocrit 36.1 %      MCV 91.9 fL      MCH 30.8 pg      MCHC 33.5 g/dL      RDW 11.6 %      RDW-SD 39.3 fl      MPV 10.3 fL      Platelets 238 10*3/mm3      Neutrophil % 60.8 %      Lymphocyte % 23.0 %      Monocyte % 12.1 %      Eosinophil % 2.4 %      Basophil % 0.6 %      Immature Grans % 1.1 %      Neutrophils, Absolute 6.20 10*3/mm3      Lymphocytes, Absolute 2.34 10*3/mm3      Monocytes, Absolute 1.23 10*3/mm3      Eosinophils, Absolute 0.24 10*3/mm3      Basophils, Absolute 0.06 10*3/mm3      Immature Grans, Absolute 0.11 10*3/mm3      nRBC 0.0 /100 WBC     POC Glucose Once [[id number]]  (Abnormal) Collected: 12/19/22 2148    Specimen: Blood Updated: 12/19/22 2149     Glucose 205 mg/dL      Comment: Meter: [id number] : [id number] Shelbie DIAS       POC Glucose Once [[id number]]  (Abnormal) Collected: 12/19/22 1642    Specimen: Blood Updated: 12/19/22 1643     Glucose 175 mg/dL      Comment: Meter: [id number] : [id number] Linkagoal       POC Glucose Once [[id number]]  (Abnormal) Collected: 12/19/22 1039    Specimen: Blood Updated: 12/19/22 1040     Glucose 258 mg/dL      Comment: Meter: [id number] : [id number] Nelly Shantel PCA       Comprehensive Metabolic Panel [[id number]]  (Abnormal) Collected: 12/19/22 0511    Specimen: Blood Updated: 12/19/22 0637     Glucose 213 mg/dL      BUN 18 mg/dL      Creatinine 0.97 mg/dL      Sodium 131 mmol/L      Potassium 4.3 mmol/L      Chloride 92 mmol/L      CO2 29.5 mmol/L      Calcium 9.1 mg/dL      Total Protein 6.5 g/dL      Albumin 3.50 g/dL      ALT (SGPT) 19 U/L      AST (SGOT) 20 U/L      Alkaline Phosphatase 87 U/L      Total Bilirubin 0.4 mg/dL      Globulin 3.0 gm/dL      A/G Ratio 1.2 g/dL      BUN/Creatinine Ratio 18.6     Anion Gap 9.5 mmol/L      eGFR 57.7 mL/min/1.73      Comment: National Kidney Foundation and American Society of  Nephrology (ASN) Task Force recommended calculation based on the Chronic Kidney Disease Epidemiology Collaboration (CKD-EPI) equation refit without adjustment for race.       Narrative:      GFR Normal >60  Chronic Kidney Disease <60  Kidney Failure <15    The GFR formula is only valid for adults with stable renal function between ages 18 and 70.    Lipid Panel [[id number]]  (Abnormal) Collected: 12/19/22 0511    Specimen: Blood Updated: 12/19/22 0635     Total Cholesterol 240 mg/dL      Triglycerides 185 mg/dL      HDL Cholesterol 50 mg/dL      LDL Cholesterol  156 mg/dL      VLDL Cholesterol 34 mg/dL      LDL/HDL Ratio 3.06    Narrative:      Cholesterol Reference Ranges  (U.S. Department of Health and Human Services ATP III Classifications)    Desirable          <200 mg/dL  Borderline High    200-239 mg/dL  High Risk          >240 mg/dL      Triglyceride Reference Ranges  (U.S. Department of Health and Human Services ATP III Classifications)    Normal           <150 mg/dL  Borderline High  150-199 mg/dL  High             200-499 mg/dL  Very High        >500 mg/dL    HDL Reference Ranges  (U.S. Department of Health and Human Services ATP III Classifications)    Low     <40 mg/dl (major risk factor for CHD)  High    >60 mg/dl ('negative' risk factor for CHD)        LDL Reference Ranges  (U.S. Department of Health and Human Services ATP III Classifications)    Optimal          <100 mg/dL  Near Optimal     100-129 mg/dL  Borderline High  130-159 mg/dL  High             160-189 mg/dL  Very High        >189 mg/dL    POC Glucose Once [[id number]]  (Abnormal) Collected: 12/19/22 0631    Specimen: Blood Updated: 12/19/22 0632     Glucose 192 mg/dL      Comment: Meter: [id number] : [id number] Shelbie DIAS       CBC & Differential [[id number]]  (Abnormal) Collected: 12/19/22 0511    Specimen: Blood Updated: 12/19/22 0614    Narrative:      The following orders were created for panel order CBC &  Differential.  Procedure                               Abnormality         Status                     ---------                               -----------         ------                     CBC Auto Differential[[id number]]        Abnormal            Final result                 Please view results for these tests on the individual orders.    CBC Auto Differential [[id number]]  (Abnormal) Collected: 12/19/22 0511    Specimen: Blood Updated: 12/19/22 0614     WBC 10.20 10*3/mm3      RBC 4.04 10*6/mm3      Hemoglobin 12.4 g/dL      Hematocrit 38.1 %      MCV 94.3 fL      MCH 30.7 pg      MCHC 32.5 g/dL      RDW 11.8 %      RDW-SD 40.9 fl      MPV 10.4 fL      Platelets 260 10*3/mm3      Neutrophil % 61.4 %      Lymphocyte % 23.3 %      Monocyte % 14.3 %      Eosinophil % 0.1 %      Basophil % 0.3 %      Immature Grans % 0.6 %      Neutrophils, Absolute 6.26 10*3/mm3      Lymphocytes, Absolute 2.38 10*3/mm3      Monocytes, Absolute 1.46 10*3/mm3      Eosinophils, Absolute 0.01 10*3/mm3      Basophils, Absolute 0.03 10*3/mm3      Immature Grans, Absolute 0.06 10*3/mm3      nRBC 0.0 /100 WBC     TSH [[id number]]  (Normal) Collected: 12/19/22 0511    Specimen: Blood Updated: 12/19/22 0607     TSH 2.830 uIU/mL     Hemoglobin A1c [[id number]]  (Abnormal) Collected: 12/19/22 0511    Specimen: Blood Updated: 12/19/22 0554     Hemoglobin A1C 7.90 %     Narrative:      Hemoglobin A1C Ranges:    Increased Risk for Diabetes  5.7% to 6.4%  Diabetes                     >= 6.5%  Diabetic Goal                < 7.0%    POC Glucose Once [[id number]]  (Abnormal) Collected: 12/18/22 2122    Specimen: Blood Updated: 12/18/22 2123     Glucose 263 mg/dL      Comment: Meter: [id number] : [id number] Danielle DIAS       Vitamin D,25-Hydroxy [[id number]]  (Normal) Collected: 12/18/22 1536    Specimen: Blood from Arm, Left Updated: 12/18/22 1710     25 Hydroxy, Vitamin D 40.8 ng/ml     Narrative:      Reference Range for Total Vitamin D 25(OH)      Deficiency <20.0 ng/mL   Insufficiency 21-29 ng/mL   Sufficiency  ng/mL  Toxicity >100 ng/ml    Results may be falsely increased if patient taking Biotin.      POC Glucose Once [[id number]]  (Abnormal) Collected: 12/18/22 1705    Specimen: Blood Updated: 12/18/22 1706     Glucose 287 mg/dL      Comment: Meter: [id number] : [id number] Imani Johnson RN       BNP [[id number]]  (Normal) Collected: 12/18/22 1536    Specimen: Blood from Arm, Left Updated: 12/18/22 1647     proBNP 47.1 pg/mL     Narrative:      Among patients with dyspnea, NT-proBNP is highly sensitive for the detection of acute congestive heart failure. In addition NT-proBNP of <300 pg/ml effectively rules out acute congestive heart failure with 99% negative predictive value.    Results may be falsely decreased if patient taking Biotin.      POC Glucose Once [[id number]]  (Abnormal) Collected: 12/18/22 1232    Specimen: Blood Updated: 12/18/22 1233     Glucose 258 mg/dL      Comment: Meter: [id number] : [id number] Dimas Adams RN       POC Glucose Once [[id number]]  (Abnormal) Collected: 12/18/22 0612    Specimen: Blood Updated: 12/18/22 0614     Glucose 235 mg/dL      Comment: Meter: [id number] : [id number] Dereck DIAS       POC Glucose Once [[id number]]  (Abnormal) Collected: 12/17/22 2109    Specimen: Blood Updated: 12/17/22 2111     Glucose 241 mg/dL      Comment: Meter: [id number] : [id number] Dereck DIAS       Comprehensive Metabolic Panel [[id number]]  (Abnormal) Collected: 12/17/22 1723    Specimen: Blood Updated: 12/17/22 1756     Glucose 248 mg/dL      BUN 15 mg/dL      Creatinine 0.90 mg/dL      Sodium 137 mmol/L      Potassium 4.6 mmol/L      Chloride 97 mmol/L      CO2 25.0 mmol/L      Calcium 9.2 mg/dL      Total Protein 6.8 g/dL      Albumin 3.80 g/dL      ALT (SGPT) 30 U/L      AST (SGOT) 30 U/L      Alkaline Phosphatase 112 U/L      Total Bilirubin 0.3 mg/dL      Globulin 3.0 gm/dL      A/G Ratio 1.3 g/dL       BUN/Creatinine Ratio 16.7     Anion Gap 15.0 mmol/L      eGFR 63.2 mL/min/1.73      Comment: National Kidney Foundation and American Society of Nephrology (ASN) Task Force recommended calculation based on the Chronic Kidney Disease Epidemiology Collaboration (CKD-EPI) equation refit without adjustment for race.       Narrative:      GFR Normal >60  Chronic Kidney Disease <60  Kidney Failure <15    The GFR formula is only valid for adults with stable renal function between ages 18 and 70.    Protime-INR [[id number]]  (Normal) Collected: 12/17/22 1723    Specimen: Blood Updated: 12/17/22 1749     Protime 13.3 Seconds      INR 1.00    aPTT [[id number]]  (Normal) Collected: 12/17/22 1723    Specimen: Blood Updated: 12/17/22 1749     PTT 25.8 seconds     CBC & Differential [[id number]]  (Abnormal) Collected: 12/17/22 1723    Specimen: Blood Updated: 12/17/22 1741    Narrative:      The following orders were created for panel order CBC & Differential.  Procedure                               Abnormality         Status                     ---------                               -----------         ------                     CBC Auto Differential[[id number]]        Abnormal            Final result                 Please view results for these tests on the individual orders.    CBC Auto Differential [[id number]]  (Abnormal) Collected: 12/17/22 1723    Specimen: Blood Updated: 12/17/22 1741     WBC 13.44 10*3/mm3      RBC 4.99 10*6/mm3      Hemoglobin 15.6 g/dL      Hematocrit 45.7 %      MCV 91.6 fL      MCH 31.3 pg      MCHC 34.1 g/dL      RDW 11.9 %      RDW-SD 40.2 fl      MPV 10.4 fL      Platelets 279 10*3/mm3      Neutrophil % 80.9 %      Lymphocyte % 11.5 %      Monocyte % 6.3 %      Eosinophil % 0.5 %      Basophil % 0.4 %      Immature Grans % 0.4 %      Neutrophils, Absolute 10.88 10*3/mm3      Lymphocytes, Absolute 1.54 10*3/mm3      Monocytes, Absolute 0.84 10*3/mm3      Eosinophils, Absolute 0.07 10*3/mm3       Basophils, Absolute 0.05 10*3/mm3      Immature Grans, Absolute 0.06 10*3/mm3      nRBC 0.0 /100 WBC              PHYSICAL EXAM: Left hip and thigh dressings remain dry and intact.  Moderate swelling to the thigh however compartments remain soft.  Calf is soft and nontender.  Has good motion sensation to her foot and ankle however she does have a very tight heel cord.  Slow progress with PT.  Patient is to remain strict 50% partial weightbearing on that left lower extremity.  Abdomen is soft with bowel sounds.  Passing gas but no BM.  Voiding via pure wick.    ASSESSMENT & PLAN:  Continue to mobilize patient with PT as she can tolerate.  Aspirin for DVT prophylaxis.  Patient to be transferred to subacute rehab anytime from an orthopedic standpoint and when medically stable    Date: 12/20/2022    Gabby Og RN        I have reviewed and agree with the above.    Williams Church MD

## 2022-12-20 NOTE — PROGRESS NOTES
Daily progress note    Primary care physician      Chief complaint  Doing better with no new complaints except hurting at the surgical site.    History of present illness  82-year-old white female with history of severe dementia diabetes mellitus hypertension hypothyroidism and severe osteoarthritis who is a nursing home resident brought to the emergency room after the mechanical fall with left knee pain.  Patient evaluated in ER found to have left hip fracture admit for management.  At the time of interview patient is awake and alert in mild distress secondary to pain and unable to give detailed history and most of the history obtained from the family.  Patient apparently did not lose consciousness or hit her head and denies any chest pain shortness of breath palpitation dizziness lightheadedness prior to the fall to the nursing staff but feels weak.  Patient is DNR per her wishes      REVIEW OF SYSTEMS  Unremarkable except for pain    PHYSICAL EXAM  Blood pressure 108/62, pulse 81, temperature 98.5 °F (36.9 °C), temperature source Oral, resp. rate 16, height 165.1 cm (65\"), weight 89.1 kg (196 lb 6.9 oz), SpO2 93 %.    GENERAL: Awake and alert, mild distress  HEENT:  Unremarkable  NECK:  Supple  CV: regular rhythm, normal rate, distal pulses symmetric and palpable  RESPIRATORY: normal effort moving air bilaterally  ABDOMEN: soft, nondistended nontender with normal bowel sound  MUSCULOSKELETAL: no deformity.  No obvious deformity, but the leg is held in flexion due to discomfort.  There is tenderness palpation of the hip and knee but no obvious deformity and significant pain with range of motion especially at the hip.    NEURO: alert, moves all extremities, follows commands  PSYCH:  calm, cooperative  SKIN: warm, dry with no rash     LAB RESULTS  Lab Results (last 24 hours)     Procedure Component Value Units Date/Time    POC Glucose Once [[id number]]  (Abnormal) Collected: 12/20/22 2893    Specimen: Blood  Updated: 12/20/22 1045     Glucose 182 mg/dL      Comment: Meter: [id number] : [id number] Nelly Funes EMILY       Basic Metabolic Panel [[id number]]  (Abnormal) Collected: 12/20/22 0531    Specimen: Blood Updated: 12/20/22 0640     Glucose 200 mg/dL      BUN 20 mg/dL      Creatinine 0.71 mg/dL      Sodium 134 mmol/L      Potassium 4.3 mmol/L      Comment: Slight hemolysis detected by analyzer. Results may be affected.        Chloride 97 mmol/L      CO2 28.5 mmol/L      Calcium 8.6 mg/dL      BUN/Creatinine Ratio 28.2     Anion Gap 8.5 mmol/L      eGFR 84.0 mL/min/1.73      Comment: National Kidney Foundation and American Society of Nephrology (ASN) Task Force recommended calculation based on the Chronic Kidney Disease Epidemiology Collaboration (CKD-EPI) equation refit without adjustment for race.       Narrative:      GFR Normal >60  Chronic Kidney Disease <60  Kidney Failure <15    The GFR formula is only valid for adults with stable renal function between ages 18 and 70.    POC Glucose Once [[id number]]  (Abnormal) Collected: 12/20/22 0621    Specimen: Blood Updated: 12/20/22 0624     Glucose 179 mg/dL      Comment: Meter: [id number] : [id number] Shelbie DIAS       CBC & Differential [[id number]]  (Abnormal) Collected: 12/20/22 0531    Specimen: Blood Updated: 12/20/22 0616    Narrative:      The following orders were created for panel order CBC & Differential.  Procedure                               Abnormality         Status                     ---------                               -----------         ------                     CBC Auto Differential[[id number]]        Abnormal            Final result                 Please view results for these tests on the individual orders.    CBC Auto Differential [[id number]]  (Abnormal) Collected: 12/20/22 0531    Specimen: Blood Updated: 12/20/22 0616     WBC 10.18 10*3/mm3      RBC 3.93 10*6/mm3      Hemoglobin 12.1 g/dL      Hematocrit 36.1 %      MCV  91.9 fL      MCH 30.8 pg      MCHC 33.5 g/dL      RDW 11.6 %      RDW-SD 39.3 fl      MPV 10.3 fL      Platelets 238 10*3/mm3      Neutrophil % 60.8 %      Lymphocyte % 23.0 %      Monocyte % 12.1 %      Eosinophil % 2.4 %      Basophil % 0.6 %      Immature Grans % 1.1 %      Neutrophils, Absolute 6.20 10*3/mm3      Lymphocytes, Absolute 2.34 10*3/mm3      Monocytes, Absolute 1.23 10*3/mm3      Eosinophils, Absolute 0.24 10*3/mm3      Basophils, Absolute 0.06 10*3/mm3      Immature Grans, Absolute 0.11 10*3/mm3      nRBC 0.0 /100 WBC     POC Glucose Once [[id number]]  (Abnormal) Collected: 12/19/22 2148    Specimen: Blood Updated: 12/19/22 2149     Glucose 205 mg/dL      Comment: Meter: [id number] : [id number] Shelbie DIAS       POC Glucose Once [[id number]]  (Abnormal) Collected: 12/19/22 1642    Specimen: Blood Updated: 12/19/22 1643     Glucose 175 mg/dL      Comment: Meter: [id number] : [id number] Nelly Funes PCA           Imaging Results (Last 24 Hours)     ** No results found for the last 24 hours. **           ECG 12 Lead   Component   Ref Range & Units 1 d ago    QT Interval   ms 381    Resulting Agency  ECG             HEART RATE= 86  bpm  RR Interval= 698  ms  IN Interval= 166  ms  P Horizontal Axis= -11  deg  P Front Axis= 46  deg  QRSD Interval= 99  ms  QT Interval= 381  ms  QRS Axis= 25  deg  T Wave Axis= 50  deg  - BORDERLINE ECG -  Sinus rhythm  Probable left atrial enlargement  Low voltage, precordial leads  No Prior Tracing for Comparison               Current Facility-Administered Medications:   •  acetaminophen (TYLENOL) tablet 325 mg, 325 mg, Oral, Q4H PRN, Williams Church MD  •  aspirin EC tablet 81 mg, 81 mg, Oral, Q12H, Williams Church MD, 81 mg at 12/20/22 0822  •  atorvastatin (LIPITOR) tablet 10 mg, 10 mg, Oral, Nightly, Rafael Acharya MD, 10 mg at 12/19/22 2124  •  docusate sodium (COLACE) capsule 100 mg, 100 mg, Oral, BID PRN, Williams Church MD, 100 mg at 12/19/22  0918  •  HYDROcodone-acetaminophen (NORCO) 5-325 MG per tablet 1 tablet, 1 tablet, Oral, Q4H PRN, Katia Acharya MD, 1 tablet at 12/20/22 0822  •  insulin glargine (LANTUS, SEMGLEE) injection 15 Units, 15 Units, Subcutaneous, Nightly, Katia Acharya MD, 15 Units at 12/19/22 2237  •  insulin lispro (ADMELOG) injection 0-7 Units, 0-7 Units, Subcutaneous, 4x Daily With Meals & Nightly, Katia Acharya MD, 2 Units at 12/20/22 0822  •  insulin lispro (ADMELOG) injection 5 Units, 5 Units, Subcutaneous, TID With Meals, Katia Acharya MD, 5 Units at 12/20/22 0822  •  levothyroxine (SYNTHROID, LEVOTHROID) tablet 50 mcg, 50 mcg, Oral, Daily, Katia Acharya MD, 50 mcg at 12/20/22 0822  •  memantine (NAMENDA) tablet 10 mg, 10 mg, Oral, Nightly, Katia Acharya MD, 10 mg at 12/19/22 2124  •  [DISCONTINUED] ondansetron (ZOFRAN) tablet 4 mg, 4 mg, Oral, Q6H PRN **OR** ondansetron (ZOFRAN) injection 4 mg, 4 mg, Intravenous, Q6H PRN, Williams Church MD  •  pantoprazole (PROTONIX) EC tablet 40 mg, 40 mg, Oral, Q AM, Katia Acharya MD  •  polyethylene glycol (MIRALAX) packet 17 g, 17 g, Oral, Daily, Williams Church MD, 17 g at 12/20/22 0822  •  saccharomyces boulardii (FLORASTOR) capsule 250 mg, 250 mg, Oral, Daily, Katia Acharya MD, 250 mg at 12/20/22 0822  •  sertraline (ZOLOFT) tablet 25 mg, 25 mg, Oral, Nightly, Katia Acharya MD, 25 mg at 12/19/22 2124  •  [COMPLETED] Insert Peripheral IV, , , Once **AND** sodium chloride 0.9 % flush 10 mL, 10 mL, Intravenous, PRN, Williams Church MD  •  sodium chloride 0.9 % flush 10 mL, 10 mL, Intravenous, PRN, Williams Church MD  •  sodium chloride 0.9 % infusion 40 mL, 40 mL, Intravenous, PRN, Williams Church MD     ASSESSMENT  Left intertrochanteric femur fracture s/p cephalomedullary fixation  Severe dementia  Diabetes mellitus  Hypertension  Hypothyroidism  Osteoarthritis  Depression  Gastroesophageal reflux disease    PLAN  Discharge to subacute rehab  Discharge summary dictated    KATIA  KRISTY SZYMANSKI

## 2022-12-29 NOTE — PROGRESS NOTES
Enter Query Response Below      Query Response:       Postop anemia with no clinical significant Electronically signed by Rafael Acharya MD, 22, 10:44 AM EST.         If applicable, please update the problem list.         Patient: Velma Bangura        : 1940  Account: [id number]           Admit Date: 2022        How to Respond to this query:       a. Click New Note     b. Answer query within the yellow box.                c. Update the Problem List, if applicable.      If you have any questions about this query contact me at: [email or web address]     Dr. Acharya,    82 yr old admitted with left intertrochanteric femur fracture that underwent cephalomedullary fixation of  left intertrochanteric femur fracture. The patient's hemoglobin on admission was 15.6 then after surgery 12.4 and 12.1. The op note documents estimated blood loss of <50ml. The patient's systolic blood pressure was 100-120s and HR 60-90s. The patient was treated with monitoring labs.    Can the patient's condition be further specified as:     - Postoperative blood loss anemia-not clinically significant   - Postoperative blood loss anemia-clinically significant  - Lab values of no clinical significance  - Other (please specify) ____________  - Unable to determine    By submitting this query, we are merely seeking further clarification of documentation to accurately reflect all conditions that you are monitoring, evaluating, treating or that extend the hospitalization or utilize additional resources of care. Please utilize your independent clinical judgment when addressing the question(s) above.     This query and your response, once completed, will be entered into the legal medical record.    Sincerely,  Gracy Frye RN  Clinical Documentation Integrity Program

## 2023-02-01 ENCOUNTER — TELEPHONE (OUTPATIENT)
Dept: ORTHOPEDIC SURGERY | Facility: CLINIC | Age: 83
End: 2023-02-01

## 2023-02-01 NOTE — TELEPHONE ENCOUNTER
Caller: PATIENT'S DAUGHTER IN LAW ANJELICA   Relationship to patient: DAUGHTER IN LAW     Best call back number: [phone]    Chief complaint: POST OP AFTER LEFT HIP SURGERY 12/18/23     Type of visit: POST OP     Requested date: ASAP       Additional notes:PT'S DAUGHTER IN LAW CALLING.   PT. HAD LEFT HIP SURGERY WITH DR. KRAUS ON 12/18/23.   [facility]   PT. HAS NOT BEEN SEEN BY DR. KRAUS SINCE HER SURGERY.   NEEDS A POST OP VISIT.   DOES PT NEED TO BE WORKED IN SOON?   PLEASE CALL TO ADVISE.

## 2023-02-07 ENCOUNTER — TELEPHONE (OUTPATIENT)
Dept: ORTHOPEDIC SURGERY | Facility: CLINIC | Age: 83
End: 2023-02-07
Payer: MEDICARE

## 2023-02-07 ENCOUNTER — OFFICE VISIT (OUTPATIENT)
Dept: ORTHOPEDIC SURGERY | Facility: CLINIC | Age: 83
End: 2023-02-07
Payer: MEDICARE

## 2023-02-07 VITALS — HEIGHT: 65 IN | BODY MASS INDEX: 33.15 KG/M2 | TEMPERATURE: 97.1 F | WEIGHT: 199 LBS

## 2023-02-07 DIAGNOSIS — S72.002A CLOSED FRACTURE OF LEFT HIP, INITIAL ENCOUNTER: ICD-10-CM

## 2023-02-07 DIAGNOSIS — S72.22XD CLOSED DISPLACED SUBTROCHANTERIC FRACTURE OF LEFT FEMUR WITH ROUTINE HEALING, SUBSEQUENT ENCOUNTER: Primary | ICD-10-CM

## 2023-02-07 PROCEDURE — 73502 X-RAY EXAM HIP UNI 2-3 VIEWS: CPT | Performed by: ORTHOPAEDIC SURGERY

## 2023-02-07 PROCEDURE — 99024 POSTOP FOLLOW-UP VISIT: CPT | Performed by: ORTHOPAEDIC SURGERY

## 2023-02-07 NOTE — TELEPHONE ENCOUNTER
Caller: RADHA STEINBERG    Relationship: THE [facility]    Best call back number: [phone]    What was the call regarding: THE PATIENT IS CURRENTLY AT PARTIAL WEIGHTBARING STATUS BUT RADHA WOULD LIKE TO KNOW IF THE PATIENT'S WEIGHT BARING STATUS HAS BEEN CHANGED TO AS TOLERATED? RADHA STATED HIS REPORT DID NOT MENTION WEIGHT BARING STATUS    Do you require a callback: FAX:  [phone]

## 2023-02-07 NOTE — PROGRESS NOTES
"Velma Bangura     : 1940     MRN: [id number]     DATE: 2023    CC:  6 weeks status post cephalo-medullary fixation of intertrochanteric femur fracture    HPI: Patient is approximately 6 weeks out from surgery.  Patient is currently in a rehab facility she is been struggling some with therapy which has only been about 20 to 30 minutes a day 5 days a week.  She really did not start therapy until after 2 weeks post injury as she was transitioning from her home to rehab facility and getting going in their system.  She did have a heel pressure sore that is being addressed by them and doing better.  She states her incision sites are well-healed.  No overt hip pain.  She had some knee pain/discomfort which she did have prior to the injury but things are little more pronounced now when she tries to get up and ambulate and has been struggling a little bit.  She also states she does feel little bit more weakness family is here and is in agreement they witnessed some of her therapy sessions they do feel she is improving some but we are just still struggling to mobilize.    Vitals:    23 0957   Temp: 97.1 °F (36.2 °C)   Weight: 90.3 kg (199 lb)   Height: 165.1 cm (65\")       Physical exam:  Incisions are healed.  No erythema or drainage.  Motion is improved negative Homans.  Normal motor and sensory function distally.  Good pedal pulses with brisk cap refill.  We are able to have the patient's stand with assistance she is able take a few steps no significant pain noted but she did feel very weak muscles were shaking and quickly fatigued.  On testing the muscle strength they were 3+ to 4- out of 5 at best to all muscle groups tested.  Hip range of motion was appropriate no significant discomfort noted.    Imaging  AP and lateral views of the left hip and knee are ordered and reviewed for comparison purposes and to evaluate healing.  X-rays show alignment unchanged.  Evidence of healing with interval callus " formation.  Overall alignment satisfactory.    Impression:  6 weeks s/p cephalo-medullary fixation of left intertrochanteric femur fracture    Plan:    1.  Continue PT: I recommend continuing PT for strengthening and mobilization.  I think the patient would benefit from doing several sessions each day if the facility is able.  Also if they could have her do some exercises on her own at least several times throughout the day to help her strengthening and conditioning.  Feel free to call me with any questions or concerns  2.  OK to discontinue DVT prophylaxis however can continue to take calcium and vitamin D for bone healing and health.  Tylenol as needed for pain but she may benefit from a dose prior to any PT sessions.  Ice as needed for any pain or swelling.  Diclofenac gel to the anterior part of the knee could help some relief as well if she can tolerate.  3.  Follow up in 4 weeks with repeat 2v x-rays of hip and knee.    Williams Church MD    02/07/2023

## 2023-02-08 ENCOUNTER — TELEPHONE (OUTPATIENT)
Dept: ORTHOPEDIC SURGERY | Facility: CLINIC | Age: 83
End: 2023-02-08
Payer: MEDICARE

## 2023-02-08 NOTE — TELEPHONE ENCOUNTER
Verified per Dr Church, patient ok to weight bear as tolerated for PT. Facility requested that in a fax, sent to provided fax number [phone]

## 2023-03-07 ENCOUNTER — OFFICE VISIT (OUTPATIENT)
Dept: ORTHOPEDIC SURGERY | Facility: CLINIC | Age: 83
End: 2023-03-07
Payer: MEDICARE

## 2023-03-07 VITALS — HEIGHT: 66 IN | BODY MASS INDEX: 32.11 KG/M2 | TEMPERATURE: 97.5 F | WEIGHT: 199.8 LBS

## 2023-03-07 DIAGNOSIS — S72.22XD CLOSED DISPLACED SUBTROCHANTERIC FRACTURE OF LEFT FEMUR WITH ROUTINE HEALING, SUBSEQUENT ENCOUNTER: Primary | ICD-10-CM

## 2023-03-07 PROCEDURE — 73502 X-RAY EXAM HIP UNI 2-3 VIEWS: CPT | Performed by: ORTHOPAEDIC SURGERY

## 2023-03-07 PROCEDURE — 99024 POSTOP FOLLOW-UP VISIT: CPT | Performed by: ORTHOPAEDIC SURGERY

## 2023-03-07 NOTE — PROGRESS NOTES
Patient: Velma Bangura  YOB: 1940 82 y.o. female  Medical Record Number: [id number]    Chief Complaint:   Chief Complaint   Patient presents with   • Left Hip - Follow-up       History of Present Illness:Velma Bangura is a 82 y.o. female who presents for follow-up of left hip fracture.  She is now 3 months status post rodding for left hip fracture.  She is here with her family.  They state she is continue to progress some she walked around with a walker a little bit but still not where she was prior to injury.  States she complains of her knee at times.    Allergies: No Known Allergies    Medications:   Current Outpatient Medications   Medication Sig Dispense Refill   • aspirin 81 MG EC tablet Take 1 tablet by mouth Daily. Take 1 tab po q 12 hours X 2 weeks then resume 1 tab po daily     • docusate sodium 100 MG capsule Take 1 capsule by mouth 2 (Two) Times a Day.     • Florastor 250 MG capsule      • levothyroxine (SYNTHROID, LEVOTHROID) 50 MCG tablet Take 1 tablet by mouth Daily.     • memantine (NAMENDA) 10 MG tablet Take 1 tablet by mouth Every Night.     • multivitamin with minerals tablet tablet Take 1 tablet by mouth Daily.     • ondansetron (ZOFRAN) 4 MG tablet Take 1 tablet by mouth every 6 (six) hours as needed for nausea or vomiting. 14 tablet 0   • polyethylene glycol (MIRALAX) 17 g packet Take 17 g by mouth Daily.     • senna (SENOKOT) 8.6 MG tablet Take 2 tablets by mouth 2 (Two) Times a Day As Needed for Constipation.     • sertraline (ZOLOFT) 25 MG tablet Take 1 tablet by mouth Daily.     • SIMVASTATIN PO Take  by mouth.     • insulin lispro (ADMELOG) 100 UNIT/ML injection Inject 7 Units under the skin into the appropriate area as directed 3 (Three) Times a Day With Meals for 30 days. 100 mL 0     No current facility-administered medications for this visit.         The following portions of the patient's history were reviewed and updated as appropriate: allergies, current medications,  "past family history, past medical history, past social history, past surgical history and problem list.    Review of Systems:   A 14 point review of systems was performed. All systems negative except pertinent positives/negative listed in HPI above    Physical Exam:   Vitals:    03/07/23 0959   Temp: 97.5 °F (36.4 °C)   Weight: 90.6 kg (199 lb 12.8 oz)   Height: 167.6 cm (66\")   PainSc:   1   PainLoc: Hip       General: A and O x 3, ASA, NAD    SCLERA:    Normal    DENTITION:   Normal    Left lower extremity she tolerates range of motion appropriately motor and sensory intact distally.  I did examine the knee as well appears to be stable varus valgus stress no overt instability noted.  No pain with range of motion.      Radiology:   AP and lateral films of the hip and knee were taken on the left side show continued healing of the fracture compared to previous films overall alignment satisfactory.  No other significant changes compared to previous films.    Assessment/Plan:  3-month status post cephalomedullary rodding left hip subtrochanteric/transtrochanteric fracture    Patient appears to be doing well overall she is progressing little slower with therapy but headed in the right direction.  At this point I recommend continuing therapy working on strength and endurance.  I think continued therapy is beneficial to her if she is running out of her therapy per her family reports so told him to let us know if we can be helpful in that matter.  Otherwise patient may follow-up as needed.  All questions were answered.  Patient and family understand and agree with the plan.      Williams Church MD  3/7/2023    "

## 2024-09-04 ENCOUNTER — TRANSCRIBE ORDERS (OUTPATIENT)
Dept: ADMINISTRATIVE | Facility: HOSPITAL | Age: 84
End: 2024-09-04
Payer: MEDICARE

## 2024-09-04 DIAGNOSIS — N64.9 BREAST LESION: Primary | ICD-10-CM

## 2024-09-05 ENCOUNTER — TRANSCRIBE ORDERS (OUTPATIENT)
Dept: ADMINISTRATIVE | Facility: HOSPITAL | Age: 84
End: 2024-09-05
Payer: MEDICARE

## 2024-09-05 DIAGNOSIS — N64.9 BREAST LESION: Primary | ICD-10-CM

## 2024-10-08 ENCOUNTER — HOSPITAL ENCOUNTER (OUTPATIENT)
Dept: MAMMOGRAPHY | Facility: HOSPITAL | Age: 84
Discharge: HOME | End: 2024-10-08
Payer: MEDICARE

## 2024-10-08 ENCOUNTER — HOSPITAL ENCOUNTER (OUTPATIENT)
Dept: ULTRASOUND IMAGING | Facility: HOSPITAL | Age: 84
Discharge: HOME | End: 2024-10-08
Payer: MEDICARE

## 2024-10-08 VITALS
SYSTOLIC BLOOD PRESSURE: 134 MMHG | OXYGEN SATURATION: 95 % | RESPIRATION RATE: 16 BRPM | TEMPERATURE: 97.8 F | DIASTOLIC BLOOD PRESSURE: 78 MMHG | HEART RATE: 75 BPM

## 2024-10-08 DIAGNOSIS — N64.9 BREAST LESION: ICD-10-CM

## 2024-10-08 DIAGNOSIS — R92.8 ABNORMAL MAMMOGRAM OF LEFT BREAST: ICD-10-CM

## 2024-10-08 LAB — GLUCOSE BLDC GLUCOMTR-MCNC: 147 MG/DL (ref 70–130)

## 2024-10-08 PROCEDURE — 82948 REAGENT STRIP/BLOOD GLUCOSE: CPT

## 2024-10-08 PROCEDURE — 88360 TUMOR IMMUNOHISTOCHEM/MANUAL: CPT | Performed by: FAMILY MEDICINE

## 2024-10-08 PROCEDURE — 88342 IMHCHEM/IMCYTCHM 1ST ANTB: CPT | Performed by: FAMILY MEDICINE

## 2024-10-08 PROCEDURE — 25010000002 LIDOCAINE-EPINEPHRINE 1 %-1:200000 SOLUTION: Performed by: FAMILY MEDICINE

## 2024-10-08 PROCEDURE — 88341 IMHCHEM/IMCYTCHM EA ADD ANTB: CPT | Performed by: FAMILY MEDICINE

## 2024-10-08 PROCEDURE — 77066 DX MAMMO INCL CAD BI: CPT

## 2024-10-08 PROCEDURE — A4648 IMPLANTABLE TISSUE MARKER: HCPCS

## 2024-10-08 PROCEDURE — 76942 ECHO GUIDE FOR BIOPSY: CPT

## 2024-10-08 PROCEDURE — 88305 TISSUE EXAM BY PATHOLOGIST: CPT | Performed by: FAMILY MEDICINE

## 2024-10-08 PROCEDURE — 25010000002 LIDOCAINE 1 % SOLUTION: Performed by: FAMILY MEDICINE

## 2024-10-08 PROCEDURE — G0279 TOMOSYNTHESIS, MAMMO: HCPCS

## 2024-10-08 PROCEDURE — 76641 ULTRASOUND BREAST COMPLETE: CPT

## 2024-10-08 RX ORDER — LIDOCAINE HYDROCHLORIDE 10 MG/ML
3 INJECTION, SOLUTION INFILTRATION; PERINEURAL ONCE
Status: COMPLETED | OUTPATIENT
Start: 2024-10-08 | End: 2024-10-08

## 2024-10-08 RX ADMIN — LIDOCAINE HYDROCHLORIDE 8 ML: 10; .005 INJECTION, SOLUTION EPIDURAL; INFILTRATION; INTRACAUDAL; PERINEURAL at 15:05

## 2024-10-08 RX ADMIN — LIDOCAINE HYDROCHLORIDE 7 ML: 10; .005 INJECTION, SOLUTION EPIDURAL; INFILTRATION; INTRACAUDAL; PERINEURAL at 15:20

## 2024-10-08 RX ADMIN — LIDOCAINE HYDROCHLORIDE 2 ML: 10 INJECTION, SOLUTION INFILTRATION; PERINEURAL at 15:05

## 2024-10-08 RX ADMIN — LIDOCAINE HYDROCHLORIDE 2 ML: 10 INJECTION, SOLUTION INFILTRATION; PERINEURAL at 15:20

## 2024-10-08 NOTE — NURSING NOTE
Dr. Watson in would patient needs a lt axila and lt breast biopsy. Patient worked up and prepped for the procedure.

## 2024-10-08 NOTE — NURSING NOTE
Biopsy sites to left middle and outer breast clear with Exofin dry and intact. No firmness or swelling noted at or around biopsy site. Denies pain. Ice pack with protective covering applied to biopsy site. Discharge instructions discussed with understanding voiced by patient. Copies provided to patient. No distress noted. To home via personal vehicle.

## 2024-10-10 LAB
CYTO UR: NORMAL
CYTO UR: NORMAL
LAB AP CASE REPORT: NORMAL
LAB AP CASE REPORT: NORMAL
LAB AP CLINICAL INFORMATION: NORMAL
LAB AP CLINICAL INFORMATION: NORMAL
LAB AP DIAGNOSIS COMMENT: NORMAL
LAB AP DIAGNOSIS COMMENT: NORMAL
LAB AP INTRADEPARTMENTAL CONSULT: NORMAL
LAB AP INTRADEPARTMENTAL CONSULT: NORMAL
LAB AP SPECIAL STAINS: NORMAL
LAB AP SPECIAL STAINS: NORMAL
LAB AP SYNOPTIC CHECKLIST: NORMAL
LAB AP SYNOPTIC CHECKLIST: NORMAL
PATH REPORT.FINAL DX SPEC: NORMAL
PATH REPORT.FINAL DX SPEC: NORMAL
PATH REPORT.GROSS SPEC: NORMAL
PATH REPORT.GROSS SPEC: NORMAL

## 2024-10-16 LAB
CYTO UR: NORMAL
CYTO UR: NORMAL
LAB AP CASE REPORT: NORMAL
LAB AP CASE REPORT: NORMAL
LAB AP CLINICAL INFORMATION: NORMAL
LAB AP CLINICAL INFORMATION: NORMAL
LAB AP DIAGNOSIS COMMENT: NORMAL
LAB AP DIAGNOSIS COMMENT: NORMAL
LAB AP INTRADEPARTMENTAL CONSULT: NORMAL
LAB AP INTRADEPARTMENTAL CONSULT: NORMAL
LAB AP SPECIAL STAINS: NORMAL
LAB AP SPECIAL STAINS: NORMAL
LAB AP SYNOPTIC CHECKLIST: NORMAL
LAB AP SYNOPTIC CHECKLIST: NORMAL
PATH REPORT.ADDENDUM SPEC: NORMAL
PATH REPORT.ADDENDUM SPEC: NORMAL
PATH REPORT.FINAL DX SPEC: NORMAL
PATH REPORT.FINAL DX SPEC: NORMAL
PATH REPORT.GROSS SPEC: NORMAL
PATH REPORT.GROSS SPEC: NORMAL

## 2024-11-01 ENCOUNTER — DOCUMENTATION (OUTPATIENT)
Dept: SURGERY | Facility: CLINIC | Age: 84
End: 2024-11-01
Payer: MEDICARE

## 2024-11-01 NOTE — PROGRESS NOTES
Called and spoke with patients sara Hensley and set up patient for 11/14 at 9:00 am    New patient packet mailed to son's address listed under his contact information    SD   Urinary tract infection, site unspecified

## 2024-11-07 ENCOUNTER — TELEPHONE (OUTPATIENT)
Dept: SURGERY | Facility: CLINIC | Age: 84
End: 2024-11-07
Payer: MEDICARE

## 2024-11-07 NOTE — TELEPHONE ENCOUNTER
I called and spoke with Ms Willem son. He stated she is not in physical condition to do dx testing where she would be required to lay on a flat surface due to her mobility issues.    He is also working to locate any information from her previous cancer dx, they believe it has been at least 10-20 years and will keep us posted with what they can find out.    He will also let us know once he gets a doctor or hospital name for her past treatment    SD

## 2024-11-12 ENCOUNTER — PATIENT OUTREACH (OUTPATIENT)
Dept: ONCOLOGY | Facility: HOSPITAL | Age: 84
End: 2024-11-12
Payer: MEDICARE

## 2024-11-12 DIAGNOSIS — C50.912 MALIGNANT NEOPLASM OF LEFT BREAST IN FEMALE, ESTROGEN RECEPTOR POSITIVE, UNSPECIFIED SITE OF BREAST: Primary | ICD-10-CM

## 2024-11-12 DIAGNOSIS — Z17.0 MALIGNANT NEOPLASM OF LEFT BREAST IN FEMALE, ESTROGEN RECEPTOR POSITIVE, UNSPECIFIED SITE OF BREAST: Primary | ICD-10-CM

## 2024-11-12 NOTE — PROGRESS NOTES
Phoned pt's son and left message for him to return my call regarding appt scheduled with Dr. Lewis tomorrow 11/13.

## 2024-11-13 ENCOUNTER — TELEPHONE (OUTPATIENT)
Dept: SURGERY | Facility: CLINIC | Age: 84
End: 2024-11-13
Payer: MEDICARE

## 2024-11-13 ENCOUNTER — HOSPITAL ENCOUNTER (OUTPATIENT)
Dept: SURGERY | Facility: HOSPITAL | Age: 84
Discharge: HOME | End: 2024-11-13
Payer: COMMERCIAL

## 2024-11-13 ENCOUNTER — OFFICE VISIT (OUTPATIENT)
Dept: SURGERY | Facility: CLINIC | Age: 84
End: 2024-11-13
Payer: MEDICARE

## 2024-11-13 VITALS
HEART RATE: 74 BPM | DIASTOLIC BLOOD PRESSURE: 78 MMHG | HEIGHT: 66 IN | WEIGHT: 211 LBS | RESPIRATION RATE: 16 BRPM | OXYGEN SATURATION: 96 % | SYSTOLIC BLOOD PRESSURE: 130 MMHG | BODY MASS INDEX: 33.91 KG/M2

## 2024-11-13 DIAGNOSIS — G30.9 SEVERE ALZHEIMER'S DEMENTIA, UNSPECIFIED TIMING OF DEMENTIA ONSET, UNSPECIFIED WHETHER BEHAVIORAL, PSYCHOTIC, OR MOOD DISTURBANCE OR ANXIETY: ICD-10-CM

## 2024-11-13 DIAGNOSIS — C50.312 MALIGNANT NEOPLASM OF LOWER-INNER QUADRANT OF LEFT BREAST IN FEMALE, ESTROGEN RECEPTOR POSITIVE: Primary | ICD-10-CM

## 2024-11-13 DIAGNOSIS — Z17.0 MALIGNANT NEOPLASM OF LOWER-INNER QUADRANT OF LEFT BREAST IN FEMALE, ESTROGEN RECEPTOR POSITIVE: Primary | ICD-10-CM

## 2024-11-13 DIAGNOSIS — F02.C0 SEVERE ALZHEIMER'S DEMENTIA, UNSPECIFIED TIMING OF DEMENTIA ONSET, UNSPECIFIED WHETHER BEHAVIORAL, PSYCHOTIC, OR MOOD DISTURBANCE OR ANXIETY: ICD-10-CM

## 2024-11-13 RX ORDER — INSULIN GLARGINE 100 [IU]/ML
INJECTION, SOLUTION SUBCUTANEOUS DAILY
COMMUNITY

## 2024-11-13 RX ORDER — ATORVASTATIN CALCIUM 10 MG/1
10 TABLET, FILM COATED ORAL NIGHTLY
COMMUNITY

## 2024-11-13 RX ORDER — PANTOPRAZOLE SODIUM 40 MG/1
40 TABLET, DELAYED RELEASE ORAL DAILY
COMMUNITY

## 2024-11-13 RX ORDER — ACETAMINOPHEN 325 MG/1
650 TABLET ORAL EVERY 6 HOURS PRN
COMMUNITY

## 2024-11-13 NOTE — TELEPHONE ENCOUNTER
Called sonAyden to review CT/ Bone scan appointments and office follow up with Dr. Lewis after testing    Patient scheduled for CT/ Bone scan on 11/19 at [facility]  9:00 am injection for bone scan, 12:00 pm bone scan followed at 12:30 for CT abdomen, pelvis and chest.  Follow up in office with Dr. Lewis 12/4 at 1:00 pm      Unable to speak with son, left VM for him to call and review details/  SD

## 2024-11-13 NOTE — PROGRESS NOTES
[facility]     Referring Provider: MALA Franklin     Chief complaint: Newly diagnosed breast cancer    Subjective    HPI: Ms. Velma Bangura is a 84 yo woman, seen at the request of MALA Fritz, for a new diagnosis of left breast cancer.  She lives in a memory care assisted living facility.  About a month ago, the member of the facility staff noted a left breast lesion while performing her routine hygiene.  She subsequently underwent mammogram and ultrasound.  This demonstrated a left breast mass which is eroding through the skin as well as left axillary lymphadenopathy.  Biopsy of both the mass and lymph node showed triple positive invasive ductal carcinoma.    She is joined in clinic today by her son, Ayden, and daughter-in-law, Jimenez who are acting as historians.  The patient's dementia is fairly advanced and she is unable to participate in the conversation.  They report that she has a past history of left breast cancer in the late 90s.  She underwent a lumpectomy followed by radiation and took tamoxifen for period of time.  She did not require any chemotherapy.  They also report that upon completing tamoxifen she then took estrogen supplements for a while.  On exam she also appears to have also undergone a right breast surgery, however neither the patient nor her family know when this was done or what it was for.    Past medical history is also significant for diabetes on insulin.  She underwent hip surgery in 12/2022 for hip fracture.  She already had dementia at that time and they report that after anesthesia it took weeks before her cognitive status seemed to return to baseline.       Oncology/Hematology History   Malignant neoplasm of lower-inner quadrant of left breast in female, estrogen receptor positive   8/22/2018 Imaging    Bilateral Diagnostic MMG with D[facility] ([facility]):  The tissue density of both breasts is scattered fibroglandular. There are postsurgical and  changes in the breasts bilaterally from prior partial mastectomies.  No suspicious masses, calcifications, or other significant findings are seen.  There has been no significant interval change.   BI-RADS 2: Benign      10/7/2024 Initial Diagnosis    Malignant neoplasm of lower-inner quadrant of left breast in female, estrogen receptor positive     10/8/2024 Imaging    Bilateral Diagnostic MMG with elena & Bilateral Breast US ([facility]):  Due to the patient's small breast size and body habitus, MLO and spot magnification images were not able to be obtained. Heterogeneously dense,   MMG:   Given the posterior location of the reported palpable left breast lump and the above-described technical limitations related to patient body habitus, the palpable area in the left breast is not included in the field-of-view.   In the inner posterior left breast on the cc view, there is a group of calcifications. Additional vascular and coarse calcifications are   identified on the full lateral view. Evaluation is limited due to motion artifact on that view. The patient has reported history of prior bilateral lumpectomies, and at least some of these calcifications are likely benign dystrophic calcifications. There are no obvious discrete masses within the imaged areas of either breast.  US:  A few coarse calcifications are noted in the right breast.  Superficial linear hypoechogenicity with corresponding echogenic calcifications is noted in the upper inner quadrant of the left breast.  This may correspond to the region of the calcifications noted in the inner breast on the CC mammographic image. This could potentially reflect a site of prior surgery. At 8:00, 6 cm from the nipple, in the patient indicated area of interest and corresponding to an abnormality  visible on direct inspection of the breast, there is a 2 x 1.5 x 2 cm irregular hypoechoic mass with internal echogenic calcifications and direct involvement of the overlying skin.  This is highly suggestive of malignancy. In the left axilla, there is a 0.7 x 0.8 x 0.9 cm irregular hypoechoic  mass suggestive of an abnormal lymph node, which is suspicious.  IMPRESSION:  1.  Limited examination, as above.  2.  A 2 cm mass with direct involvement of the overlying skin at 8:00 in the left breast is highly suggestive of malignancy. Recommend further evaluation with ultrasound-guided core needle biopsy.  3.  Suspicious 0.9 cm left axillary lymph node. Recommend further evaluation with ultrasound-guided core needle biopsy.   4.  Superficial hypoechogenicity with echogenic calcifications in the upper inner quadrant of the left breast is incompletely assessed and could potentially reflect a site of prior surgery but could also be malignant. Evaluation is limited due to limited mammogram and lack of prior imaging available for comparison. Recommend comparison with prior imaging and management of this area pending the above biopsy results. An  additional percutaneous biopsy or short-term follow-up imaging may be warranted. Contrast-enhanced breast MRI could also be helpful.   5.  No evidence of malignancy in the right breast accounting for the above limitations.  BI-RADS Category 5: Highly suggestive of malignancy      10/8/2024 Biopsy    Left Breast & Axilla, US-Guided Biopsies ( [facility]):    1.  Breast, left 8 o'clock position 6 cm FN, core biopsy: (Bowtie clip)  A.  Invasive mammary carcinoma with micropapillary features, [place] histologic grade 2 (tubules = 3, nuclei = 2, mitoses = 1), measuring 8 mm maximally, and involving approximately 3 core fragments.    Estrogen Receptor (ER): Positive (%, Strong)  Progesterone Receptor (MI): Positive (%, Strong)  HER2: Positive (IHC 2+; FISH copy# 6.5, ratio 2.3)  Ki-67: 19%     2.  Soft tissue, left axilla, core biopsy: (Hydro clip)  A.  Invasive mammary carcinoma, no special type (ductal), [place] histologic grade 2 (tubules = 3, nuclei = 2,  mitoses = 2), measuring 5 mm maximally, involving approximately 3 core fragments, with minimal associated lymphocytes.    Estrogen Receptor (ER): Positive (%, Strong)  Progesterone Receptor (ME): Positive (%, Strong)  HER2: Positive (IHC 2+; FISH copy# 4.8, ratio 2.4)  Ki-67: 19%    Comment:  The tumor morphology in specimens 1 and 2 are not identical.    There is minimal lymphoid tissue associated with the tumor in specimen #2 and a definitive notable metastasis cannot be confirmed.    This could possibly represent a [place] metastasis with complete replacement of the lymph node with a different morphology (morphologic heterogeneity within a single primary breast tumor), however a metastasis from a second breast primary versus a second axillary primary cannot be excluded based on pathologic slides.  Clinical and radiographic correlation is recommended.         Review of systems:  Unable to obtain due to cognitive status.      Medications:    Current Outpatient Medications:     acetaminophen (TYLENOL) 325 MG tablet, Take 2 tablets by mouth Every 6 (Six) Hours As Needed for Mild Pain., Disp: , Rfl:     aspirin 81 MG EC tablet, Take 1 tablet by mouth Daily. Take 1 tab po q 12 hours X 2 weeks then resume 1 tab po daily, Disp: , Rfl:     atorvastatin (LIPITOR) 10 MG tablet, Take 1 tablet by mouth Every Night., Disp: , Rfl:     docusate sodium 100 MG capsule, Take 1 capsule by mouth 2 (Two) Times a Day., Disp: , Rfl:     insulin aspart (novoLOG FLEXPEN) 100 UNIT/ML solution pen-injector sc pen, Inject  under the skin into the appropriate area as directed 3 (Three) Times a Day With Meals., Disp: , Rfl:     insulin glargine (Semglee) 100 UNIT/ML injection, Inject  under the skin into the appropriate area as directed Daily., Disp: , Rfl:     insulin lispro (ADMELOG) 100 UNIT/ML injection, Inject 7 Units under the skin into the appropriate area as directed 3 (Three) Times a Day With Meals for 30 days., Disp: 100  mL, Rfl: 0    levothyroxine (SYNTHROID, LEVOTHROID) 50 MCG tablet, Take 1 tablet by mouth Daily., Disp: , Rfl:     multivitamin with minerals tablet tablet, Take 1 tablet by mouth Daily., Disp: , Rfl:     ondansetron (ZOFRAN) 4 MG tablet, Take 1 tablet by mouth every 6 (six) hours as needed for nausea or vomiting., Disp: 14 tablet, Rfl: 0    pantoprazole (PROTONIX) 40 MG EC tablet, Take 1 tablet by mouth Daily., Disp: , Rfl:     sertraline (ZOLOFT) 25 MG tablet, Take 1 tablet by mouth Daily., Disp: , Rfl:     TUBERCULIN PPD ID, Inject 0.1 mL into the appropriate area of the skin as directed by provider. Once a day on the 1st of every 12th month, Disp: , Rfl:     Allergies:  No Known Allergies      Past Medical History:   Diagnosis Date    Arthritis     Breast cancer     Dementia     Depression     Diabetes mellitus     Disease of thyroid gland     Hypotension     PONV (postoperative nausea and vomiting)        Past Surgical History:   Procedure Laterality Date    BREAST LUMPECTOMY Left     HIP INTERTROCHANTERIC NAILING Left 2022    Procedure: HIP INTERTROCHANTERIC NAILING;  Surgeon: Williams Church MD;  Location: [facility];  Service: Orthopedics;  Laterality: Left;    KNEE ARTHROPLASTY Left     US GUIDED LYMPH NODE BIOPSY  10/8/2024       Family History   Problem Relation Age of Onset    Arthritis Mother     Dementia Mother     Migraines Mother     Testicular cancer Father         Unknown age of diagnosis    Heart disease Father     Breast cancer Sister         Unknown age of diagnosis       Social History     Socioeconomic History    Marital status:     Number of children: 2   Tobacco Use    Smoking status: Never    Smokeless tobacco: Never   Vaping Use    Vaping status: Never Used   Substance and Sexual Activity    Alcohol use: Never    Drug use: Never    Sexual activity: Defer     Patient drinks 1 servings of caffeine per day.       GYNECOLOGIC HISTORY:   . P: 2. AB: 0.  Last menstrual  period: Unsure  Age at menarche: Unsure  Age at first childbirth: 25  Lactation/How long: N/A  Age at menopause: Unsure  Total years of oral contraceptive use: couple of years   Total years of hormone replacement therapy: Unsure      Objective   PHYSICAL EXAMINATION:   Vitals:    11/13/24 0922   BP: 130/78   Pulse: 74   Resp: 16   SpO2: 96%     ECOG 2 - Symptomatic, <50% confined to bed  General: NAD, frail appearing, in a wheelchair  Psych: a&o x 3, normal mood and affect  Eyes: EOMI, no scleral icterus  ENMT: neck supple without masses or thyromegaly, mucus membranes moist  Resp: normal effort, CTAB  CV: RRR, no murmurs, no edema  GI: soft, NT, ND  MSK: decreased ROM in bilateral shoulders, required the assistance of 4 people to move from wheelchair to the exam table  Lymph nodes: no cervical, supraclavicular or axillary lymphadenopathy  Breast: symmetric, small size, grade 2 ptosis  Right: Radial scar at 2:00 with underlying 1 cm nodule.  No nipple abnormalities.  Left: Radial scar at 9:00.  At the medial edge and just inferior to the scar, there is a 2 x 2 cm indurated erythematous mass.  No signs of infection or bleeding.  The mass appears mobile with respect to the chest wall.  No nipple abnormalities.  No obvious left axillary scar.  See photo uploaded to media tab today.    Bilateral breast, in-office ultrasound:   Right: At 2:00, 7 cm FN, there is a hypoechoic nodule. This measures 7 x 6 x 12 mm.  It appears to be abutting the underlying pectoralis fascia and also appears to have linear continuation with the overlying scar.  Left: At 9:00, 8 cm FN, there is an irregular hypoechoic mass with overlying skin involvement.  There players to be a plane between the mass and the underlying muscle.  This measures at least 22 mm in radial dimension.      Assessment & Plan   Assessment:   1. 83 y.o. F with a new diagnosis of left breast cancer: Intermediate grade, invasive mammary carcinoma, ER/SD positive, Her2  amplified; clinical [id number], anatomic stage IIIB, prognostic stage IIIA.    2.  She has a right breast mass underlying a prior surgical scar, possibly fat necrosis or scar tissue.  3.  She has a past history of left breast cancer in the 1990s, sp lumpectomy, radiation, and tamoxifen.  4.  Medical history is significant for Alzheimer's dementia.    Discussion:  I had an extensive discussion with the patient and her family about the nature of her breast cancer diagnosis. We reviewed her pathology report in detail. We reviewed breast cancer histology, including stage, grade, ER/KY receptors, HER2 receptors and how this applies to her diagnosis. We reviewed the basics of systemic and local/regional management of breast cancer.     This cancer occurred within her prior lumpectomy scar, however given the 30 years between diagnoses, I would favor a second primary.  Ideally we would assess her for metastatic disease prior to deciding on treatment options.  Her son and daughter-in-law believes that she will be able to tolerate staging studies and would like to try to obtain them.    We briefly discussed options for local management of the cancer eroding through her left breast.  Even with a limited resection under MAC/local anesthesia, there would be some risk of worsened cognitive status postoperatively.  Her family would prefer to avoid this.  We discussed the option of endocrine therapy to help stabilize the cancer and after 6 months to a year, maybe even see some shrinkage.  If the cancer continues to progress and starts to bleed or become infected, we could consider targeted radiation.  We briefly discussed the fact that this cancer is also HER2 positive.  I explained that HER2 targeted therapy is typically given alongside chemotherapy.  We all agree that she would not be a chemotherapy candidate.  I will defer further discussions about any HER2 treatment to medical oncology.    BMI is >= 30 and <35. (Class 1 Obesity).  The following options were offered after discussion;: weight loss educational material (shared in after visit summary) and information on healthy weight added to patient's after visit summary      Plan:  -Staging studies with CT and bone scan.  If the patient is unable to complete these, will consider chest x-ray and abdominal ultrasound.  -Medical oncology referral.  -Follow-up after staging studies to finalize local management plan.  -At next visit, we need to verify that they do not want the right mass biopsied and also ask if they would like to pursue genetic testing given the second primary diagnosis.    Sheila Lewis MD    I spent 100 minutes caring for Velma on this date of service. This time includes time spent by me in the following activities: preparing for the visit, performing a medically appropriate examination and/or evaluation , counseling and educating the patient/family/caregiver, ordering medications, tests, or procedures, referring and communicating with other health care professionals , documenting information in the medical record, and independently interpreting results and communicating that information with the patient/family/caregiver.      CC:  MALA Franklin,

## 2024-11-13 NOTE — LETTER
November 13, 2024     MALA Sanchez  [address] [facility] [address]    Patient: Velma Bangura   YOB: 1940   Date of Visit: 11/13/2024     Dear MALA Sanchez:       Thank you for referring Velma Bangura to me for evaluation. Below are the relevant portions of my assessment and plan of care.    If you have questions, please do not hesitate to call me. I look forward to following Velma along with you.         Sincerely,        Sheila Lewis MD        CC: DO Joshua Ventura Stephanie L, MD  11/13/24 1104  Sign when Signing Visit  [facility]     Referring Provider: MALA Franklin     Chief complaint: Newly diagnosed breast cancer    Subjective   HPI: Ms. Velma Bangura is a 84 yo woman, seen at the request of MALA Fritz, for a new diagnosis of left breast cancer.  She lives in a memory care assisted living facility.  About a month ago, the member of the facility staff noted a left breast lesion while performing her routine hygiene.  She subsequently underwent mammogram and ultrasound.  This demonstrated a left breast mass which is eroding through the skin as well as left axillary lymphadenopathy.  Biopsy of both the mass and lymph node showed triple positive invasive ductal carcinoma.    She is joined in clinic today by her son, Ayden, and daughter-in-law, Jimenez who are acting as historians.  The patient's dementia is fairly advanced and she is unable to participate in the conversation.  They report that she has a past history of left breast cancer in the late 90s.  She underwent a lumpectomy followed by radiation and took tamoxifen for period of time.  She did not require any chemotherapy.  They also report that upon completing tamoxifen she then took estrogen supplements for a while.  On exam she also appears to have also undergone a right breast surgery, however neither the patient nor her family  know when this was done or what it was for.    Past medical history is also significant for diabetes on insulin.  She underwent hip surgery in 12/2022 for hip fracture.  She already had dementia at that time and they report that after anesthesia it took weeks before her cognitive status seemed to return to baseline.       Oncology/Hematology History   Malignant neoplasm of lower-inner quadrant of left breast in female, estrogen receptor positive   8/22/2018 Imaging    Bilateral Diagnostic MMG with Elena ([facility]):  The tissue density of both breasts is scattered fibroglandular. There are postsurgical and changes in the breasts bilaterally from prior partial mastectomies.  No suspicious masses, calcifications, or other significant findings are seen.  There has been no significant interval change.   BI-RADS 2: Benign      10/7/2024 Initial Diagnosis    Malignant neoplasm of lower-inner quadrant of left breast in female, estrogen receptor positive     10/8/2024 Imaging    Bilateral Diagnostic MMG with elena & Bilateral Breast US ([facility]):  Due to the patient's small breast size and body habitus, MLO and spot magnification images were not able to be obtained. Heterogeneously dense,   MMG:   Given the posterior location of the reported palpable left breast lump and the above-described technical limitations related to patient body habitus, the palpable area in the left breast is not included in the field-of-view.   In the inner posterior left breast on the cc view, there is a group of calcifications. Additional vascular and coarse calcifications are   identified on the full lateral view. Evaluation is limited due to motion artifact on that view. The patient has reported history of prior bilateral lumpectomies, and at least some of these calcifications are likely benign dystrophic calcifications. There are no obvious discrete masses within the imaged areas of either breast.  US:  A few coarse calcifications are noted  in the right breast.  Superficial linear hypoechogenicity with corresponding echogenic calcifications is noted in the upper inner quadrant of the left breast.  This may correspond to the region of the calcifications noted in the inner breast on the CC mammographic image. This could potentially reflect a site of prior surgery. At 8:00, 6 cm from the nipple, in the patient indicated area of interest and corresponding to an abnormality  visible on direct inspection of the breast, there is a 2 x 1.5 x 2 cm irregular hypoechoic mass with internal echogenic calcifications and direct involvement of the overlying skin. This is highly suggestive of malignancy. In the left axilla, there is a 0.7 x 0.8 x 0.9 cm irregular hypoechoic  mass suggestive of an abnormal lymph node, which is suspicious.  IMPRESSION:  1.  Limited examination, as above.  2.  A 2 cm mass with direct involvement of the overlying skin at 8:00 in the left breast is highly suggestive of malignancy. Recommend further evaluation with ultrasound-guided core needle biopsy.  3.  Suspicious 0.9 cm left axillary lymph node. Recommend further evaluation with ultrasound-guided core needle biopsy.   4.  Superficial hypoechogenicity with echogenic calcifications in the upper inner quadrant of the left breast is incompletely assessed and could potentially reflect a site of prior surgery but could also be malignant. Evaluation is limited due to limited mammogram and lack of prior imaging available for comparison. Recommend comparison with prior imaging and management of this area pending the above biopsy results. An  additional percutaneous biopsy or short-term follow-up imaging may be warranted. Contrast-enhanced breast MRI could also be helpful.   5.  No evidence of malignancy in the right breast accounting for the above limitations.  BI-RADS Category 5: Highly suggestive of malignancy      10/8/2024 Biopsy    Left Breast & Axilla, [facility]opsies [facility]    1.   Breast, left 8 o'clock position 6 cm FN, core biopsy: (Bowtie clip)  A.  Invasive mammary carcinoma with micropapillary features, Breanna histologic grade 2 (tubules = 3, nuclei = 2, mitoses = 1), measuring 8 mm maximally, and involving approximately 3 core fragments.    Estrogen Receptor (ER): Positive (%, Strong)  Progesterone Receptor (MO): Positive (%, Strong)  HER2: Positive (IHC 2+; FISH copy# 6.5, ratio 2.3)  Ki-67: 19%     2.  Soft tissue, left axilla, core biopsy: (Hydro clip)  A.  Invasive mammary carcinoma, no special type (ductal), Greer histologic grade 2 (tubules = 3, nuclei = 2, mitoses = 2), measuring 5 mm maximally, involving approximately 3 core fragments, with minimal associated lymphocytes.    Estrogen Receptor (ER): Positive (%, Strong)  Progesterone Receptor (MO): Positive (%, Strong)  HER2: Positive (IHC 2+; FISH copy# 4.8, ratio 2.4)  Ki-67: 19%    Comment:  The tumor morphology in specimens 1 and 2 are not identical.    There is minimal lymphoid tissue associated with the tumor in specimen #2 and a definitive notable metastasis cannot be confirmed.    This could possibly represent a shameka metastasis with complete replacement of the lymph node with a different morphology (morphologic heterogeneity within a single primary breast tumor), however a metastasis from a second breast primary versus a second axillary primary cannot be excluded based on pathologic slides.  Clinical and radiographic correlation is recommended.         Review of systems:  Unable to obtain due to cognitive status.      Medications:    Current Outpatient Medications:   •  acetaminophen (TYLENOL) 325 MG tablet, Take 2 tablets by mouth Every 6 (Six) Hours As Needed for Mild Pain., Disp: , Rfl:   •  aspirin 81 MG EC tablet, Take 1 tablet by mouth Daily. Take 1 tab po q 12 hours X 2 weeks then resume 1 tab po daily, Disp: , Rfl:   •  atorvastatin (LIPITOR) 10 MG tablet, Take 1 tablet by mouth  Every Night., Disp: , Rfl:   •  docusate sodium 100 MG capsule, Take 1 capsule by mouth 2 (Two) Times a Day., Disp: , Rfl:   •  insulin aspart (novoLOG FLEXPEN) 100 UNIT/ML solution pen-injector sc pen, Inject  under the skin into the appropriate area as directed 3 (Three) Times a Day With Meals., Disp: , Rfl:   •  insulin glargine (Semglee) 100 UNIT/ML injection, Inject  under the skin into the appropriate area as directed Daily., Disp: , Rfl:   •  insulin lispro (ADMELOG) 100 UNIT/ML injection, Inject 7 Units under the skin into the appropriate area as directed 3 (Three) Times a Day With Meals for 30 days., Disp: 100 mL, Rfl: 0  •  levothyroxine (SYNTHROID, LEVOTHROID) 50 MCG tablet, Take 1 tablet by mouth Daily., Disp: , Rfl:   •  multivitamin with minerals tablet tablet, Take 1 tablet by mouth Daily., Disp: , Rfl:   •  ondansetron (ZOFRAN) 4 MG tablet, Take 1 tablet by mouth every 6 (six) hours as needed for nausea or vomiting., Disp: 14 tablet, Rfl: 0  •  pantoprazole (PROTONIX) 40 MG EC tablet, Take 1 tablet by mouth Daily., Disp: , Rfl:   •  sertraline (ZOLOFT) 25 MG tablet, Take 1 tablet by mouth Daily., Disp: , Rfl:   •  TUBERCULIN PPD ID, Inject 0.1 mL into the appropriate area of the skin as directed by provider. Once a day on the 1st of every 12th month, Disp: , Rfl:     Allergies:  No Known Allergies      Past Medical History:   Diagnosis Date   • Arthritis    • Breast cancer    • Dementia    • Depression    • Diabetes mellitus    • Disease of thyroid gland    • Hypotension    • PONV (postoperative nausea and vomiting)        Past Surgical History:   Procedure Laterality Date   • BREAST LUMPECTOMY Left    • HIP INTERTROCHANTERIC NAILING Left 12/18/2022    Procedure: HIP INTERTROCHANTERIC NAILING;  Surgeon: Williams Church MD;  Location: [facility];  Service: Orthopedics;  Laterality: Left;   • KNEE ARTHROPLASTY Left    • US GUIDED LYMPH NODE BIOPSY  10/8/2024       Family History   Problem  Relation Age of Onset   • Arthritis Mother    • Dementia Mother    • Migraines Mother    • Testicular cancer Father         Unknown age of diagnosis   • Heart disease Father    • Breast cancer Sister         Unknown age of diagnosis       Social History     Socioeconomic History   • Marital status:    • Number of children: 2   Tobacco Use   • Smoking status: Never   • Smokeless tobacco: Never   Vaping Use   • Vaping status: Never Used   Substance and Sexual Activity   • Alcohol use: Never   • Drug use: Never   • Sexual activity: Defer     Patient drinks 1 servings of caffeine per day.       GYNECOLOGIC HISTORY:   . P: 2. AB: 0.  Last menstrual period: Unsure  Age at menarche: Unsure  Age at first childbirth: 25  Lactation/How long: N/A  Age at menopause: Unsure  Total years of oral contraceptive use: couple of years   Total years of hormone replacement therapy: Unsure      Objective  PHYSICAL EXAMINATION:   Vitals:    24 0922   BP: 130/78   Pulse: 74   Resp: 16   SpO2: 96%     ECOG 2 - Symptomatic, <50% confined to bed  General: NAD, frail appearing, in a wheelchair  Psych: a&o x 3, normal mood and affect  Eyes: EOMI, no scleral icterus  ENMT: neck supple without masses or thyromegaly, mucus membranes moist  Resp: normal effort, CTAB  CV: RRR, no murmurs, no edema  GI: soft, NT, ND  MSK: decreased ROM in bilateral shoulders, required the assistance of 4 people to move from wheelchair to the exam table  Lymph nodes: no cervical, supraclavicular or axillary lymphadenopathy  Breast: symmetric, small size, grade 2 ptosis  Right: Radial scar at 2:00 with underlying 1 cm nodule.  No nipple abnormalities.  Left: Radial scar at 9:00.  At the medial edge and just inferior to the scar, there is a 2 x 2 cm indurated erythematous mass.  No signs of infection or bleeding.  The mass appears mobile with respect to the chest wall.  No nipple abnormalities.  No obvious left axillary scar.  See photo uploaded to  media tab today.    Bilateral breast, in-office ultrasound:   Right: At 2:00, 7 cm FN, there is a hypoechoic nodule. This measures 7 x 6 x 12 mm.  It appears to be abutting the underlying pectoralis fascia and also appears to have linear continuation with the overlying scar.  Left: At 9:00, 8 cm FN, there is an irregular hypoechoic mass with overlying skin involvement.  There players to be a plane between the mass and the underlying muscle.  This measures at least 22 mm in radial dimension.      Assessment & Plan  Assessment:   1. 83 y.o. F with a new diagnosis of left breast cancer: Intermediate grade, invasive mammary carcinoma, ER/MS positive, Her2 amplified; clinical [id number], anatomic stage IIIB, prognostic stage IIIA.    2.  She has a right breast mass underlying a prior surgical scar, possibly fat necrosis or scar tissue.  3.  She has a past history of left breast cancer in the 1990s, sp lumpectomy, radiation, and tamoxifen.  4.  Medical history is significant for Alzheimer's dementia.    Discussion:  I had an extensive discussion with the patient and her family about the nature of her breast cancer diagnosis. We reviewed her pathology report in detail. We reviewed breast cancer histology, including stage, grade, ER/MS receptors, HER2 receptors and how this applies to her diagnosis. We reviewed the basics of systemic and local/regional management of breast cancer.     This cancer occurred within her prior lumpectomy scar, however given the 30 years between diagnoses, I would favor a second primary.  Ideally we would assess her for metastatic disease prior to deciding on treatment options.  Her son and daughter-in-law believes that she will be able to tolerate staging studies and would like to try to obtain them.    We briefly discussed options for local management of the cancer eroding through her left breast.  Even with a limited resection under MAC/local anesthesia, there would be some risk of worsened  cognitive status postoperatively.  Her family would prefer to avoid this.  We discussed the option of endocrine therapy to help stabilize the cancer and after 6 months to a year, maybe even see some shrinkage.  If the cancer continues to progress and starts to bleed or become infected, we could consider targeted radiation.  We briefly discussed the fact that this cancer is also HER2 positive.  I explained that HER2 targeted therapy is typically given alongside chemotherapy.  We all agree that she would not be a chemotherapy candidate.  I will defer further discussions about any HER2 treatment to medical oncology.    BMI is >= 30 and <35. (Class 1 Obesity). The following options were offered after discussion;: weight loss educational material (shared in after visit summary) and information on healthy weight added to patient's after visit summary      Plan:  -Staging studies with CT and bone scan.  If the patient is unable to complete these, will consider chest x-ray and abdominal ultrasound.  -Medical oncology referral.  -Follow-up after staging studies to finalize local management plan.  -At next visit, we need to verify that they do not want the right mass biopsied and also ask if they would like to pursue genetic testing given the second primary diagnosis.    Sheila Lewis MD    I spent 100 minutes caring for Velma on this date of service. This time includes time spent by me in the following activities: preparing for the visit, performing a medically appropriate examination and/or evaluation , counseling and educating the patient/family/caregiver, ordering medications, tests, or procedures, referring and communicating with other health care professionals , documenting information in the medical record, and independently interpreting results and communicating that information with the patient/family/caregiver.      CC:  MALA Franklin DO

## 2024-11-15 ENCOUNTER — PATIENT OUTREACH (OUTPATIENT)
Dept: ONCOLOGY | Facility: HOSPITAL | Age: 84
End: 2024-11-15
Payer: MEDICARE

## 2024-11-15 ENCOUNTER — TELEPHONE (OUTPATIENT)
Dept: SURGERY | Facility: CLINIC | Age: 84
End: 2024-11-15
Payer: MEDICARE

## 2024-11-15 NOTE — TELEPHONE ENCOUNTER
I spoke with her son today.  They have decided that they do not want to pursue any treatment with the intention of prolonging her life.  They also do not want to proceed with staging studies at this time, as they are not sure whether or not the patient would be able to tolerate them.  I do think it would be useful to try some endocrine therapy to help stabilize the breast lesion so it does not progress and become a problematic wound.  They are agreeable to trying endocrine therapy.  We will cancel the scheduled staging studies and canceled her follow-up with me.  They will keep their upcoming appointment with Dr. Marks.    Sheila Lewis MD

## 2024-12-13 ENCOUNTER — CONSULT (OUTPATIENT)
Dept: ONCOLOGY | Facility: CLINIC | Age: 84
End: 2024-12-13
Payer: MEDICARE

## 2024-12-13 ENCOUNTER — LAB (OUTPATIENT)
Dept: LAB | Facility: HOSPITAL | Age: 84
End: 2024-12-13
Payer: MEDICARE

## 2024-12-13 VITALS
WEIGHT: 210 LBS | HEART RATE: 75 BPM | HEIGHT: 65 IN | SYSTOLIC BLOOD PRESSURE: 138 MMHG | TEMPERATURE: 98.2 F | DIASTOLIC BLOOD PRESSURE: 79 MMHG | OXYGEN SATURATION: 95 % | BODY MASS INDEX: 34.99 KG/M2

## 2024-12-13 DIAGNOSIS — Z17.0 MALIGNANT NEOPLASM OF LEFT BREAST IN FEMALE, ESTROGEN RECEPTOR POSITIVE, UNSPECIFIED SITE OF BREAST: Primary | ICD-10-CM

## 2024-12-13 DIAGNOSIS — C50.912 MALIGNANT NEOPLASM OF LEFT BREAST IN FEMALE, ESTROGEN RECEPTOR POSITIVE, UNSPECIFIED SITE OF BREAST: Primary | ICD-10-CM

## 2024-12-13 DIAGNOSIS — C50.919 MALIGNANT NEOPLASM OF FEMALE BREAST, UNSPECIFIED ESTROGEN RECEPTOR STATUS, UNSPECIFIED LATERALITY, UNSPECIFIED SITE OF BREAST: Primary | ICD-10-CM

## 2024-12-13 LAB
BASOPHILS # BLD AUTO: 0.06 10*3/MM3 (ref 0–0.2)
BASOPHILS NFR BLD AUTO: 0.7 % (ref 0–1.5)
DEPRECATED RDW RBC AUTO: 42.2 FL (ref 37–54)
EOSINOPHIL # BLD AUTO: 0.28 10*3/MM3 (ref 0–0.4)
EOSINOPHIL NFR BLD AUTO: 3.3 % (ref 0.3–6.2)
ERYTHROCYTE [DISTWIDTH] IN BLOOD BY AUTOMATED COUNT: 12.5 % (ref 12.3–15.4)
HCT VFR BLD AUTO: 50.1 % (ref 34–46.6)
HGB BLD-MCNC: 17.1 G/DL (ref 12–15.9)
IMM GRANULOCYTES # BLD AUTO: 0.06 10*3/MM3 (ref 0–0.05)
IMM GRANULOCYTES NFR BLD AUTO: 0.7 % (ref 0–0.5)
LYMPHOCYTES # BLD AUTO: 2.2 10*3/MM3 (ref 0.7–3.1)
LYMPHOCYTES NFR BLD AUTO: 26 % (ref 19.6–45.3)
MCH RBC QN AUTO: 31.2 PG (ref 26.6–33)
MCHC RBC AUTO-ENTMCNC: 34.1 G/DL (ref 31.5–35.7)
MCV RBC AUTO: 91.4 FL (ref 79–97)
MONOCYTES # BLD AUTO: 0.69 10*3/MM3 (ref 0.1–0.9)
MONOCYTES NFR BLD AUTO: 8.1 % (ref 5–12)
NEUTROPHILS NFR BLD AUTO: 5.18 10*3/MM3 (ref 1.7–7)
NEUTROPHILS NFR BLD AUTO: 61.2 % (ref 42.7–76)
NRBC BLD AUTO-RTO: 0 /100 WBC (ref 0–0.2)
PLATELET # BLD AUTO: 167 10*3/MM3 (ref 140–450)
PMV BLD AUTO: 11.7 FL (ref 6–12)
RBC # BLD AUTO: 5.48 10*6/MM3 (ref 3.77–5.28)
WBC NRBC COR # BLD AUTO: 8.47 10*3/MM3 (ref 3.4–10.8)

## 2024-12-13 PROCEDURE — 85025 COMPLETE CBC W/AUTO DIFF WBC: CPT

## 2024-12-13 PROCEDURE — 36415 COLL VENOUS BLD VENIPUNCTURE: CPT

## 2024-12-13 NOTE — PROGRESS NOTES
Subjective   Velma Bangura is a 84 y.o. female.  Referred by Dr. Lewis for left breast invasive ductal carcinoma    History of Present Illness     Ms. Bangura is an 84-year-old lady who lives in a memory care assisted living facility.  Staff at the facility noticed a left breast mass in October 2024 and subsequent workup was performed.  Patient is noted to have a prior history of left breast cancer in the 1990s treated with lumpectomy, radiation and endocrine therapy likely tamoxifen.    10/8/2024-bilateral diagnostic mammogram and bilateral breast ultrasound  Heterogeneously dense breast  The palpable left breast mass was not included in the field-of-view due to the posterior location of the palpated mass and technical limitations due to the patient's body habitus, patient was noted to be small breasted and has additional views were unable to be performed.  Calcifications noted in the posterior left breast.  Ultrasound  Few coarse calcifications are noted in the right breast  Superficial linear hypoechogenicity with corresponding echogenic calcifications in the upper inner quadrant of the left breast.  This corresponds to the calcifications noted on the mammogram.  This may potentially reflect the site of prior surgery.  At 8:00, 6 cm from the nipple there is a 2 x 1.5 x 2 cm irregular hypoechoic mass with internal echogenic calcifications and direct involvement of the overlying skin.  Highly suggestive of malignancy.  In the left axilla there is a 0.7 x 0.8 x 3.9 cm irregular hypoechoic mass suggestive of abnormal lymph node.  This is suspicious.    Impression  1.Limited exam  2.2 centimeter mass with direct involvement of the overlying skin at 8:00 in the left breast is highly suspicious.  Ultrasound-guided biopsy recommended  3.suspicious 0.9 cm left axillary lymph node.  Further evaluation with ultrasound-guided biopsy recommended.  4.superficial hypoechogenicity with echogenic calcifications in the upper inner  quadrant of the left breast is incompletely assessed and potentially reflect a site of prior biopsy but could also be malignant.  Evaluation is limited due to limited mammogram and lack of prior imaging for comparison.  Recommend comparison with prior imaging.  Contrast-enhanced breast MRI could be helpful.  5.no evidence of malignancy in the right breast accounting for the above limitations.    10/8/2024-left breast and axillary biopsies  1.left breast, 8:00, 6 cm from the nipple core biopsy  A.invasive mammary carcinoma with micropapillary features.  Grade 2  Tumor measures 8 mm  ER +91 to 100% strong  LA +91 to 100% strong  HER2 2+ on immunohistochemistry, amplified on FISH with a ratio of 2.3, HER2 nu copy #6.5, CEP 17 copy number 2.8, ratio 2.3    2.left axilla soft tissue, core biopsy  Invasive mammary carcinoma, grade 2  ER +91 to 100% strong  LA +91 to 100% strong  HER2 2+ on immunohistochemistry, amplified on FISH with a HER2 copy number of 4.8, CEP 17 copy #2.0, ratio 2.4  Ki-67 19%    Comment  The tumor morphology in specimens 1 and 2 are not identical.    There is minimal lymphoid tissue associated with the tumor in specimen #2 and a definitive notable metastasis cannot be confirmed.    This could possibly represent a [place] metastasis with complete replacement of the lymph node with a different morphology (morphologic heterogeneity within a single primary breast tumor), however a metastasis from a second breast primary versus a second axillary primary cannot be excluded based on pathologic slides.  Clinical and radiographic correlation is recommended.    Comorbidities include diabetes,Hypothyroidism. Significant dementia.      The following portions of the patient's history were reviewed and updated as appropriate: allergies, current medications, past family history, past medical history, past social history, past surgical history, and problem list.    Past Medical History:   Diagnosis Date    Abnormal  result of Mantoux test 1970    TEST POSITIVE. 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     History of UTI     Hypercholesteremia     Hypotension     Osteopenia     PONV (postoperative nausea and vomiting)     Retinal vein occlusion     Left eye        Past Surgical History:   Procedure Laterality Date    BREAST LUMPECTOMY Left     CATARACT EXTRACTION      COLONOSCOPY  2016    HIP INTERTROCHANTERIC NAILING Left 2022    Procedure: HIP INTERTROCHANTERIC NAILING;  Surgeon: Williams Church MD;  Location: [facility] OR;  Service: Orthopedics;  Laterality: Left;    KNEE ARTHROPLASTY Left     TONSILLECTOMY      TUBAL ABDOMINAL LIGATION      US GUIDED LYMPH NODE BIOPSY  10/08/2024    WISDOM TOOTH EXTRACTION Bilateral         Family History   Problem Relation Age of Onset    Arthritis Mother     Dementia Mother     Migraines Mother     Testicular cancer Father         Unknown age of diagnosis    Heart disease Father     Breast cancer Sister         Unknown age of diagnosis    No Known Problems Son     No Known Problems Son         Social History     Socioeconomic History    Marital status:     Number of children: 2   Tobacco Use    Smoking status: Never    Smokeless tobacco: Never   Vaping Use    Vaping status: Never Used   Substance and Sexual Activity    Alcohol use: Never    Drug use: Never    Sexual activity: Defer        OB History    No obstetric history on file.      Age at menarche and age at menopause unclear   2 para 2  0  Oral contraceptive pill use for a few years  Unsure if use of hormone replacement therapy    No Known Allergies         Review of Systems  Unable to obtain due to cognitive status.    Objective   There were no vitals taken for this visit.   Physical Exam  Vitals reviewed.    Constitutional:       Appearance: Normal appearance.   HENT:      Nose: Nose normal.      Mouth/Throat:      Pharynx: Oropharynx is clear.   Eyes:      Conjunctiva/sclera: Conjunctivae normal.   Cardiovascular:      Rate and Rhythm: Normal rate.   Pulmonary:      Effort: Pulmonary effort is normal.   Skin:     General: Skin is warm.   Neurological:      General: No focal deficit present.      Mental Status: She is alert.   Psychiatric:         Mood and Affect: Mood normal.       Breast Exam:Right breast-there is a scar at 2 o'clock position.  Possible underlying scar tissue.  Left breast-scar at 9 o'clock position.  Around 8 o'clock position at the medial aspect of the breast there is a 2 x 2 cm circular indurated erythematous nodule with seems to be fixed to the underlying chest wall.  No obvious axillary lymphadenopathy palpated.    No visits with results within 30 Day(s) from this visit.   Latest known visit with results is:   Hospital Outpatient Visit on 10/08/2024   Component Date Value Ref Range Status    Addendum 10/08/2024    Final                    Value:Please see the completely scanned HER2 FISH (blocks 1A and 2A) report from [facility] below.       Case Report 10/08/2024    Final                    Value:Surgical Pathology Report                         Case: [id number]                                  Authorizing Provider:  Leroy Karimi MD        Collected:           10/08/2024 03:16 PM          Ordering Location:     [facility]  Received:            10/08/2024 03:46 PM                                 [place]                                                                           Pathologist:           Matthew Sierra MD                                                          Specimens:   1) - Breast, Left, left breast 800 6 cm FN, 10 g needle, bow clip, 3 passes, formalin               at 1523, NOT for calcs                                                                               2) - Axilla, Left, left axilla, formalin at 1509, 14 g needle, hydro clip, 3 passes,                NOT for calcs                                                                              Clinical Information 10/08/2024    Corrected                    Value:Left breast mass and enlarged lt axilla lymph node  Not for calcifications       Final Diagnosis 10/08/2024    Corrected                    Value:1.  Breast, left 8 o'clock position 6 cm FN, core biopsy: (Bowtie clip)  A.  Invasive mammary carcinoma with micropapillary features, [place] histologic grade 2 (tubules = 3, nuclei = 2, mitoses = 1), measuring 8 mm maximally, and involving approximately 3 core fragments.    2.  Soft tissue, left axilla, core biopsy: (Hydro clip)  A.  Invasive mammary carcinoma, no special type (ductal), [place] histologic grade 2 (tubules = 3, nuclei = 2, mitoses = 2), measuring 5 mm maximally, involving approximately 3 core fragments, with minimal associated lymphocytes.      Synoptic Checklist 10/08/2024    Final                    Value:Breast Biomarker Reporting Template                            BREAST BIOMARKER REPORTING TEMPLATE - 1                            Protocol posted: 12/13/2023                                                           Test(s) Performed:                                     Estrogen Receptor (ER) Status:    Positive (greater than 10% of cells demonstrate nuclear positivity)                                    Percentage of Cells with Nuclear Positivity:    %                                    Average Intensity of Staining:    Strong                                  Test Type:    Food and Drug Administration (FDA) cleared (test / vendor): [employer]                                  Primary Antibody:    SP1                                Test(s) Performed:                                     Progesterone Receptor (PgR) Status:    Positive                                    Percentage of Cells  with Nuclear Positivity:    %                                    Average Intensity of Staining:    Strong                                  Test Type:    Food and Drug Administration (FDA) cleared (test / vendor): [employer]                                  Primary Antibody:    1E2                                Test(s) Performed:                                     HER2 by Immunohistochemistry:    Equivocal (Score 2+)                                  Test Type:    Food and Drug Administration (FDA) cleared (test / vendor): [employer]                                  Primary Antibody:    4B5                                Test(s) Performed:    Ki-67                                  Ki-67 Percentage of Positive Nuclei:    19 %                                 Primary Antibody:    30-9                                Cold Ischemia and Fixation Times:    Meet requirements specified in latest version of the ASCO / CAP Guidelines                                Testing Performed on Block Number(s):    1A                             Breast Biomarker Reporting Template                            BREAST BIOMARKER REPORTING TEMPLATE - 2                            Protocol posted: 12/13/2023                                                           Test(s) Performed:                                     Estrogen Receptor (ER) Status:    Positive (greater than 10% of cells demonstrate nuclear positivity)                                    Percentage of Cells with Nuclear Positivity:    %                                    Average Intensity of Staining:    Strong                                  Test Type:    Food and Drug Administration (FDA) cleared (test / vendor): [employer]                                  Primary Antibody:    SP1                                Test(s) Performed:                                     Progesterone Receptor (PgR) Status:    Positive                                    Percentage of Cells with  Nuclear Positivity:    %                                    Average Intensity of Staining:    Strong                                  Test Type:    Food and Drug Administration (FDA) cleared (test / vendor): [employer]                                  Primary Antibody:    1E2                                Test(s) Performed:                                     HER2 by Immunohistochemistry:    Equivocal (Score 2+)                                  Test Type:    Food and Drug Administration (FDA) cleared (test / vendor): [employer]                                  Primary Antibody:    4B5                                Test(s) Performed:    Ki-67                                  Ki-67 Percentage of Positive Nuclei:    19 %                                 Primary Antibody:    30-9                                Cold Ischemia and Fixation Times:    Meet requirements specified in latest version of the ASCO / CAP Guidelines                                Testing Performed on Block Number(s):    2A                                                            Comment(s):    HER2 FISH is pending.       Comment 10/08/2024    Corrected                    Value:The tumor morphology in specimens 1 and 2 are not identical.      There is minimal lymphoid tissue associated with the tumor in specimen #2 and a definitive notable metastasis cannot be confirmed.      This could possibly represent a [place] metastasis with complete replacement of the lymph node with a different morphology (morphologic heterogeneity within a single primary breast tumor), however a metastasis from a second breast primary versus a second axillary primary cannot be excluded based on pathologic slides.  Clinical and radiographic correlation is recommended.      Intradepartmental Consult 10/08/2024    Corrected                    Value:This case is shared in internal consultation with Dr. Wallace who concurs.        Gross Description 10/08/2024    Corrected          "           Value:1. Breast, Left.   Received in formalin, labeled with the patient's name, and designated \" left breast biopsy at 8:00, 6 cm from nipple, not for calcifications\", are 3 rubbery, yellow to gray-white core fragments of fibrofatty soft tissue measuring from 0.7 cm up to 1.7 x 0.3 cm in maximal tubal diameter.  The tissue is inked in black and submitted in 1A.    Cold ischemic time: 7 minutes  Total fixation time: Approximately 13.5 hours  Clip shape: Sherrill clip  Number of cores: 3  Needle gauge: 10    mb/uso/mec      2. Axilla, Left.   Received in formalin, labeled with the patient's name, and designated \" left axilla biopsy, not for calcifications\", are 3 rubbery, yellow to gray-white core fragments of fibrofatty soft tissue measuring from 0.7 cm up to 1.3 x 0.2 cm in maximal tubal diameter.  The tissue is inked in red and submitted in 2A.    Cold ischemic time: 0 minutes  Total fixation time: Approximately 14 hours  Clip shape: Hydro clip  Number of cores: 3  Needle gauge:                           14    mb/uso/mec        Special Stains 10/08/2024    Corrected                    Value:Utilizing appropriate positive and negative controls immunohistochemical stains EMA, E-cadherin, p120, p63, SMMHC, GATA3 and CK7 are performed on blocks 1A and 2A.    In block 1A E-cadherin and p120 show positivity that focally displays a cuplike pattern consistent with a micropapillary component.  EMA is strongly positive.  P63 and SMMHC highlight myoepithelial cells which are absent in the area of invasive tumor.  The tumor is positive for CK7 and GATA3 consistent with a breast primary carcinoma.    In block 2A the tumor is strongly positive for EMA.  E-cadherin and p120 show intact membranous phenotype consistent with a ductal phenotype.  P63 and SMMHC are negative consistent with a lack of myoepithelial cells.  The tumor is positive for CK7 and GATA3 consistent with a breast primary carcinoma.    ER / SD Scoring Guide: " " Nuclear staining of tumor cells equal to or greater than 1%, of any intensity, is considered a positive result with less than 1% considered negative, when controls                           are adequate.  ER/ID Disclaimer: All breast markers (Confirm anti-estrogen receptor clone SP1 and Confirm anti-progesterone receptor clone 1E2) are performed utilizing the ultra-View DAB kit on the Benchmark Ultra platform, all manufactured by [employer], [place], [place].  Reagents utilized are FDA approved for in vitro diagnostic use.  These tests are not intended as a confirmation of the original diagnosis.  They should only be used in conjunction with other established risk factors, clinical and diagnostic procedures.     Patients with breast cancers that are HER2 IHC 3+ or IHC 2+/THANIA amplified may be eligible for several therapies that disrupt HER2 signaling pathways. Invasive breast cancers that test \"HER2-negative\" (IHC 0, 1+ or 2+/THANIA not-amplified) are more specifically considered \"HER2-negative for protein overexpression/gene amplification\" since non-overexpressed levels of the HER2 protein may be present in these cases. Patients with breast cancers that are HER2 IHC                           1+ or IHC 2+/THANIA not-amplified may be eligible for a treatment that targets non-amplified/non-overexpressed levels of HER2 expression for cytotoxic drug delivery (IHC 0 results do not result in eligibility currently).      Microscopic Description 10/08/2024    Corrected                    Value:Unless \"gross only\" is specified, the final diagnosis for each specimen is based on microscopic examination of tissue.      Addendum 10/08/2024    Final                    Value:Please see the completely scanned HER2 FISH (blocks 1A and 2A) report from [facility] below.       Case Report 10/08/2024    Final                    Value:Surgical Pathology Report                         Case: [id number]                                "   Authorizing Provider:  Leroy Karimi MD        Collected:           10/08/2024 03:16 PM          Ordering Location:     [facility]  Received:            10/08/2024 03:46 PM                                 [place]                                                                           Pathologist:           Matthew Sierra MD                                                          Specimens:   1) - Breast, Left, left breast 800 6 cm FN, 10 g needle, bow clip, 3 passes, formalin               at 1523, NOT for calcs                                                                              2) - Axilla, Left, left axilla, formalin at 1509, 14 g needle, hydro clip, 3 passes,                NOT for calcs                                                                              Clinical Information 10/08/2024    Corrected                    Value:Left breast mass and enlarged lt axilla lymph node  Not for calcifications       Final Diagnosis 10/08/2024    Corrected                    Value:1.  Breast, left 8 o'clock position 6 cm FN, core biopsy: (Bowtie clip)  A.  Invasive mammary carcinoma with micropapillary features, [place] histologic grade 2 (tubules = 3, nuclei = 2, mitoses = 1), measuring 8 mm maximally, and involving approximately 3 core fragments.    2.  Soft tissue, left axilla, core biopsy: (Hydro clip)  A.  Invasive mammary carcinoma, no special type (ductal), [place] histologic grade 2 (tubules = 3, nuclei = 2, mitoses = 2), measuring 5 mm maximally, involving approximately 3 core fragments, with minimal associated lymphocytes.      Synoptic Checklist 10/08/2024    Final                    Value:Breast Biomarker Reporting Template                            BREAST BIOMARKER REPORTING TEMPLATE - 1                            Protocol posted: 12/13/2023                                                           Test(s) Performed:                                     Estrogen Receptor  (ER) Status:    Positive (greater than 10% of cells demonstrate nuclear positivity)                                    Percentage of Cells with Nuclear Positivity:    %                                    Average Intensity of Staining:    Strong                                  Test Type:    Food and Drug Administration (FDA) cleared (test / vendor): [employer]                                  Primary Antibody:    SP1                                Test(s) Performed:                                     Progesterone Receptor (PgR) Status:    Positive                                    Percentage of Cells with Nuclear Positivity:    %                                    Average Intensity of Staining:    Strong                                  Test Type:    Food and Drug Administration (FDA) cleared (test / vendor): [employer]                                  Primary Antibody:    1E2                                Test(s) Performed:                                     HER2 by Immunohistochemistry:    Equivocal (Score 2+)                                  Test Type:    Food and Drug Administration (FDA) cleared (test / vendor): [employer]                                  Primary Antibody:    4B5                                Test(s) Performed:    Ki-67                                  Ki-67 Percentage of Positive Nuclei:    19 %                                 Primary Antibody:    30-9                                Cold Ischemia and Fixation Times:    Meet requirements specified in latest version of the ASCO / CAP Guidelines                                Testing Performed on Block Number(s):    1A                             Breast Biomarker Reporting Template                            BREAST BIOMARKER REPORTING TEMPLATE - 2                            Protocol posted: 12/13/2023                                                           Test(s) Performed:                                     Estrogen Receptor (ER)  Status:    Positive (greater than 10% of cells demonstrate nuclear positivity)                                    Percentage of Cells with Nuclear Positivity:    %                                    Average Intensity of Staining:    Strong                                  Test Type:    Food and Drug Administration (FDA) cleared (test / vendor): [place]                                  Primary Antibody:    SP1                                Test(s) Performed:                                     Progesterone Receptor (PgR) Status:    Positive                                    Percentage of Cells with Nuclear Positivity:    %                                    Average Intensity of Staining:    Strong                                  Test Type:    Food and Drug Administration (FDA) cleared (test / vendor): [place]                                  Primary Antibody:    1E2                                Test(s) Performed:                                     HER2 by Immunohistochemistry:    Equivocal (Score 2+)                                  Test Type:    Food and Drug Administration (FDA) cleared (test / vendor): [employer]                                  Primary Antibody:    4B5                                Test(s) Performed:    Ki-67                                  Ki-67 Percentage of Positive Nuclei:    19 %                                 Primary Antibody:    30-9                                Cold Ischemia and Fixation Times:    Meet requirements specified in latest version of the ASCO / CAP Guidelines                                Testing Performed on Block Number(s):    2A                                                            Comment(s):    HER2 FISH is pending.       Comment 10/08/2024    Corrected                    Value:The tumor morphology in specimens 1 and 2 are not identical.      There is minimal lymphoid tissue associated with the tumor in specimen #2 and a definitive notable  "metastasis cannot be confirmed.      This could possibly represent a [place] metastasis with complete replacement of the lymph node with a different morphology (morphologic heterogeneity within a single primary breast tumor), however a metastasis from a second breast primary versus a second axillary primary cannot be excluded based on pathologic slides.  Clinical and radiographic correlation is recommended.      Intradepartmental Consult 10/08/2024    Corrected                    Value:This case is shared in internal consultation with Dr. Wallcae who concurs.        Gross Description 10/08/2024    Corrected                    Value:1. Breast, Left.   Received in formalin, labeled with the patient's name, and designated \" left breast biopsy at 8:00, 6 cm from nipple, not for calcifications\", are 3 rubbery, yellow to gray-white core fragments of fibrofatty soft tissue measuring from 0.7 cm up to 1.7 x 0.3 cm in maximal tubal diameter.  The tissue is inked in black and submitted in 1A.    Cold ischemic time: 7 minutes  Total fixation time: Approximately 13.5 hours  Clip shape: [place] clip  Number of cores: 3  Needle gauge: 10    mb/uso/mec      2. Axilla, Left.   Received in formalin, labeled with the patient's name, and designated \" left axilla biopsy, not for calcifications\", are 3 rubbery, yellow to gray-white core fragments of fibrofatty soft tissue measuring from 0.7 cm up to 1.3 x 0.2 cm in maximal tubal diameter.  The tissue is inked in red and submitted in 2A.    Cold ischemic time: 0 minutes  Total fixation time: Approximately 14 hours  Clip shape: Hydro clip  Number of cores: 3  Needle gauge:                           14    mb/uso/mec        Special Stains 10/08/2024    Corrected                    Value:Utilizing appropriate positive and negative controls immunohistochemical stains EMA, E-cadherin, p120, p63, SMMHC, GATA3 and CK7 are performed on blocks 1A and 2A.    In block 1A E-cadherin and p120 show positivity " "that focally displays a cuplike pattern consistent with a micropapillary component.  EMA is strongly positive.  P63 and SMMHC highlight myoepithelial cells which are absent in the area of invasive tumor.  The tumor is positive for CK7 and GATA3 consistent with a breast primary carcinoma.    In block 2A the tumor is strongly positive for EMA.  E-cadherin and p120 show intact membranous phenotype consistent with a ductal phenotype.  P63 and SMMHC are negative consistent with a lack of myoepithelial cells.  The tumor is positive for CK7 and GATA3 consistent with a breast primary carcinoma.    ER / ND Scoring Guide:  Nuclear staining of tumor cells equal to or greater than 1%, of any intensity, is considered a positive result with less than 1% considered negative, when controls                           are adequate.  ER/ND Disclaimer: All breast markers (Confirm anti-estrogen receptor clone SP1 and Confirm anti-progesterone receptor clone 1E2) are performed utilizing the ultra-View DAB kit on the Benchmark Ultra platform, all manufactured by [employer], [place], [place].  Reagents utilized are FDA approved for in vitro diagnostic use.  These tests are not intended as a confirmation of the original diagnosis.  They should only be used in conjunction with other established risk factors, clinical and diagnostic procedures.     Patients with breast cancers that are HER2 IHC 3+ or IHC 2+/THANIA amplified may be eligible for several therapies that disrupt HER2 signaling pathways. Invasive breast cancers that test \"HER2-negative\" (IHC 0, 1+ or 2+/THANIA not-amplified) are more specifically considered \"HER2-negative for protein overexpression/gene amplification\" since non-overexpressed levels of the HER2 protein may be present in these cases. Patients with breast cancers that are HER2 IHC                           1+ or IHC 2+/THANIA not-amplified may be eligible for a treatment that targets non-amplified/non-overexpressed " "levels of HER2 expression for cytotoxic drug delivery (IHC 0 results do not result in eligibility currently).      Microscopic Description 10/08/2024    Corrected                    Value:Unless \"gross only\" is specified, the final diagnosis for each specimen is based on microscopic examination of tissue.          No radiology results for the last 30 days.       Assessment & Plan       *Left breast invasive ductal carcinoma  T4b N1 MX, ER/LA strongly positive, HER2 2+ on immunohistochemistry but amplified on FISH, Ki-67 19%, grade 2  Anatomic stage IIIb and prognostic stage IIIa  Patient has significant dementia and hence unable to tolerate standard of care treatments.  She has seen Dr. Lewis and the concern is that her dementia could get significantly worse if she is put through anesthesia.  She had a hip surgery a few years ago had a very difficult time after anesthesia.  It is unclear to us as to how long this tumor has been present.  Given the low Ki-67 and low grade of the tumor although it is HER2 positive it may be reasonable to try endocrine therapy alone.  I discussed with patient's son and daughter-in-law about the standard of care treatment which would entail chemotherapy along with HER2 directed therapy followed by surgery, radiation and endocrine therapy along with HER2 directed therapy.  However this is assuming that the disease is localized.  As a palliative measure we could consider endocrine therapy along with HER2 directed therapy however after we discussed the adverse effects of HER2 directed therapy patient's family wanted to hold off on that and wanted to try endocrine therapy only.  They understand that the purpose of the treatment is to prevent further progression of disease and maybe possible shrinkage.  Patient was unable to undergo staging scans given her mental status.  We discussed starting anastrozole 1 mg p.o. daily.  Adverse effects including but not limited to hot flashes, mood " changes, fatigue, insomnia, decrease in bone density, cardiovascular risk, arthralgias and myalgias discussed.  Patient's family reports that over the past 2 weeks patient has been more tired and has been sleeping significantly more.    *Advanced dementia  Patient is currently a resident of [facility] in [place].  She is completely dependent on others for her care.  She does not recall seeing Dr. Lewis for does not recall being diagnosed with breast cancer.    *Diabetes  Continue insulin    *Follow-up-3 months.  Patient's family knows to call the clinic with any new questions or concerns.      65 minutes total spent on the encounter on the same day

## 2024-12-16 ENCOUNTER — PATIENT OUTREACH (OUTPATIENT)
Dept: ONCOLOGY | Facility: HOSPITAL | Age: 84
End: 2024-12-16
Payer: MEDICARE

## 2024-12-16 NOTE — PROGRESS NOTES
Subjective       PATIENT NAME:  Velma Bangura  YOB: 1940  PATIENT'S SEX:  female    PATIENT'S ADDRESS:  [address] [address]  PROVIDER:      Encounter Diagnosis   Name Primary?    Malignant neoplasm of left breast in female, estrogen receptor positive, unspecified site of breast Yes     No Known Allergies      NEW MEDICATION ORDERS    DATE      12/16/2024    Begin Anastrozole 1mg daily by mouth.    Begin vitamin D, 1000 u daily by mouth.     Electronically Signed by: Paty Gonzalez MD (NPI:[id number])

## 2024-12-19 ENCOUNTER — TELEPHONE (OUTPATIENT)
Dept: ONCOLOGY | Facility: CLINIC | Age: 84
End: 2024-12-19
Payer: MEDICARE

## 2024-12-19 NOTE — TELEPHONE ENCOUNTER
Returned call to Jessica to let her know I faxed over the office note and orders for anastrozole and Vitamin D. She confirms she received them today.

## 2024-12-19 NOTE — TELEPHONE ENCOUNTER
Caller: ROSE    Relationship: NURSE AT PT'S FACILITY    Best call back number: [phone]     What is the best time to reach you: 6 AM - 2:30 TO REACH ROSE AFTER THAT ASK FOR NURSE  MAST    Who are you requesting to speak with (clinical staff, provider,  specific staff member): CLINICAL     What was the call regarding: PT WAS SEEN FRIDAY. FACILITY WAS TOLD THE PT WOULD BE STARTING ON 2 NEW MEDICATIONS. THEY HAVE NOT RECEIVED THE ORDERS OR NOTES FROM THIS VISIT.     PLEASE SEND TO [phone] ATTN: ROSE

## 2025-03-19 ENCOUNTER — OFFICE VISIT (OUTPATIENT)
Dept: ONCOLOGY | Facility: CLINIC | Age: 85
End: 2025-03-19
Payer: MEDICARE

## 2025-03-19 VITALS
BODY MASS INDEX: 34.95 KG/M2 | HEIGHT: 65 IN | RESPIRATION RATE: 16 BRPM | DIASTOLIC BLOOD PRESSURE: 80 MMHG | OXYGEN SATURATION: 96 % | TEMPERATURE: 98.2 F | SYSTOLIC BLOOD PRESSURE: 122 MMHG | HEART RATE: 76 BPM

## 2025-03-19 DIAGNOSIS — C50.912 MALIGNANT NEOPLASM OF LEFT BREAST IN FEMALE, ESTROGEN RECEPTOR POSITIVE, UNSPECIFIED SITE OF BREAST: Primary | ICD-10-CM

## 2025-03-19 DIAGNOSIS — Z17.0 MALIGNANT NEOPLASM OF LEFT BREAST IN FEMALE, ESTROGEN RECEPTOR POSITIVE, UNSPECIFIED SITE OF BREAST: Primary | ICD-10-CM

## 2025-03-19 NOTE — PROGRESS NOTES
Subjective   Velma Bangura is a 84 y.o. female.  Referred by Dr. Lewis for left breast invasive ductal carcinoma    History of Present Illness     Ms. Bangura is an 84-year-old lady who lives in a memory care assisted living facility.  Staff at the facility noticed a left breast mass in October 2024 and subsequent workup was performed.  Patient is noted to have a prior history of left breast cancer in the 1990s treated with lumpectomy, radiation and endocrine therapy likely tamoxifen.    10/8/2024-bilateral diagnostic mammogram and bilateral breast ultrasound  Heterogeneously dense breast  The palpable left breast mass was not included in the field-of-view due to the posterior location of the palpated mass and technical limitations due to the patient's body habitus, patient was noted to be small breasted and has additional views were unable to be performed.  Calcifications noted in the posterior left breast.  Ultrasound  Few coarse calcifications are noted in the right breast  Superficial linear hypoechogenicity with corresponding echogenic calcifications in the upper inner quadrant of the left breast.  This corresponds to the calcifications noted on the mammogram.  This may potentially reflect the site of prior surgery.  At 8:00, 6 cm from the nipple there is a 2 x 1.5 x 2 cm irregular hypoechoic mass with internal echogenic calcifications and direct involvement of the overlying skin.  Highly suggestive of malignancy.  In the left axilla there is a 0.7 x 0.8 x 3.9 cm irregular hypoechoic mass suggestive of abnormal lymph node.  This is suspicious.    Impression  1.Limited exam  2.2 centimeter mass with direct involvement of the overlying skin at 8:00 in the left breast is highly suspicious.  Ultrasound-guided biopsy recommended  3.suspicious 0.9 cm left axillary lymph node.  Further evaluation with ultrasound-guided biopsy recommended.  4.superficial hypoechogenicity with echogenic calcifications in the upper inner  quadrant of the left breast is incompletely assessed and potentially reflect a site of prior biopsy but could also be malignant.  Evaluation is limited due to limited mammogram and lack of prior imaging for comparison.  Recommend comparison with prior imaging.  Contrast-enhanced breast MRI could be helpful.  5.no evidence of malignancy in the right breast accounting for the above limitations.    10/8/2024-left breast and axillary biopsies  1.left breast, 8:00, 6 cm from the nipple core biopsy  A.invasive mammary carcinoma with micropapillary features.  Grade 2  Tumor measures 8 mm  ER +91 to 100% strong  HI +91 to 100% strong  HER2 2+ on immunohistochemistry, amplified on FISH with a ratio of 2.3, HER2 nu copy #6.5, CEP 17 copy number 2.8, ratio 2.3    2.left axilla soft tissue, core biopsy  Invasive mammary carcinoma, grade 2  ER +91 to 100% strong  HI +91 to 100% strong  HER2 2+ on immunohistochemistry, amplified on FISH with a HER2 copy number of 4.8, CEP 17 copy #2.0, ratio 2.4  Ki-67 19%    Comment  The tumor morphology in specimens 1 and 2 are not identical.    There is minimal lymphoid tissue associated with the tumor in specimen #2 and a definitive notable metastasis cannot be confirmed.    This could possibly represent a shameka metastasis with complete replacement of the lymph node with a different morphology (morphologic heterogeneity within a single primary breast tumor), however a metastasis from a second breast primary versus a second axillary primary cannot be excluded based on pathologic slides.  Clinical and radiographic correlation is recommended.    Comorbidities include diabetes,Hypothyroidism. Significant dementia.    Anastrozole started December 2024    Interval history  Ms. Bangura returns today for follow-up accompanied by her daughter-in-law and son.  She continues at the long-term care facility.  Dementia has been worsening and she is now having difficulty with transfers and they are having to  use transportation.  She is being given the anastrozole daily and does not seem to be having any adverse effects.  The left breast mass seems unchanged.      The following portions of the patient's history were reviewed and updated as appropriate: allergies, current medications, past family history, past medical history, past social history, past surgical history, and problem list.    Past Medical History:   Diagnosis Date    Abnormal result of Mantoux test 1970    TEST POSITIVE. 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     History of UTI     Hypercholesteremia     Hypotension     Osteopenia     PONV (postoperative nausea and vomiting)     Retinal vein occlusion     Left eye        Past Surgical History:   Procedure Laterality Date    BREAST LUMPECTOMY Left     CATARACT EXTRACTION      COLONOSCOPY  2016    HIP INTERTROCHANTERIC NAILING Left 12/18/2022    Procedure: HIP INTERTROCHANTERIC NAILING;  Surgeon: Williams Church MD;  Location: [facility];  Service: Orthopedics;  Laterality: Left;    KNEE ARTHROPLASTY Left 2015    TONSILLECTOMY      TUBAL ABDOMINAL LIGATION      US GUIDED LYMPH NODE BIOPSY  10/08/2024    WISDOM TOOTH EXTRACTION Bilateral         Family History   Problem Relation Age of Onset    Arthritis Mother     Dementia Mother     Migraines Mother     Testicular cancer Father         Unknown age of diagnosis    Heart disease Father     Breast cancer Sister         Unknown age of diagnosis    No Known Problems Son     No Known Problems Son         Social History     Socioeconomic History    Marital status:     Number of children: 2   Tobacco Use    Smoking status: Never    Smokeless tobacco: Never   Vaping Use    Vaping status: Never Used   Substance and Sexual Activity    Alcohol use: Never    Drug use:  Never    Sexual activity: Defer        OB History    No obstetric history on file.      Age at menarche and age at menopause unclear   2 para 2  1  Oral contraceptive pill use for a few years  Unsure if use of hormone replacement therapy    No Known Allergies         Review of Systems  Unable to obtain due to cognitive status.    Objective   There were no vitals taken for this visit.   Physical Exam  Vitals reviewed.   Constitutional:       Appearance: Normal appearance.   HENT:      Nose: Nose normal.      Mouth/Throat:      Pharynx: Oropharynx is clear.   Eyes:      Conjunctiva/sclera: Conjunctivae normal.   Cardiovascular:      Rate and Rhythm: Normal rate.   Pulmonary:      Effort: Pulmonary effort is normal.   Skin:     General: Skin is warm.   Neurological:      General: No focal deficit present.      Mental Status: She is alert.   Psychiatric:         Mood and Affect: Mood normal.       Breast Exam:Right breast-there is a scar at 2 o'clock position.  Possible underlying scar tissue.  Left breast-scar at 9 o'clock position.  Around 8 o'clock position at the medial aspect of the breast there is a 2 x 2 cm circular indurated erythematous nodule with seems to be fixed to the underlying chest wall.  No obvious axillary lymphadenopathy palpated.    I have reexamined the patient and the results are consistent with the previously documented exam. Paty Gonzalez MD      No visits with results within 30 Day(s) from this visit.   Latest known visit with results is:   Lab on 2024   Component Date Value Ref Range Status    WBC 2024 8.47  3.40 - 10.80 10*3/mm3 Final    RBC 2024 5.48 (H)  3.77 - 5.28 10*6/mm3 Final    Hemoglobin 2024 17.1 (H)  12.0 - 15.9 g/dL Final    Hematocrit 2024 50.1 (H)  34.0 - 46.6 % Final    MCV 2024 91.4  79.0 - 97.0 fL Final    MCH 2024 31.2  26.6 - 33.0 pg Final    MCHC 2024 34.1  31.5 - 35.7 g/dL Final    RDW 2024 12.5  12.3  - 15.4 % Final    RDW-SD 12/13/2024 42.2  37.0 - 54.0 fl Final    MPV 12/13/2024 11.7  6.0 - 12.0 fL Final    Platelets 12/13/2024 167  140 - 450 10*3/mm3 Final    Neutrophil % 12/13/2024 61.2  42.7 - 76.0 % Final    Lymphocyte % 12/13/2024 26.0  19.6 - 45.3 % Final    Monocyte % 12/13/2024 8.1  5.0 - 12.0 % Final    Eosinophil % 12/13/2024 3.3  0.3 - 6.2 % Final    Basophil % 12/13/2024 0.7  0.0 - 1.5 % Final    Immature Grans % 12/13/2024 0.7 (H)  0.0 - 0.5 % Final    Neutrophils, Absolute 12/13/2024 5.18  1.70 - 7.00 10*3/mm3 Final    Lymphocytes, Absolute 12/13/2024 2.20  0.70 - 3.10 10*3/mm3 Final    Monocytes, Absolute 12/13/2024 0.69  0.10 - 0.90 10*3/mm3 Final    Eosinophils, Absolute 12/13/2024 0.28  0.00 - 0.40 10*3/mm3 Final    Basophils, Absolute 12/13/2024 0.06  0.00 - 0.20 10*3/mm3 Final    Immature Grans, Absolute 12/13/2024 0.06 (H)  0.00 - 0.05 10*3/mm3 Final    nRBC 12/13/2024 0.0  0.0 - 0.2 /100 WBC Final        No radiology results for the last 30 days.       Assessment & Plan       *Left breast invasive ductal carcinoma  T4b N1 MX, ER/AL strongly positive, HER2 2+ on immunohistochemistry but amplified on FISH, Ki-67 19%, grade 2  Anatomic stage IIIb and prognostic stage IIIa  Patient has significant dementia and hence unable to tolerate standard of care treatments.  She has seen Dr. Lewis and the concern is that her dementia could get significantly worse if she is put through anesthesia.  She had a hip surgery a few years ago had a very difficult time after anesthesia.  It is unclear to us as to how long this tumor has been present.  Given the low Ki-67 and low grade of the tumor although it is HER2 positive it may be reasonable to try endocrine therapy alone.  I discussed with patient's son and daughter-in-law about the standard of care treatment which would entail chemotherapy along with HER2 directed therapy followed by surgery, radiation and endocrine therapy along with HER2 directed  therapy.  However this is assuming that the disease is localized.  As a palliative measure we could consider endocrine therapy along with HER2 directed therapy however after we discussed the adverse effects of HER2 directed therapy patient's family wanted to hold off on that and wanted to try endocrine therapy only.  They understand that the purpose of the treatment is to prevent further progression of disease and maybe possible shrinkage.  Anastrozole initiated December 2024  Tolerating that well  Left breast mass stable to improved    *Advanced dementia  Patient is currently a resident of [facility] in [place].  She is completely dependent on others for her care.  She does not recall seeing Dr. Lewis for does not recall being diagnosed with breast cancer.  Worsening dementia with now difficulty with transfer.    *Diabetes  Continue insulin  No changes    *Follow-up-3 months APRN in 6 months MD      Patient is on medications requiring close monitoring for toxicities.

## 2025-08-26 ENCOUNTER — TELEPHONE (OUTPATIENT)
Dept: ONCOLOGY | Facility: CLINIC | Age: 85
End: 2025-08-26
Payer: MEDICARE

## (undated) DEVICE — PK HIP PINNING 40

## (undated) DEVICE — GLV SURG BIOGEL LTX PF 8

## (undated) DEVICE — GUIDE WIRE, BALL-TIPPED, STERILE

## (undated) DEVICE — NEEDLE, QUINCKE, 18GX3.5": Brand: MEDLINE

## (undated) DEVICE — 3M™ IOBAN™ 2 ANTIMICROBIAL INCISE DRAPE 6650EZ: Brand: IOBAN™ 2

## (undated) DEVICE — DRSNG SURESITE WNDW 4X4.5

## (undated) DEVICE — GLV SURG SENSICARE PI LF PF 8 GRN STRL

## (undated) DEVICE — 1010 S-DRAPE TOWEL DRAPE 10/BX: Brand: STERI-DRAPE™

## (undated) DEVICE — SUT MNCRYL 2/0 SH 27IN Y417H

## (undated) DEVICE — ANES CIRC EXTENDAFLEX-LF: Brand: MEDLINE INDUSTRIES, INC.

## (undated) DEVICE — SUT ETHLN 3/0 PSL BLK MONO SA 30IN 1691H

## (undated) DEVICE — TRAP FLD MINIVAC MEGADYNE 100ML

## (undated) DEVICE — DRSNG GZ PETROLTM XEROFORM CURAD 1X8IN STRL

## (undated) DEVICE — K-WIRE
Type: IMPLANTABLE DEVICE | Site: TROCHANTER | Status: NON-FUNCTIONAL
Removed: 2022-12-18

## (undated) DEVICE — DRILL, AO, STERILE

## (undated) DEVICE — APPL DURAPREP IODOPHOR APL 26ML

## (undated) DEVICE — ADAPT CLIP: Brand: GAMMA

## (undated) DEVICE — NEEDLE, QUINCKE, 20GX3.5": Brand: MEDLINE

## (undated) DEVICE — DRP C/ARMOR

## (undated) DEVICE — SPNG GZ WOVN 4X4IN 12PLY 10/BX STRL

## (undated) DEVICE — MAT FLR ABSORBENT LG 4FT 10 2.5FT

## (undated) DEVICE — SUT VIC 2/0 CT2 27IN J269H

## (undated) DEVICE — SOL ISO/ALC RUB 70PCT 4OZ

## (undated) DEVICE — 3M™ IOBAN™ 2 ANTIMICROBIAL INCISE DRAPE 6648EZ: Brand: IOBAN™ 2

## (undated) DEVICE — STPLR SKIN VISISTAT WD 35CT

## (undated) DEVICE — DRAPE,REIN 53X77,STERILE: Brand: MEDLINE

## (undated) DEVICE — ANTIBACTERIAL UNDYED BRAIDED (POLYGLACTIN 910), SYNTHETIC ABSORBABLE SUTURE: Brand: COATED VICRYL